# Patient Record
Sex: MALE | Race: WHITE | Employment: OTHER | URBAN - METROPOLITAN AREA
[De-identification: names, ages, dates, MRNs, and addresses within clinical notes are randomized per-mention and may not be internally consistent; named-entity substitution may affect disease eponyms.]

---

## 2017-03-31 DIAGNOSIS — I25.10 ATHEROSCLEROSIS OF CORONARY ARTERY OF NATIVE HEART WITHOUT ANGINA PECTORIS, UNSPECIFIED VESSEL OR LESION TYPE: ICD-10-CM

## 2017-03-31 DIAGNOSIS — E78.2 MIXED HYPERLIPIDEMIA: ICD-10-CM

## 2017-03-31 RX ORDER — PRAVASTATIN SODIUM 40 MG/1
40 TABLET ORAL
Qty: 30 TAB | Refills: 2 | Status: SHIPPED | OUTPATIENT
Start: 2017-03-31 | End: 2017-05-26 | Stop reason: SDUPTHER

## 2017-05-26 ENCOUNTER — OFFICE VISIT (OUTPATIENT)
Dept: CARDIOLOGY CLINIC | Age: 72
End: 2017-05-26

## 2017-05-26 VITALS
SYSTOLIC BLOOD PRESSURE: 102 MMHG | WEIGHT: 187 LBS | HEART RATE: 70 BPM | OXYGEN SATURATION: 98 % | BODY MASS INDEX: 25.33 KG/M2 | RESPIRATION RATE: 16 BRPM | DIASTOLIC BLOOD PRESSURE: 70 MMHG | HEIGHT: 72 IN

## 2017-05-26 DIAGNOSIS — Z00.00 ROUTINE CHECK-UP: ICD-10-CM

## 2017-05-26 DIAGNOSIS — G47.9 FATIGUE DUE TO SLEEP PATTERN DISTURBANCE: ICD-10-CM

## 2017-05-26 DIAGNOSIS — R53.83 FATIGUE DUE TO SLEEP PATTERN DISTURBANCE: ICD-10-CM

## 2017-05-26 DIAGNOSIS — I25.10 ATHEROSCLEROSIS OF CORONARY ARTERY OF NATIVE HEART WITHOUT ANGINA PECTORIS, UNSPECIFIED VESSEL OR LESION TYPE: Primary | ICD-10-CM

## 2017-05-26 DIAGNOSIS — E78.2 MIXED HYPERLIPIDEMIA: ICD-10-CM

## 2017-05-26 RX ORDER — PRAVASTATIN SODIUM 40 MG/1
40 TABLET ORAL
Qty: 90 TAB | Refills: 2 | Status: SHIPPED | OUTPATIENT
Start: 2017-05-26 | End: 2018-02-17 | Stop reason: SDUPTHER

## 2017-05-26 NOTE — MR AVS SNAPSHOT
Visit Information Date & Time Provider Department Dept. Phone Encounter #  
 5/26/2017  8:40 AM Pamela Melvin MD [facility] [phone] [id number] Follow-up Instructions Return in about 1 year (around 5/26/2018). Your Appointments 5/25/2018  8:40 AM  
ESTABLISHED PATIENT with Pamela Melvin MD  
[facility] ([address]) Appt Note: one year follow up  
 [address] [place] [address]  
[address] [facility] [address] Upcoming Health Maintenance Date Due DTaP/Tdap/Td series (1 - Tdap) 8/16/1965 FOBT Q 1 YEAR AGE 50-75 8/16/1994 ZOSTER VACCINE AGE 60> 8/16/2004 GLAUCOMA SCREENING Q2Y 8/16/2009 Pneumococcal 65+ Low/Medium Risk (1 of 2 - PCV13) 8/16/2009 MEDICARE YEARLY EXAM 8/16/2009 INFLUENZA AGE 9 TO ADULT 8/1/2017 Allergies as of 5/26/2017  Review Complete On: 5/26/2017 By: Pamela Melvin MD  
  
 Severity Noted Reaction Type Reactions Ciprofloxacin  11/19/2010   Side Effect Rash, Other (comments) fever Dilaudid [Hydromorphone]  11/19/2010   Intolerance Unknown (comments) BP problems Sulfa (Sulfonamide Antibiotics)  11/19/2010   Side Effect Rash, Other (comments) Fever Current Immunizations  Never Reviewed No immunizations on file. Not reviewed this visit You Were Diagnosed With   
  
 Codes Comments Atherosclerosis of coronary artery of native heart without angina pectoris, unspecified vessel or lesion type    -  Primary ICD-10-CM: I25.10 ICD-9-CM: 414.01 Mixed hyperlipidemia     ICD-10-CM: E78.2 ICD-9-CM: 272.2 Routine check-up     ICD-10-CM: Z00.00 ICD-9-CM: V70.0 Vitals BP Pulse Resp Height(growth percentile) Weight(growth percentile) SpO2  
 102/70 (BP 1 Location: Left arm, BP Patient Position: Sitting) 70 16 6' (1.829 m) 187 lb (84.8 kg) 98% BMI Smoking Status 25.36 kg/m2 Former Smoker Vitals History BMI and BSA Data Body Mass Index Body Surface Area  
 25.36 kg/m 2 2.08 m 2 Preferred Pharmacy Pharmacy Name Phone [facility]/PHARMACY #[id number] [facility] [phone] Your Updated Medication List  
  
   
This list is accurate as of: 5/26/17  9:20 AM.  Always use your most recent med list.  
  
  
  
  
 aspirin 81 mg tablet Take 81 mg by mouth daily. FLOMAX 0.4 mg capsule Generic drug:  tamsulosin Take 0.4 mg by mouth daily. metoprolol succinate 25 mg XL tablet Commonly known as:  TOPROL-XL Take 0.5 Tabs by mouth daily. pravastatin 40 mg tablet Commonly known as:  PRAVACHOL Take 1 Tab by mouth nightly. VOLTAREN 75 mg EC tablet Generic drug:  diclofenac EC Take 75 mg by mouth daily. Prescriptions Sent to Pharmacy Refills  
 pravastatin (PRAVACHOL) 40 mg tablet 2 Sig: Take 1 Tab by mouth nightly. Class: Normal  
 Pharmacy: [facility]/pharmacy #[address] [place] [address] #: [phone] Route: Oral  
  
We Performed the Following AMB POC EKG ROUTINE W/ 12 LEADS, INTER & REP [08393 CPT(R)] LIPID PANEL [64516 CPT(R)] METABOLIC PANEL, COMPREHENSIVE [77327 CPT(R)] TSH REFLEX TO T4 [[id number] Custom] Follow-up Instructions Return in about 1 year (around 5/26/2018). Introducing [facility] & HEALTH SERVICES! [facility] introduces [employer] patient portal. Now you can access parts of your medical record, email your doctor's office, and request medication refills online. 1. In your internet browser, go to [email or web address] 2. Click on the First Time User? Click Here link in the Sign In box. You will see the New Member Sign Up page. 3. Enter your [employer] Access Code exactly as it appears below.  You will not need to use this code after youve completed the sign-up process. If you do not sign up before the expiration date, you must request a new code. · [employer] Access Code: [email or web address] Expires: 8/24/2017  8:59 AM 
 
4. Enter the last four digits of your Social Security Number (xxxx) and Date of Birth (mm/dd/yyyy) as indicated and click Submit. You will be taken to the next sign-up page. 5. Create a [employer] ID. This will be your [employer] login ID and cannot be changed, so think of one that is secure and easy to remember. 6. Create a [employer] password. You can change your password at any time. 7. Enter your Password Reset Question and Answer. This can be used at a later time if you forget your password. 8. Enter your e-mail address. You will receive e-mail notification when new information is available in [address] 9. Click Sign Up. You can now view and download portions of your medical record. 10. Click the Download Summary menu link to download a portable copy of your medical information. If you have questions, please visit the Frequently Asked Questions section of the [employer] website. Remember, [employer] is NOT to be used for urgent needs. For medical emergencies, dial 911. Now available from your iPhone and Android! Please provide this summary of care documentation to your next provider. Your primary care clinician is listed as Bhupinder Leal. If you have any questions after today's visit, please call [phone].

## 2017-05-26 NOTE — PROGRESS NOTES
HISTORY OF PRESENT ILLNESS  Reshma Butterfield is a 66 y.o. male     SUMMARY:   Problem List  Date Reviewed: 5/26/2017          Codes Class Noted    Mixed hyperlipidemia ICD-10-CM: E78.2  ICD-9-CM: 272.2  Unknown        Coronary atherosclerosis ICD-10-CM: I25.10  ICD-9-CM: 414.00  11/19/2010    Overview Addendum 2/17/2011  3:26 PM by Johanny Leonard MD     Mr. Sanju Guido presented in January 2007 with progressive angina. He underwent coronary arteriography, which showed significant multivessel coronary disease with overall preserved LV function. He then underwent bypass surgery with an internal mammary to the LAD, a vein graft to the circumflex marginal and a vein graft to the posterior descending. Cath 2/11 showed sig 3v disease, patent svg to lcx and lima to lad, occ svg to rca with left to right collaterals. Probable culprit diffusely diseased diagonal, less than 2mm. lvef 50%                   Current Outpatient Prescriptions on File Prior to Visit   Medication Sig    tamsulosin (FLOMAX) 0.4 mg capsule Take 0.4 mg by mouth daily.  aspirin 81 mg tablet Take 81 mg by mouth daily.  diclofenac EC (VOLTAREN) 75 mg EC tablet Take 75 mg by mouth daily.  metoprolol succinate (TOPROL-XL) 25 mg XL tablet Take 0.5 Tabs by mouth daily. No current facility-administered medications on file prior to visit. CARDIOLOGY STUDIES TO DATE:  2/9/11. Adenosine cardiolyte. Cardiac stress testing was pos for chest pain and myocardial ischemia. Myocardial perfusion imaging showed reversible deficits. of the ant lat wall Gated EF (%): 52      Chief Complaint   Patient presents with    Coronary Artery Disease     HPI :  Mr. Sanju Guido is doing well. He is complaining of some generalized fatigue especially late in the day, but it turns out he does not sleep real well, and he gets up at five o'clock in the morning and plays tennis and still works full-time. We talked about the possibility of sleep apnea.  He has not had any screening labs for quite a while. He is tolerating the pravastatin but has not had follow-up lipids since he started it. CARDIAC ROS:   negative for chest pain, dyspnea, palpitations, syncope, orthopnea, paroxysmal nocturnal dyspnea, exertional chest pressure/discomfort, claudication, lower extremity edema    No family history on file. Past Medical History:   Diagnosis Date    Coronary Atherosclerosis Coronary Unspecified 11/19/2010    Mixed hyperlipidemia        GENERAL ROS:  A comprehensive review of systems was negative except for that written in the HPI.     Visit Vitals    /70 (BP 1 Location: Left arm, BP Patient Position: Sitting)    Pulse 70    Resp 16    Ht 6' (1.829 m)    Wt 187 lb (84.8 kg)    SpO2 98%    BMI 25.36 kg/m2       Wt Readings from Last 3 Encounters:   05/26/17 187 lb (84.8 kg)   05/27/16 168 lb (76.2 kg)   04/27/15 195 lb 12.8 oz (88.8 kg)            BP Readings from Last 3 Encounters:   05/26/17 102/70   05/27/16 130/76   04/27/15 112/64       PHYSICAL EXAM  General appearance: alert, cooperative, no distress, appears stated age  Neck: supple, symmetrical, trachea midline, no adenopathy, no carotid bruit and no JVD  Lungs: clear to auscultation bilaterally  Heart: regular rate and rhythm, S1, S2 normal, no murmur, click, rub or gallop  Extremities: extremities normal, atraumatic, no cyanosis or edema    Lab Results   Component Value Date/Time    Cholesterol, total 194 07/29/2016 07:33 AM    Cholesterol, total 209 06/03/2016 08:01 AM    Cholesterol, total 170 05/02/2015 07:42 AM    Cholesterol, total 150 04/07/2014 07:50 AM    Cholesterol, total 172 06/27/2013 07:50 AM    HDL Cholesterol 84 07/29/2016 07:33 AM    HDL Cholesterol 70 06/03/2016 08:01 AM    HDL Cholesterol 69 05/02/2015 07:42 AM    HDL Cholesterol 65 04/07/2014 07:50 AM    HDL Cholesterol 73 06/27/2013 07:50 AM    LDL, calculated 97 07/29/2016 07:33 AM    LDL, calculated 126 06/03/2016 08:01 AM    LDL, calculated 87 05/02/2015 07:42 AM    LDL, calculated 73 04/07/2014 07:50 AM    LDL, calculated 87 06/27/2013 07:50 AM    Triglyceride 67 07/29/2016 07:33 AM    Triglyceride 63 06/03/2016 08:01 AM    Triglyceride 71 05/02/2015 07:42 AM    Triglyceride 59 04/07/2014 07:50 AM    Triglyceride 58 06/27/2013 07:50 AM     ASSESSMENT  Mr. Keshawn Perea is stable and asymptomatic I think from a cardiac standpoint. We are going to send him for some screening labs including a CBC and thyroid functions and also check his lipids. He will let us know if he wants to proceed with a sleep evaluation. current treatment plan is effective, no change in therapy  lab results and schedule of future lab studies reviewed with patient  reviewed diet, exercise and weight control    Encounter Diagnoses   Name Primary?  Atherosclerosis of coronary artery of native heart without angina pectoris, unspecified vessel or lesion type Yes    Mixed hyperlipidemia     Routine check-up     Fatigue due to sleep pattern disturbance      Orders Placed This Encounter    LIPID PANEL    METABOLIC PANEL, COMPREHENSIVE    TSH REFLEX TO T4    AMB POC EKG ROUTINE W/ 12 LEADS, INTER & REP    pravastatin (PRAVACHOL) 40 mg tablet       Follow-up Disposition:  Return in about 1 year (around 5/26/2018).     Ben Sanchez MD  5/26/2017

## 2017-06-19 ENCOUNTER — HOSPITAL ENCOUNTER (OUTPATIENT)
Dept: LAB | Age: 72
Discharge: HOME | End: 2017-06-19
Payer: MEDICARE

## 2017-06-19 PROCEDURE — 36415 COLL VENOUS BLD VENIPUNCTURE: CPT

## 2017-06-19 PROCEDURE — 80053 COMPREHEN METABOLIC PANEL: CPT

## 2017-06-19 PROCEDURE — 80061 LIPID PANEL: CPT

## 2017-06-19 PROCEDURE — 84443 ASSAY THYROID STIM HORMONE: CPT

## 2017-06-20 LAB
ALBUMIN SERPL-MCNC: 4.1 G/DL (ref 3.5–4.8)
ALBUMIN/GLOB SERPL: 2 {RATIO} (ref 1.2–2.2)
ALP SERPL-CCNC: 86 IU/L (ref 39–117)
ALT SERPL-CCNC: 14 IU/L (ref 0–44)
AST SERPL-CCNC: 12 IU/L (ref 0–40)
BILIRUB SERPL-MCNC: 0.5 MG/DL (ref 0–1.2)
BUN SERPL-MCNC: 20 MG/DL (ref 8–27)
BUN/CREAT SERPL: 19 (ref 10–24)
CALCIUM SERPL-MCNC: 8.8 MG/DL (ref 8.6–10.2)
CHLORIDE SERPL-SCNC: 103 MMOL/L (ref 96–106)
CHOLEST SERPL-MCNC: 160 MG/DL (ref 100–199)
CO2 SERPL-SCNC: 23 MMOL/L (ref 18–29)
CREAT SERPL-MCNC: 1.06 MG/DL (ref 0.76–1.27)
GLOBULIN SER CALC-MCNC: 2.1 G/DL (ref 1.5–4.5)
GLUCOSE SERPL-MCNC: 106 MG/DL (ref 65–99)
HDLC SERPL-MCNC: 78 MG/DL
INTERPRETATION, 910389: NORMAL
LDLC SERPL CALC-MCNC: 72 MG/DL (ref 0–99)
POTASSIUM SERPL-SCNC: 4.4 MMOL/L (ref 3.5–5.2)
PROT SERPL-MCNC: 6.2 G/DL (ref 6–8.5)
SODIUM SERPL-SCNC: 141 MMOL/L (ref 134–144)
TRIGL SERPL-MCNC: 48 MG/DL (ref 0–149)
TSH SERPL DL<=0.005 MIU/L-ACNC: 2.19 UIU/ML (ref 0.45–4.5)
VLDLC SERPL CALC-MCNC: 10 MG/DL (ref 5–40)

## 2018-05-07 ENCOUNTER — TELEPHONE (OUTPATIENT)
Dept: CARDIOLOGY CLINIC | Age: 73
End: 2018-05-07

## 2018-05-07 NOTE — TELEPHONE ENCOUNTER
Dr. Samm Muir [facility]) is requesting cardiac clearance for patient to have a prostate biopsy with ultrasound under local anesthesia. The procedure is scheduled for 5/21/2018. Cardiac clearance is to include holding aspirin. If okay, how many days may he hold it? Please advise. Thanks.      Fax # [phone] Chase Huynh  Phone # [phone]       Future Appointments  Date Time Provider Jen Perez   5/25/2018 8:40 AM Shan Riley  [address]

## 2018-05-07 NOTE — TELEPHONE ENCOUNTER
[id number] for surgery without special precautions or further cardiac testing.  Hold asa for 7 days

## 2018-06-13 ENCOUNTER — OFFICE VISIT (OUTPATIENT)
Dept: CARDIOLOGY CLINIC | Age: 73
End: 2018-06-13

## 2018-06-13 VITALS
RESPIRATION RATE: 16 BRPM | DIASTOLIC BLOOD PRESSURE: 70 MMHG | WEIGHT: 178 LBS | HEART RATE: 75 BPM | SYSTOLIC BLOOD PRESSURE: 116 MMHG | HEIGHT: 72 IN | BODY MASS INDEX: 24.11 KG/M2 | OXYGEN SATURATION: 98 %

## 2018-06-13 DIAGNOSIS — I25.10 ATHEROSCLEROSIS OF NATIVE CORONARY ARTERY OF NATIVE HEART WITHOUT ANGINA PECTORIS: ICD-10-CM

## 2018-06-13 DIAGNOSIS — R29.818 SUSPECTED SLEEP APNEA: ICD-10-CM

## 2018-06-13 DIAGNOSIS — E78.2 MIXED HYPERLIPIDEMIA: Primary | ICD-10-CM

## 2018-06-13 DIAGNOSIS — R53.82 CHRONIC FATIGUE: ICD-10-CM

## 2018-06-13 NOTE — PROGRESS NOTES
HISTORY OF PRESENT ILLNESS  Jason Sim is a 67 y.o. male     SUMMARY:   Problem List  Date Reviewed: 6/13/2018          Codes Class Noted    Mixed hyperlipidemia ICD-10-CM: E78.2  ICD-9-CM: 272.2  Unknown        Coronary atherosclerosis ICD-10-CM: I25.10  ICD-9-CM: 414.00  11/19/2010    Overview Addendum 2/17/2011  3:26 PM by Philomena Vallejo MD     Mr. Marzena Pepe presented in January 2007 with progressive angina. He underwent coronary arteriography, which showed significant multivessel coronary disease with overall preserved LV function. He then underwent bypass surgery with an internal mammary to the LAD, a vein graft to the circumflex marginal and a vein graft to the posterior descending. Cath 2/11 showed sig 3v disease, patent svg to lcx and lima to lad, occ svg to rca with left to right collaterals. Probable culprit diffusely diseased diagonal, less than 2mm. lvef 50%                   Current Outpatient Prescriptions on File Prior to Visit   Medication Sig    pravastatin (PRAVACHOL) 40 mg tablet TAKE 1 TABLET BY MOUTH NIGHTLY    tamsulosin (FLOMAX) 0.4 mg capsule Take 0.4 mg by mouth daily.  aspirin 81 mg tablet Take 81 mg by mouth daily.  diclofenac EC (VOLTAREN) 75 mg EC tablet Take 75 mg by mouth daily. No current facility-administered medications on file prior to visit. CARDIOLOGY STUDIES TO DATE:  2/9/11. Adenosine cardiolyte. Cardiac stress testing was pos for chest pain and myocardial ischemia. Myocardial perfusion imaging showed reversible deficits. of the ant lat wall Gated EF (%): 52      Chief Complaint   Patient presents with    Coronary Artery Disease     HPI :  Mr. Marzena Pepe is still complaining of fatigue and it turns out in talking to his wife, he does snore. He wakes up tired and will nap during the day, so I am wondering about the possibility of sleep apnea. He has not had his lipids checked for about a year.   He continues to be very active around the house and exercise without any symptoms suggestive of angina or heart failure. CARDIAC ROS:   negative for chest pain, dyspnea, palpitations, syncope, orthopnea, paroxysmal nocturnal dyspnea, exertional chest pressure/discomfort, claudication, lower extremity edema    No family history on file. Past Medical History:   Diagnosis Date    Coronary Atherosclerosis Coronary Unspecified 11/19/2010    Mixed hyperlipidemia        GENERAL ROS:  A comprehensive review of systems was negative except for that written in the HPI.     Visit Vitals    /70 (BP 1 Location: Left arm, BP Patient Position: Sitting)    Pulse 75    Resp 16    Ht 6' (1.829 m)    Wt 178 lb (80.7 kg)    SpO2 98%    BMI 24.14 kg/m2       Wt Readings from Last 3 Encounters:   06/13/18 178 lb (80.7 kg)   05/26/17 187 lb (84.8 kg)   05/27/16 168 lb (76.2 kg)            BP Readings from Last 3 Encounters:   06/13/18 116/70   05/26/17 102/70   05/27/16 130/76       PHYSICAL EXAM  General appearance: alert, cooperative, no distress, appears stated age  Neck: supple, symmetrical, trachea midline, no adenopathy, no carotid bruit and no JVD  Lungs: clear to auscultation bilaterally  Heart: regular rate and rhythm, S1, S2 normal, no murmur, click, rub or gallop  Extremities: extremities normal, atraumatic, no cyanosis or edema    Lab Results   Component Value Date/Time    Cholesterol, total 160 06/19/2017 07:29 AM    Cholesterol, total 194 07/29/2016 07:33 AM    Cholesterol, total 209 (H) 06/03/2016 08:01 AM    Cholesterol, total 170 05/02/2015 07:42 AM    Cholesterol, total 150 04/07/2014 07:50 AM    HDL Cholesterol 78 06/19/2017 07:29 AM    HDL Cholesterol 84 07/29/2016 07:33 AM    HDL Cholesterol 70 06/03/2016 08:01 AM    HDL Cholesterol 69 05/02/2015 07:42 AM    HDL Cholesterol 65 04/07/2014 07:50 AM    LDL, calculated 72 06/19/2017 07:29 AM    LDL, calculated 97 07/29/2016 07:33 AM    LDL, calculated 126 (H) 06/03/2016 08:01 AM    LDL, calculated 87 05/02/2015 07:42 AM    LDL, calculated 73 04/07/2014 07:50 AM    Triglyceride 48 06/19/2017 07:29 AM    Triglyceride 67 07/29/2016 07:33 AM    Triglyceride 63 06/03/2016 08:01 AM    Triglyceride 71 05/02/2015 07:42 AM    Triglyceride 59 04/07/2014 07:50 AM     ASSESSMENT  Mr. Bowen Ramírez is stable and I think asymptomatic. We are going to send him for a fasting lipid profile and a sleep evaluation. current treatment plan is effective, no change in therapy  lab results and schedule of future lab studies reviewed with patient  reviewed diet, exercise and weight control    Encounter Diagnoses   Name Primary?  Mixed hyperlipidemia Yes    Atherosclerosis of native coronary artery of native heart without angina pectoris      No orders of the defined types were placed in this encounter. Follow-up Disposition:  Return in about 1 year (around 6/13/2019).     Alfredo Saldana MD  6/13/2018

## 2018-06-13 NOTE — MR AVS SNAPSHOT
[address] [facility] [address] [facility] 57 
[phone] Patient: Austin Coker MRN: [id number] EJ:[id number] Visit Information Date & Time Provider Department Dept. Phone Encounter #  
 6/13/2018  3:20 PM Claus Peraza MD [facility] Kevin Fuentes [phone] [id number] Follow-up Instructions Return in about 1 year (around 6/13/2019). Upcoming Health Maintenance Date Due DTaP/Tdap/Td series (1 - Tdap) 8/16/1965 FOBT Q 1 YEAR AGE 50-75 8/16/1994 ZOSTER VACCINE AGE 60> 6/16/2004 GLAUCOMA SCREENING Q2Y 8/16/2009 Pneumococcal 65+ Low/Medium Risk (1 of 2 - PCV13) 8/16/2009 MEDICARE YEARLY EXAM 3/14/2018 Influenza Age 5 to Adult 8/1/2018 Allergies as of 6/13/2018  Review Complete On: 6/13/2018 By: Claus Peraza MD  
  
 Severity Noted Reaction Type Reactions Ciprofloxacin  11/19/2010   Side Effect Rash, Other (comments) fever Dilaudid [Hydromorphone]  11/19/2010   Intolerance Unknown (comments) BP problems Sulfa (Sulfonamide Antibiotics)  11/19/2010   Side Effect Rash, Other (comments) Fever Current Immunizations  Never Reviewed No immunizations on file. Not reviewed this visit You Were Diagnosed With   
  
 Codes Comments Mixed hyperlipidemia    -  Primary ICD-10-CM: U06.1 ICD-9-CM: 272.2 Atherosclerosis of native coronary artery of native heart without angina pectoris     ICD-10-CM: I25.10 ICD-9-CM: 414.01 Vitals BP Pulse Resp Height(growth percentile) Weight(growth percentile) SpO2  
 116/70 (BP 1 Location: Left arm, BP Patient Position: Sitting) 75 16 6' (1.829 m) 178 lb (80.7 kg) 98% BMI Smoking Status 24.14 kg/m2 Former Smoker BMI and BSA Data Body Mass Index Body Surface Area  
 24.14 kg/m 2 2.02 m 2 Preferred Pharmacy Pharmacy Name Phone [facility]/PHARMACY #[id number] [phone] [facility] [phone] Your Updated Medication List  
  
   
This list is accurate as of 6/13/18  3:26 PM.  Always use your most recent med list.  
  
  
  
  
 aspirin 81 mg tablet Take 81 mg by mouth daily. FLOMAX 0.4 mg capsule Generic drug:  tamsulosin Take 0.4 mg by mouth daily. pravastatin 40 mg tablet Commonly known as:  PRAVACHOL  
TAKE 1 TABLET BY MOUTH NIGHTLY  
  
 VOLTAREN 75 mg EC tablet Generic drug:  diclofenac EC Take 75 mg by mouth daily. Follow-up Instructions Return in about 1 year (around 6/13/2019). Introducing hospitals & HEALTH SERVICES! [facility] introduces [employer] patient portal. Now you can access parts of your medical record, email your doctor's office, and request medication refills online. 1. In your internet browser, go to [email or web address] 2. Click on the First Time User? Click Here link in the Sign In box. You will see the New Member Sign Up page. 3. Enter your [employer] Access Code exactly as it appears below. You will not need to use this code after youve completed the sign-up process. If you do not sign up before the expiration date, you must request a new code. · [employer] Access Code: [email or web address] Expires: 9/11/2018  3:26 PM 
 
4. Enter the last four digits of your Social Security Number (xxxx) and Date of Birth (mm/dd/yyyy) as indicated and click Submit. You will be taken to the next sign-up page. 5. Create a AAMPPt ID. This will be your [employer] login ID and cannot be changed, so think of one that is secure and easy to remember. 6. Create a [employer] password. You can change your password at any time. 7. Enter your Password Reset Question and Answer. This can be used at a later time if you forget your password. 8. Enter your e-mail address. You will receive e-mail notification when new information is available in [address] 9. Click Sign Up. You can now view and download portions of your medical record. 10. Click the Download Summary menu link to download a portable copy of your medical information. If you have questions, please visit the Frequently Asked Questions section of the [employer] website. Remember, [employer] is NOT to be used for urgent needs. For medical emergencies, dial 911. Now available from your iPhone and Android! Please provide this summary of care documentation to your next provider. Your primary care clinician is listed as Anna Van. If you have any questions after today's visit, please call [phone].

## 2018-06-14 ENCOUNTER — HOSPITAL ENCOUNTER (OUTPATIENT)
Dept: LAB | Age: 73
Discharge: HOME | End: 2018-06-14
Payer: MEDICARE

## 2018-06-14 PROCEDURE — 36415 COLL VENOUS BLD VENIPUNCTURE: CPT

## 2018-06-14 PROCEDURE — 80053 COMPREHEN METABOLIC PANEL: CPT

## 2018-06-14 PROCEDURE — 80061 LIPID PANEL: CPT

## 2018-06-15 ENCOUNTER — TELEPHONE (OUTPATIENT)
Dept: CARDIOLOGY CLINIC | Age: 73
End: 2018-06-15

## 2018-06-15 DIAGNOSIS — E78.2 MIXED HYPERLIPIDEMIA: Primary | ICD-10-CM

## 2018-06-15 DIAGNOSIS — I25.10 ATHEROSCLEROSIS OF NATIVE CORONARY ARTERY OF NATIVE HEART WITHOUT ANGINA PECTORIS: ICD-10-CM

## 2018-06-15 LAB
ALBUMIN SERPL-MCNC: 3.9 G/DL (ref 3.5–4.8)
ALBUMIN/GLOB SERPL: 1.7 {RATIO} (ref 1.2–2.2)
ALP SERPL-CCNC: 79 IU/L (ref 39–117)
ALT SERPL-CCNC: 21 IU/L (ref 0–44)
AST SERPL-CCNC: 17 IU/L (ref 0–40)
BILIRUB SERPL-MCNC: 0.8 MG/DL (ref 0–1.2)
BUN SERPL-MCNC: 18 MG/DL (ref 8–27)
BUN/CREAT SERPL: 17 (ref 10–24)
CALCIUM SERPL-MCNC: 9.1 MG/DL (ref 8.6–10.2)
CHLORIDE SERPL-SCNC: 104 MMOL/L (ref 96–106)
CHOLEST SERPL-MCNC: 203 MG/DL (ref 100–199)
CO2 SERPL-SCNC: 25 MMOL/L (ref 20–29)
CREAT SERPL-MCNC: 1.05 MG/DL (ref 0.76–1.27)
GFR SERPLBLD CREATININE-BSD FMLA CKD-EPI: 70 ML/MIN/1.73
GFR SERPLBLD CREATININE-BSD FMLA CKD-EPI: 81 ML/MIN/1.73
GLOBULIN SER CALC-MCNC: 2.3 G/DL (ref 1.5–4.5)
GLUCOSE SERPL-MCNC: 93 MG/DL (ref 65–99)
HDLC SERPL-MCNC: 67 MG/DL
INTERPRETATION, 910389: NORMAL
LDLC SERPL CALC-MCNC: 122 MG/DL (ref 0–99)
POTASSIUM SERPL-SCNC: 4.5 MMOL/L (ref 3.5–5.2)
PROT SERPL-MCNC: 6.2 G/DL (ref 6–8.5)
SODIUM SERPL-SCNC: 142 MMOL/L (ref 134–144)
TRIGL SERPL-MCNC: 69 MG/DL (ref 0–149)
VLDLC SERPL CALC-MCNC: 14 MG/DL (ref 5–40)

## 2018-06-15 NOTE — TELEPHONE ENCOUNTER
----- Message from Too Raygoza MD sent at 6/15/2018  8:16 AM EDT -----  Bad chol too high, double pravastatin and repeat labs 6wks

## 2018-06-15 NOTE — TELEPHONE ENCOUNTER
Spoke with patient Two patient indentifiers verified. Informed patient of his lab results and Dr. Ashley Villarreal recommendations. Mailed lab slip to patient for repeat labs in 6 weeks. Pt verbalized understanding and denies any questions at this time.

## 2018-06-15 NOTE — TELEPHONE ENCOUNTER
----- Message from Darren Talavera MD sent at 6/15/2018  8:16 AM EDT -----  Bad chol too high, double pravastatin and repeat labs 6wks

## 2018-07-19 ENCOUNTER — HOSPITAL ENCOUNTER (OUTPATIENT)
Dept: INTERVENTIONAL RADIOLOGY/VASCULAR | Age: 73
Discharge: HOME | End: 2018-07-19
Attending: ORTHOPAEDIC SURGERY | Admitting: STUDENT IN AN ORGANIZED HEALTH CARE EDUCATION/TRAINING PROGRAM
Payer: MEDICARE

## 2018-07-19 VITALS
BODY MASS INDEX: 25.05 KG/M2 | RESPIRATION RATE: 18 BRPM | HEIGHT: 70 IN | HEART RATE: 64 BPM | DIASTOLIC BLOOD PRESSURE: 70 MMHG | SYSTOLIC BLOOD PRESSURE: 129 MMHG | TEMPERATURE: 97.8 F | WEIGHT: 175 LBS | OXYGEN SATURATION: 97 %

## 2018-07-19 DIAGNOSIS — M51.36 DEGENERATION OF LUMBAR INTERVERTEBRAL DISC: ICD-10-CM

## 2018-07-19 PROCEDURE — 64483 NJX AA&/STRD TFRM EPI L/S 1: CPT

## 2018-07-19 PROCEDURE — 74011250636 HC RX REV CODE- 250/636: Performed by: STUDENT IN AN ORGANIZED HEALTH CARE EDUCATION/TRAINING PROGRAM

## 2018-07-19 PROCEDURE — 74011636320 HC RX REV CODE- 636/320: Performed by: STUDENT IN AN ORGANIZED HEALTH CARE EDUCATION/TRAINING PROGRAM

## 2018-07-19 PROCEDURE — 77030003666 HC NDL SPINAL BD -A

## 2018-07-19 PROCEDURE — 74011000250 HC RX REV CODE- 250: Performed by: STUDENT IN AN ORGANIZED HEALTH CARE EDUCATION/TRAINING PROGRAM

## 2018-07-19 PROCEDURE — 64484 NJX AA&/STRD TFRM EPI L/S EA: CPT

## 2018-07-19 RX ORDER — SODIUM CHLORIDE 9 MG/ML
10 INJECTION INTRAMUSCULAR; INTRAVENOUS; SUBCUTANEOUS
Status: DISCONTINUED | OUTPATIENT
Start: 2018-07-19 | End: 2018-07-19 | Stop reason: HOSPADM

## 2018-07-19 RX ORDER — DEXAMETHASONE SODIUM PHOSPHATE 4 MG/ML
8 INJECTION, SOLUTION INTRA-ARTICULAR; INTRALESIONAL; INTRAMUSCULAR; INTRAVENOUS; SOFT TISSUE ONCE
Status: COMPLETED | OUTPATIENT
Start: 2018-07-19 | End: 2018-07-19

## 2018-07-19 RX ORDER — LIDOCAINE HYDROCHLORIDE 10 MG/ML
INJECTION, SOLUTION EPIDURAL; INFILTRATION; INTRACAUDAL; PERINEURAL
Status: DISCONTINUED
Start: 2018-07-19 | End: 2018-07-19 | Stop reason: HOSPADM

## 2018-07-19 RX ORDER — SODIUM BICARBONATE 42 MG/ML
5 INJECTION, SOLUTION INTRAVENOUS
Status: DISCONTINUED | OUTPATIENT
Start: 2018-07-19 | End: 2018-07-19 | Stop reason: HOSPADM

## 2018-07-19 RX ORDER — LIDOCAINE HYDROCHLORIDE 10 MG/ML
10 INJECTION, SOLUTION EPIDURAL; INFILTRATION; INTRACAUDAL; PERINEURAL
Status: COMPLETED | OUTPATIENT
Start: 2018-07-19 | End: 2018-07-19

## 2018-07-19 RX ADMIN — LIDOCAINE HYDROCHLORIDE 10 ML: 10 INJECTION, SOLUTION EPIDURAL; INFILTRATION; INTRACAUDAL; PERINEURAL at 08:34

## 2018-07-19 RX ADMIN — IOHEXOL 20 ML: 180 INJECTION INTRAVENOUS at 08:34

## 2018-07-19 RX ADMIN — DEXAMETHASONE SODIUM PHOSPHATE 8 MG: 4 INJECTION, SOLUTION INTRAMUSCULAR; INTRAVENOUS at 08:35

## 2018-07-19 NOTE — ROUTINE PROCESS
Pt. Discharged to home and transported to d/c lot via w/c. Assisted for an extended time period for right leg numbness post procedure numbness. Able to stand and step without difficulty. Verbalized understanding of d/c instructions.

## 2018-07-19 NOTE — DISCHARGE INSTRUCTIONS
Steroid Injection Discharge Instructions    General Information:   A steroid injection was performed today, placing a combination of a steroid and an anesthetic (numbing medicine) into the space around the nerves of your spine. This is done to treat back pain. It may take 7-10 days for the injection to reach its full potential.  This procedure can be done at any level of the spinal column, depending on where your pain is. Your doctor will have ordered the appropriate level to be treated prior to your coming in for the procedure. Home Care Instructions: You can resume your regular diet. Do not drink alcohol today. You may notice that you have to use your pain medications less after your injection. Some people do not notice much of a change in their pain after the first injection. If that is the case, it is worth your time to have a second one done. This is why these injections are sometimes ordered in a series of three. Keep the puncture site clean and dry for 24 hours, and then you may remove the dressing. Showering is acceptable after the bandage is removed. Rest today be aware there maybe numbness or tingling that may last up to 12 hours after the inject. Call If:   You should call your Physician and/or the Radiology Nurse if you have any bleeding other than a small spot on your bandage. Call if you have any signs of infection, fever, increased pain at the puncture site, confusion, or a headache that worsens when you stand and eases when lying flat. Follow-Up Instructions:  Please see your ordering doctor as he/she has requested. Let your doctor know if you have relief from your pain so they may schedule another injection for you if it is indicated. To Reach Us:  Should you experience any significant changes, please call [phone] between the hours of 7:30 am and 10 pm or [phone] after hours.  After hours, ask the  to page the X-ray Technologist, and describe the problem to the technologist.

## 2018-07-19 NOTE — IP AVS SNAPSHOT
[facility] [address] [place] [address] 
[phone] Patient: John Bryant MRN: [id number] [id number] About your hospitalization You were admitted on:  July 19, 2018 You last received care in the:  [facility] RAD ANGIO IR You were discharged on:  July 19, 2018 Why you were hospitalized Your primary diagnosis was:  Not on File Follow-up Information None Your Scheduled Appointments Thursday July 19, 2018  8:30 AM EDT  
IR INJ FORA EP LUM ANE/GIOVANNA with [facility] ANGIO 2  
[facility] RAD ANGIO IR ([address]) [address]  
[phone] GENERAL INSTRUCTIONS Patient should report to the Admitting Office just inside the main entrance to the Hospital 30 minutes prior to the appointment time unless instructed otherwise. (NOT FOR MRI). Discharge Orders None A check lian indicates which time of day the medication should be taken. My Medications ASK your doctor about these medications Instructions Each Dose to Equal  
 Morning Noon Evening Bedtime  
 aspirin 81 mg tablet Your last dose was: Your next dose is: Take 81 mg by mouth daily. 81 mg  
    
   
   
   
  
 FLOMAX 0.4 mg capsule Generic drug:  tamsulosin Your last dose was: Your next dose is: Take 0.4 mg by mouth daily. 0.4 mg  
    
   
   
   
  
 pravastatin 40 mg tablet Commonly known as:  PRAVACHOL Your last dose was: Your next dose is: TAKE 1 TABLET BY MOUTH NIGHTLY  
     
   
   
   
  
 VOLTAREN 75 mg EC tablet Generic drug:  diclofenac EC Your last dose was: Your next dose is: Take 75 mg by mouth daily. 75 mg Discharge Instructions Steroid Injection Discharge Instructions General Information: A steroid injection was performed today, placing a combination of a steroid and an anesthetic (numbing medicine) into the space around the nerves of your spine. This is done to treat back pain. It may take 7-10 days for the injection to reach its full potential.  This procedure can be done at any level of the spinal column, depending on where your pain is. Your doctor will have ordered the appropriate level to be treated prior to your coming in for the procedure. Home Care Instructions: You can resume your regular diet. Do not drink alcohol today. You may notice that you have to use your pain medications less after your injection. Some people do not notice much of a change in their pain after the first injection. If that is the case, it is worth your time to have a second one done. This is why these injections are sometimes ordered in a series of three. Keep the puncture site clean and dry for 24 hours, and then you may remove the dressing. Showering is acceptable after the bandage is removed. Rest today be aware there maybe numbness or tingling that may last up to 12 hours after the inject. Call If: 
 You should call your Physician and/or the Radiology Nurse if you have any bleeding other than a small spot on your bandage. Call if you have any signs of infection, fever, increased pain at the puncture site, confusion, or a headache that worsens when you stand and eases when lying flat. Follow-Up Instructions:  Please see your ordering doctor as he/she has requested. Let your doctor know if you have relief from your pain so they may schedule another injection for you if it is indicated. To Reach Us:  Should you experience any significant changes, please call [phone] between the hours of 7:30 am and 10 pm or [phone] after hours. After hours, ask the  to page the [facility] Technologist, and describe the problem to the technologist. 
 
  
  
  
Introducing [facility] SERVICES! [facility] introduces [employer] patient portal. Now you can access parts of your medical record, email your doctor's office, and request medication refills online. 1. In your internet browser, go to [email or web address] 2. Click on the First Time User? Click Here link in the Sign In box. You will see the New Member Sign Up page. 3. Enter your [employer] Access Code exactly as it appears below. You will not need to use this code after youve completed the sign-up process. If you do not sign up before the expiration date, you must request a new code. · [employer] Access Code: [email or web address] Expires: 9/11/2018  3:26 PM 
 
4. Enter the last four digits of your Social Security Number (xxxx) and Date of Birth (mm/dd/yyyy) as indicated and click Submit. You will be taken to the next sign-up page. 5. Create a [employer] ID. This will be your [employer] login ID and cannot be changed, so think of one that is secure and easy to remember. 6. Create a [employer] password. You can change your password at any time. 7. Enter your Password Reset Question and Answer. This can be used at a later time if you forget your password. 8. Enter your e-mail address. You will receive e-mail notification when new information is available in [address] 9. Click Sign Up. You can now view and download portions of your medical record. 10. Click the Download Summary menu link to download a portable copy of your medical information. If you have questions, please visit the Frequently Asked Questions section of the [employer] website. Remember, [employer] is NOT to be used for urgent needs. For medical emergencies, dial 911. Now available from your iPhone and Android! Introducing Eliot Rajan As a [facility] patient, I wanted to make you aware of our electronic visit tool called Eliot Rajan. [facility] 24/7 allows you to connect within minutes with a medical provider 24 hours a day, seven days a week via a mobile device or tablet or logging into a secure website from your computer. You can access [employer] from anywhere in the [place]. A virtual visit might be right for you when you have a simple condition and feel like you just dont want to get out of bed, or cant get away from work for an appointment, when your regular [employer] provider is not available (evenings, weekends or holidays), or when youre out of town and need minor care. Electronic visits cost only $49 and if the [employer] 24/7 provider determines a prescription is needed to treat your condition, one can be electronically transmitted to a nearby pharmacy*. Please take a moment to enroll today if you have not already done so. The enrollment process is free and takes just a few minutes. To enroll, please download the [employer] 24/7 gumaro to your tablet or phone, or visit [email or web address] to enroll on your computer. And, as an [address] [place] [address] patient with a [employer] account, the results of your visits will be scanned into your electronic medical record and your primary care provider will be able to view the scanned results. We urge you to continue to see your regular [employer] provider for your ongoing medical care. And while your primary care provider may not be the one available when you seek a Eliot Robihanna virtual visit, the peace of mind you get from getting a real diagnosis real time can be priceless. For more information on [employer], view our Frequently Asked Questions (FAQs) at [email or web address]. Sincerely, 
 
Ronan Kim MD 
Chief Medical Officer [employer] *:  certain medications cannot be prescribed via [employer] Providers Seen During Your Hospitalization Provider Specialty Primary office phone Noreen Blanc MD Orthopedic Surgery [phone] Your Primary Care Physician (PCP) Primary Care Physician Office Phone Office Fax Solitario Nugent, [address] Jigna Pratt [facility] [phone] You are allergic to the following Allergen Reactions Ciprofloxacin Rash Other (comments) fever Dilaudid (Hydromorphone) Unknown (comments) BP problems Sulfa (Sulfonamide Antibiotics) Rash Other (comments) Fever Recent Documentation Height Weight BMI Smoking Status 1.778 m 79.4 kg 25.11 kg/m2 Former Smoker Emergency Contacts Name Discharge Info Relation Home Work Mobile Haroldo Hernadezleen DISCHARGE CAREGIVER [3] Spouse [3] [phone] Patient Belongings The following personal items are in your possession at time of discharge: 
                             
 
  
  
 Please provide this summary of care documentation to your next provider. Signatures-by signing, you are acknowledging that this After Visit Summary has been reviewed with you and you have received a copy. Patient Signature:  ____________________________________________________________ Date:  ____________________________________________________________  
  
Domitila Charter Provider Signature:  ____________________________________________________________ Date:  ____________________________________________________________

## 2018-07-19 NOTE — H&P
Radiology History and Physical    Patient: Kevin Szymanski 67 y.o. male       Chief Complaint: No chief complaint on file. History of Present Illness: Low back pain. Presenting for SPENCER at L3-4 and L4-5. No fevers, chills, SOB. History:    Past Medical History:   Diagnosis Date    Coronary Atherosclerosis Coronary Unspecified 11/19/2010    Mixed hyperlipidemia      No family history on file. Social History     Social History    Marital status:      Spouse name: N/A    Number of children: N/A    Years of education: N/A     Occupational History    Not on file. Social History Main Topics    Smoking status: Former Smoker     Quit date: 1/1/1970    Smokeless tobacco: Never Used    Alcohol use No    Drug use: No    Sexual activity: Yes     Partners: Female     Other Topics Concern    Not on file     Social History Narrative       Allergies: Allergies   Allergen Reactions    Ciprofloxacin Rash and Other (comments)     fever    Dilaudid [Hydromorphone] Unknown (comments)     BP problems    Sulfa (Sulfonamide Antibiotics) Rash and Other (comments)     Fever         Current Medications:  Current Facility-Administered Medications   Medication Dose Route Frequency    sodium chloride 0.9% injection 10 mL  10 mL IntraVENous RAD ONCE    sodium bicarbonate (4.2%) injection 210 mg  5 mL SubCUTAneous RAD ONCE    lidocaine (PF) (XYLOCAINE) 10 mg/mL (1 %) injection         Current Outpatient Prescriptions   Medication Sig    pravastatin (PRAVACHOL) 40 mg tablet TAKE 1 TABLET BY MOUTH NIGHTLY    tamsulosin (FLOMAX) 0.4 mg capsule Take 0.4 mg by mouth daily.  aspirin 81 mg tablet Take 81 mg by mouth daily.  diclofenac EC (VOLTAREN) 75 mg EC tablet Take 75 mg by mouth daily. Physical Exam:  Blood pressure 129/70, pulse 64, temperature 97.8 °F (36.6 °C), resp. rate 18, height 5' 10\" (1.778 m), weight 79.4 kg (175 lb), SpO2 97 %.   GENERAL: alert, cooperative, no distress, appears stated age  LUNG: clear to auscultation bilaterally  HEART: regular rate and rhythm  ABD: Non tender, non distended. Alerts:    Hospital Problems  Date Reviewed: 6/13/2018    None          Laboratory:    No results for input(s): HGB, HCT, WBC, PLT, INR, BUN, CREA, K, CRCLT, HGBEXT, HCTEXT, PLTEXT in the last 72 hours. No lab exists for component: PTT, PT, INREXT      Plan of Care/Planned Procedure:  Risks, benefits, and alternatives reviewed with patient and he agrees to proceed with the procedure.        Aurora Fenton MD

## 2018-08-25 DIAGNOSIS — I25.10 ATHEROSCLEROSIS OF CORONARY ARTERY OF NATIVE HEART WITHOUT ANGINA PECTORIS, UNSPECIFIED VESSEL OR LESION TYPE: ICD-10-CM

## 2018-08-25 DIAGNOSIS — E78.2 MIXED HYPERLIPIDEMIA: ICD-10-CM

## 2018-08-27 RX ORDER — PRAVASTATIN SODIUM 40 MG/1
TABLET ORAL
Qty: 90 TAB | Refills: 1 | Status: SHIPPED | OUTPATIENT
Start: 2018-08-27 | End: 2019-05-13 | Stop reason: ALTCHOICE

## 2018-08-27 RX ORDER — PRAVASTATIN SODIUM 40 MG/1
TABLET ORAL
Qty: 90 TAB | Refills: 1 | Status: SHIPPED | OUTPATIENT
Start: 2018-08-27 | End: 2018-08-27 | Stop reason: SDUPTHER

## 2018-08-27 NOTE — TELEPHONE ENCOUNTER
Requested Prescriptions     Signed Prescriptions Disp Refills    pravastatin (PRAVACHOL) 40 mg tablet 90 Tab 1     Sig: TAKE 1 TABLET BY MOUTH NIGHTLY     Authorizing Provider: Sandro Hall     Ordering User: Andrea Russo    Per Dr. Pritesh Smith verbal order

## 2019-01-31 ENCOUNTER — OFFICE VISIT (OUTPATIENT)
Dept: CARDIOLOGY CLINIC | Age: 74
End: 2019-01-31

## 2019-01-31 VITALS
HEIGHT: 70 IN | WEIGHT: 186.2 LBS | RESPIRATION RATE: 16 BRPM | DIASTOLIC BLOOD PRESSURE: 70 MMHG | BODY MASS INDEX: 26.66 KG/M2 | HEART RATE: 68 BPM | SYSTOLIC BLOOD PRESSURE: 118 MMHG

## 2019-01-31 DIAGNOSIS — I25.10 ATHEROSCLEROSIS OF NATIVE CORONARY ARTERY OF NATIVE HEART WITHOUT ANGINA PECTORIS: Primary | ICD-10-CM

## 2019-01-31 DIAGNOSIS — R42 DIZZINESS: ICD-10-CM

## 2019-01-31 DIAGNOSIS — E78.2 MIXED HYPERLIPIDEMIA: ICD-10-CM

## 2019-01-31 DIAGNOSIS — R06.02 SHORTNESS OF BREATH: ICD-10-CM

## 2019-01-31 NOTE — PROGRESS NOTES
HISTORY OF PRESENT ILLNESS  Lacey Hoffman is a 75 y.o. male     SUMMARY:   Problem List  Date Reviewed: 1/31/2019          Codes Class Noted    Mixed hyperlipidemia ICD-10-CM: E78.2  ICD-9-CM: 272.2  Unknown        Coronary atherosclerosis ICD-10-CM: I25.10  ICD-9-CM: 414.00  11/19/2010    Overview Addendum 2/17/2011  3:26 PM by Silvia Guzman MD     Mr. Mayela Monreal presented in January 2007 with progressive angina. He underwent coronary arteriography, which showed significant multivessel coronary disease with overall preserved LV function. He then underwent bypass surgery with an internal mammary to the LAD, a vein graft to the circumflex marginal and a vein graft to the posterior descending. Cath 2/11 showed sig 3v disease, patent svg to lcx and lima to lad, occ svg to rca with left to right collaterals. Probable culprit diffusely diseased diagonal, less than 2mm. lvef 50%                   Current Outpatient Medications on File Prior to Visit   Medication Sig    pravastatin (PRAVACHOL) 40 mg tablet TAKE 1 TABLET BY MOUTH NIGHTLY    tamsulosin (FLOMAX) 0.4 mg capsule Take 0.4 mg by mouth daily.  aspirin 81 mg tablet Take 81 mg by mouth daily.  diclofenac EC (VOLTAREN) 75 mg EC tablet Take 75 mg by mouth daily. No current facility-administered medications on file prior to visit. CARDIOLOGY STUDIES TO DATE:  2/9/11. Adenosine cardiolyte. Cardiac stress testing was pos for chest pain and myocardial ischemia. Myocardial perfusion imaging showed reversible deficits. of the ant lat wall Gated EF (%): 52        Chief Complaint   Patient presents with    Coronary Artery Disease     HPI :  Mr. Mayela Monreal comes in with a number of different complaints with his wife in attendance. For months, he has felt increasing fatigue, feels like he is losing muscle strength and he is having lots of different neurologic symptoms with back, hands and so forth.   He had a couple of episodes of shortness of breath and dizziness playing tennis during the last week, which he thinks reminds him of what he had prior to his bypass operation. We went back through all his notes for the last four or five years together and he has complained of a lot of these things off and on ever since surgery. When we last did a catheterization on him, the shortness of breath that he had resolved following the catheterization and his resumption of Advair, which he has not taken for quite some time now. He has not had any recent blood work. CARDIAC ROS:   negative for chest pain, palpitations, syncope, orthopnea, paroxysmal nocturnal dyspnea, exertional chest pressure/discomfort, claudication, lower extremity edema    No family history on file. Past Medical History:   Diagnosis Date    Coronary Atherosclerosis Coronary Unspecified 11/19/2010    Mixed hyperlipidemia        GENERAL ROS:  A comprehensive review of systems was negative except for that written in the HPI.     Visit Vitals  /70 (BP 1 Location: Left arm, BP Patient Position: Standing)   Pulse 68   Resp 16   Ht 5' 10\" (1.778 m)   Wt 186 lb 3.2 oz (84.5 kg)   BMI 26.72 kg/m²       Wt Readings from Last 3 Encounters:   01/31/19 186 lb 3.2 oz (84.5 kg)   07/19/18 175 lb (79.4 kg)   06/13/18 178 lb (80.7 kg)            BP Readings from Last 3 Encounters:   01/31/19 118/70   07/19/18 129/70   06/13/18 116/70       PHYSICAL EXAM  General appearance: alert, cooperative, no distress, appears stated age  Neurologic: Alert and oriented X 3  Neck: supple, symmetrical, trachea midline, no adenopathy, no carotid bruit and no JVD  Lungs: clear to auscultation bilaterally  Heart: regular rate and rhythm, S1, S2 normal, no murmur, click, rub or gallop  Extremities: extremities normal, atraumatic, no cyanosis or edema    Lab Results   Component Value Date/Time    Cholesterol, total 203 (H) 06/14/2018 08:32 AM    Cholesterol, total 160 06/19/2017 07:29 AM    Cholesterol, total 194 07/29/2016 07:33 AM    Cholesterol, total 209 (H) 06/03/2016 08:01 AM    Cholesterol, total 170 05/02/2015 07:42 AM    HDL Cholesterol 67 06/14/2018 08:32 AM    HDL Cholesterol 78 06/19/2017 07:29 AM    HDL Cholesterol 84 07/29/2016 07:33 AM    HDL Cholesterol 70 06/03/2016 08:01 AM    HDL Cholesterol 69 05/02/2015 07:42 AM    LDL, calculated 122 (H) 06/14/2018 08:32 AM    LDL, calculated 72 06/19/2017 07:29 AM    LDL, calculated 97 07/29/2016 07:33 AM    LDL, calculated 126 (H) 06/03/2016 08:01 AM    LDL, calculated 87 05/02/2015 07:42 AM    Triglyceride 69 06/14/2018 08:32 AM    Triglyceride 48 06/19/2017 07:29 AM    Triglyceride 67 07/29/2016 07:33 AM    Triglyceride 63 06/03/2016 08:01 AM    Triglyceride 71 05/02/2015 07:42 AM     ASSESSMENT  I am not sure what is going on with Mr. Shira Franco. His EKG today is unchanged with a right bundle-branch block, first degree AV block and a PVC. His shortness of breath could be an anginal equivalent, but I want him to start the Advair again. We are going to send him for screening blood work including a CPK and I want him to stop the Pravastatin. We will get an echocardiogram and depending on what his labs show, either proceed with stress imaging or we may go straight to cardiac catheterization. I told him if he has any prolonged symptoms at rest, he needs to call [phone]. current treatment plan is effective, no change in therapy  lab results and schedule of future lab studies reviewed with patient  reviewed diet, exercise and weight control    Encounter Diagnoses   Name Primary?  Atherosclerosis of native coronary artery of native heart without angina pectoris Yes    Mixed hyperlipidemia     Dizziness     Shortness of breath      Orders Placed This Encounter    AMB POC EKG ROUTINE W/ 12 LEADS, INTER & REP       Follow-up Disposition:  Return in about 6 months (around 7/31/2019).     Rebbeca Lesch, MD  1/31/2019

## 2019-02-01 ENCOUNTER — HOSPITAL ENCOUNTER (OUTPATIENT)
Dept: LAB | Age: 74
Discharge: HOME | End: 2019-02-01
Payer: MEDICARE

## 2019-02-01 ENCOUNTER — TELEPHONE (OUTPATIENT)
Dept: CARDIOLOGY CLINIC | Age: 74
End: 2019-02-01

## 2019-02-01 PROCEDURE — 82550 ASSAY OF CK (CPK): CPT

## 2019-02-01 PROCEDURE — 80061 LIPID PANEL: CPT

## 2019-02-01 PROCEDURE — 85027 COMPLETE CBC AUTOMATED: CPT

## 2019-02-01 PROCEDURE — 36415 COLL VENOUS BLD VENIPUNCTURE: CPT

## 2019-02-01 PROCEDURE — 80053 COMPREHEN METABOLIC PANEL: CPT

## 2019-02-01 PROCEDURE — 84443 ASSAY THYROID STIM HORMONE: CPT

## 2019-02-01 NOTE — TELEPHONE ENCOUNTER
Called Mrs. Thaddeus Carter. She said she received a letter from their insurance with lab results done in December and one of the results was a pro-bnp level of 798. She said she is not sure what this means, but wanted Dr. Sabrina Raymond to know result because the letter said notify your cardiologist. She said they will probably not release results to our office so patient will bring a copy for Dr. Sabrina Raymond when he comes in for echo Monday. She also said patient had the labs Dr. Sabrina Raymond ordered drawn today. She denied further questions or concerns.

## 2019-02-01 NOTE — TELEPHONE ENCOUNTER
Pt's wife calling to speak with a nurse about a letter that their insurance company sent about results of a test.    She can be reached at [phone].     [employer]

## 2019-02-02 LAB
ALBUMIN SERPL-MCNC: 4.2 G/DL (ref 3.5–4.8)
ALBUMIN/GLOB SERPL: 1.8 {RATIO} (ref 1.2–2.2)
ALP SERPL-CCNC: 99 IU/L (ref 39–117)
ALT SERPL-CCNC: 21 IU/L (ref 0–44)
AST SERPL-CCNC: 13 IU/L (ref 0–40)
BILIRUB SERPL-MCNC: 0.6 MG/DL (ref 0–1.2)
BUN SERPL-MCNC: 17 MG/DL (ref 8–27)
BUN/CREAT SERPL: 13 (ref 10–24)
CALCIUM SERPL-MCNC: 9.3 MG/DL (ref 8.6–10.2)
CHLORIDE SERPL-SCNC: 104 MMOL/L (ref 96–106)
CHOLEST SERPL-MCNC: 184 MG/DL (ref 100–199)
CK SERPL-CCNC: 72 U/L (ref 24–204)
CO2 SERPL-SCNC: 27 MMOL/L (ref 20–29)
CREAT SERPL-MCNC: 1.31 MG/DL (ref 0.76–1.27)
ERYTHROCYTE [DISTWIDTH] IN BLOOD BY AUTOMATED COUNT: 13.1 % (ref 12.3–15.4)
GLOBULIN SER CALC-MCNC: 2.4 G/DL (ref 1.5–4.5)
GLUCOSE SERPL-MCNC: 105 MG/DL (ref 65–99)
HCT VFR BLD AUTO: 44.2 % (ref 37.5–51)
HDLC SERPL-MCNC: 65 MG/DL
HGB BLD-MCNC: 15.1 G/DL (ref 13–17.7)
INTERPRETATION, 910389: NORMAL
INTERPRETATION: NORMAL
LDLC SERPL CALC-MCNC: 104 MG/DL (ref 0–99)
MCH RBC QN AUTO: 31.9 PG (ref 26.6–33)
MCHC RBC AUTO-ENTMCNC: 34.2 G/DL (ref 31.5–35.7)
MCV RBC AUTO: 93 FL (ref 79–97)
PDF IMAGE, 910387: NORMAL
PLATELET # BLD AUTO: 170 X10E3/UL (ref 150–379)
POTASSIUM SERPL-SCNC: 4.7 MMOL/L (ref 3.5–5.2)
PROT SERPL-MCNC: 6.6 G/DL (ref 6–8.5)
RBC # BLD AUTO: 4.74 X10E6/UL (ref 4.14–5.8)
SODIUM SERPL-SCNC: 143 MMOL/L (ref 134–144)
TRIGL SERPL-MCNC: 74 MG/DL (ref 0–149)
TSH SERPL DL<=0.005 MIU/L-ACNC: 1.75 UIU/ML (ref 0.45–4.5)
VLDLC SERPL CALC-MCNC: 15 MG/DL (ref 5–40)
WBC # BLD AUTO: 5.5 X10E3/UL (ref 3.4–10.8)

## 2019-02-04 ENCOUNTER — CLINICAL SUPPORT (OUTPATIENT)
Dept: CARDIOLOGY CLINIC | Age: 74
End: 2019-02-04

## 2019-02-04 ENCOUNTER — TELEPHONE (OUTPATIENT)
Dept: CARDIOLOGY CLINIC | Age: 74
End: 2019-02-04

## 2019-02-04 VITALS
SYSTOLIC BLOOD PRESSURE: 118 MMHG | WEIGHT: 186 LBS | DIASTOLIC BLOOD PRESSURE: 70 MMHG | BODY MASS INDEX: 26.63 KG/M2 | HEIGHT: 70 IN

## 2019-02-04 DIAGNOSIS — I25.10 ATHEROSCLEROSIS OF CORONARY ARTERY, ANGINA PRESENCE UNSPECIFIED, UNSPECIFIED VESSEL OR LESION TYPE, UNSPECIFIED WHETHER NATIVE OR TRANSPLANTED HEART: Primary | ICD-10-CM

## 2019-02-04 DIAGNOSIS — E78.2 MIXED HYPERLIPIDEMIA: ICD-10-CM

## 2019-02-04 DIAGNOSIS — R06.02 SOB (SHORTNESS OF BREATH): ICD-10-CM

## 2019-02-04 DIAGNOSIS — R42 DIZZINESS: ICD-10-CM

## 2019-02-04 DIAGNOSIS — I25.10 ATHEROSCLEROSIS OF NATIVE CORONARY ARTERY OF NATIVE HEART WITHOUT ANGINA PECTORIS: ICD-10-CM

## 2019-02-04 DIAGNOSIS — R06.02 SHORTNESS OF BREATH: ICD-10-CM

## 2019-02-04 LAB
ECHO AO ASC DIAM: 3.19 CM
ECHO AO ROOT DIAM: 3.56 CM
ECHO AV AREA PEAK VELOCITY: 1.7 CM2
ECHO AV AREA VTI: 1.8 CM2
ECHO AV AREA/BSA PEAK VELOCITY: 0.8 CM2/M2
ECHO AV AREA/BSA VTI: 0.9 CM2/M2
ECHO AV MEAN GRADIENT: 9.3 MMHG
ECHO AV PEAK GRADIENT: 17.8 MMHG
ECHO AV PEAK VELOCITY: 211.14 CM/S
ECHO AV VTI: 42.57 CM
ECHO LA AREA 4C: 24.8 CM2
ECHO LA MAJOR AXIS: 4.98 CM
ECHO LA TO AORTIC ROOT RATIO: 1.4
ECHO LA VOL 2C: 71 ML (ref 18–58)
ECHO LA VOL 4C: 71.53 ML (ref 18–58)
ECHO LA VOL BP: 79.32 ML (ref 18–58)
ECHO LA VOL/BSA BIPLANE: 39.19 ML/M2 (ref 16–28)
ECHO LA VOLUME INDEX A2C: 35.08 ML/M2 (ref 16–28)
ECHO LA VOLUME INDEX A4C: 35.34 ML/M2 (ref 16–28)
ECHO LV E' LATERAL VELOCITY: 5.32 CM/S
ECHO LV E' SEPTAL VELOCITY: 6.18 CM/S
ECHO LV EDV A2C: 99.6 ML
ECHO LV EDV A4C: 126.8 ML
ECHO LV EDV BP: 118.4 ML (ref 67–155)
ECHO LV EDV INDEX A4C: 62.7 ML/M2
ECHO LV EDV INDEX BP: 58.5 ML/M2
ECHO LV EDV NDEX A2C: 49.2 ML/M2
ECHO LV EJECTION FRACTION A2C: 44 %
ECHO LV EJECTION FRACTION A4C: 59 %
ECHO LV EJECTION FRACTION BIPLANE: 54.3 % (ref 55–100)
ECHO LV ESV A2C: 55.8 ML
ECHO LV ESV A4C: 51.6 ML
ECHO LV ESV BP: 54.1 ML (ref 22–58)
ECHO LV ESV INDEX A2C: 27.6 ML/M2
ECHO LV ESV INDEX A4C: 25.5 ML/M2
ECHO LV ESV INDEX BP: 26.7 ML/M2
ECHO LV INTERNAL DIMENSION DIASTOLIC: 5.52 CM (ref 4.2–5.9)
ECHO LV INTERNAL DIMENSION SYSTOLIC: 4.27 CM
ECHO LV IVSD: 1.17 CM (ref 0.6–1)
ECHO LV MASS 2D: 282.3 G (ref 88–224)
ECHO LV MASS INDEX 2D: 139.5 G/M2 (ref 49–115)
ECHO LV POSTERIOR WALL DIASTOLIC: 0.98 CM (ref 0.6–1)
ECHO LVOT DIAM: 2.25 CM
ECHO LVOT PEAK GRADIENT: 3.3 MMHG
ECHO LVOT PEAK VELOCITY: 91.06 CM/S
ECHO LVOT SV: 75.5 ML
ECHO LVOT VTI: 19.02 CM
ECHO MV A VELOCITY: 61.58 CM/S
ECHO MV AREA PHT: 3.2 CM2
ECHO MV E DECELERATION TIME (DT): 236.8 MS
ECHO MV E VELOCITY: 0.29 CM/S
ECHO MV E/A RATIO: 0
ECHO MV E/E' LATERAL: 0.05
ECHO MV E/E' RATIO (AVERAGED): 0.05
ECHO MV E/E' SEPTAL: 0.05
ECHO MV PRESSURE HALF TIME (PHT): 68.7 MS
ECHO PULMONARY ARTERY SYSTOLIC PRESSURE (PASP): 23 MMHG
ECHO TV REGURGITANT MAX VELOCITY: 210.29 CM/S
ECHO TV REGURGITANT PEAK GRADIENT: 17.7 MMHG

## 2019-02-04 NOTE — TELEPHONE ENCOUNTER
Called patient. Verified patient's identity with two identifiers. Notified patient of lab and echo results. I told him Dr. Susan Philip would like him to have a lexiscan stress test and it needs to be done at [facility] if he is agreeable. Patient agreed. I told him I will order it and someone will call him to schedule this. Message sent to [employer]. Patient verbalized understanding and denied further questions or concerns.

## 2019-02-04 NOTE — TELEPHONE ENCOUNTER
----- Message from Mitzi Clayton MD sent at 2/4/2019  7:35 AM EST -----  Labs including thyroid and muscle enzyme test(for his complaints of muscle wasting and weakness) all look good. Lets get a lexiscan cardiolyte on him.

## 2019-02-04 NOTE — TELEPHONE ENCOUNTER
----- Message from Miguel Sanchez MD sent at 2/4/2019  7:35 AM EST -----  Labs including thyroid and muscle enzyme test(for his complaints of muscle wasting and weakness) all look good. Lets get a lexiscan cardiolyte on him.

## 2019-02-04 NOTE — PROGRESS NOTES
Heart muscle function is good. Couple mildly leaky valves, not a problem or cause for symptoms.  Looks great

## 2019-02-04 NOTE — TELEPHONE ENCOUNTER
Patient had an echo this morning. Once read, I will call patient with both results and schedule lexiscan at that time.

## 2019-02-04 NOTE — PROGRESS NOTES
Labs including thyroid and muscle enzyme test(for his complaints of muscle wasting and weakness) all look good. Lets get a lexiscan cardiolyte on him.

## 2019-02-06 ENCOUNTER — HOSPITAL ENCOUNTER (OUTPATIENT)
Dept: NON INVASIVE DIAGNOSTICS | Age: 74
Discharge: HOME | End: 2019-02-06
Attending: SPECIALIST
Payer: MEDICARE

## 2019-02-06 ENCOUNTER — HOSPITAL ENCOUNTER (OUTPATIENT)
Dept: NUCLEAR MEDICINE | Age: 74
Discharge: HOME | End: 2019-02-06
Attending: SPECIALIST
Payer: MEDICARE

## 2019-02-06 VITALS — HEIGHT: 70 IN | BODY MASS INDEX: 26.05 KG/M2 | WEIGHT: 182 LBS

## 2019-02-06 DIAGNOSIS — R06.02 SOB (SHORTNESS OF BREATH): ICD-10-CM

## 2019-02-06 DIAGNOSIS — I25.10 ATHEROSCLEROSIS OF CORONARY ARTERY, ANGINA PRESENCE UNSPECIFIED, UNSPECIFIED VESSEL OR LESION TYPE, UNSPECIFIED WHETHER NATIVE OR TRANSPLANTED HEART: ICD-10-CM

## 2019-02-06 LAB
STRESS BASELINE HR: 59 BPM
STRESS ESTIMATED WORKLOAD: 1 METS
STRESS EXERCISE DUR MIN: NORMAL
STRESS PEAK DIAS BP: 79 MMHG
STRESS PEAK SYS BP: 143 MMHG
STRESS PERCENT HR ACHIEVED: 58 %
STRESS POST PEAK HR: 72 BPM
STRESS RATE PRESSURE PRODUCT: NORMAL BPM*MMHG
STRESS ST DEPRESSION: 0 MM
STRESS ST ELEVATION: 0 MM
STRESS TARGET HR: 124 BPM

## 2019-02-06 PROCEDURE — 93017 CV STRESS TEST TRACING ONLY: CPT

## 2019-02-06 PROCEDURE — 78452 HT MUSCLE IMAGE SPECT MULT: CPT

## 2019-02-06 PROCEDURE — 74011250636 HC RX REV CODE- 250/636: Performed by: SPECIALIST

## 2019-02-06 RX ORDER — SODIUM CHLORIDE 0.9 % (FLUSH) 0.9 %
5-10 SYRINGE (ML) INJECTION EVERY 8 HOURS
Status: DISCONTINUED | OUTPATIENT
Start: 2019-02-06 | End: 2019-02-10 | Stop reason: HOSPADM

## 2019-02-06 RX ADMIN — REGADENOSON 0.4 MG: 0.08 INJECTION, SOLUTION INTRAVENOUS at 09:21

## 2019-02-06 RX ADMIN — REGADENOSON 0.4 MG: 0.08 INJECTION, SOLUTION INTRAVENOUS at 10:23

## 2019-02-06 RX ADMIN — Medication 20 ML: at 10:23

## 2019-02-06 RX ADMIN — Medication 10 ML: at 09:22

## 2019-02-07 NOTE — PROGRESS NOTES
Stress test is normal, no evidence of any blockages as cause for symptoms. Will discuss further when he follows up.

## 2019-02-08 NOTE — TELEPHONE ENCOUNTER
Called patient. Verified patient's identity with two identifiers. Notified patient of results. I told him Dr. Jose Valerio will discuss with him further Monday, but I wanted to let him know so he did not worry over the weekend. Patient verbalized understanding and denied further questions or concerns.

## 2019-02-08 NOTE — TELEPHONE ENCOUNTER
----- Message from Jayden Moyer MD sent at 2/7/2019  5:29 PM EST -----  Stress test is normal, no evidence of any blockages as cause for symptoms. Will discuss further when he follows up.

## 2019-02-11 ENCOUNTER — OFFICE VISIT (OUTPATIENT)
Dept: CARDIOLOGY CLINIC | Age: 74
End: 2019-02-11

## 2019-02-11 VITALS
DIASTOLIC BLOOD PRESSURE: 82 MMHG | WEIGHT: 183 LBS | HEIGHT: 70 IN | SYSTOLIC BLOOD PRESSURE: 112 MMHG | BODY MASS INDEX: 26.2 KG/M2 | OXYGEN SATURATION: 97 % | HEART RATE: 68 BPM

## 2019-02-11 DIAGNOSIS — I25.10 ATHEROSCLEROSIS OF NATIVE CORONARY ARTERY OF NATIVE HEART WITHOUT ANGINA PECTORIS: Primary | ICD-10-CM

## 2019-02-11 DIAGNOSIS — R42 DIZZINESS: ICD-10-CM

## 2019-02-11 DIAGNOSIS — E78.2 MIXED HYPERLIPIDEMIA: ICD-10-CM

## 2019-02-11 DIAGNOSIS — R06.02 SHORTNESS OF BREATH: ICD-10-CM

## 2019-02-11 NOTE — PROGRESS NOTES
HISTORY OF PRESENT ILLNESS Austin Coker is a 75 y.o. male SUMMARY:  
Problem List  Date Reviewed: 2/8/2019 Codes Class Noted Mixed hyperlipidemia ICD-10-CM: E78.2 ICD-9-CM: 272.2  Unknown Coronary atherosclerosis ICD-10-CM: I25.10 ICD-9-CM: 414.00  11/19/2010 Overview Addendum 2/17/2011  3:26 PM by Gonzales Avalos MD  
  Mr. Dany Kee presented in January 2007 with progressive angina. He underwent coronary arteriography, which showed significant multivessel coronary disease with overall preserved LV function. He then underwent bypass surgery with an internal mammary to the LAD, a vein graft to the circumflex marginal and a vein graft to the posterior descending. Cath 2/11 showed sig 3v disease, patent svg to lcx and lima to lad, occ svg to rca with left to right collaterals. Probable culprit diffusely diseased diagonal, less than 2mm. lvef 50% Current Outpatient Medications on File Prior to Visit Medication Sig  tamsulosin (FLOMAX) 0.4 mg capsule Take 0.4 mg by mouth daily.  aspirin 81 mg tablet Take 81 mg by mouth daily.  diclofenac EC (VOLTAREN) 75 mg EC tablet Take 75 mg by mouth daily.  pravastatin (PRAVACHOL) 40 mg tablet TAKE 1 TABLET BY MOUTH NIGHTLY No current facility-administered medications on file prior to visit. CARDIOLOGY STUDIES TO DATE: 
2/9/11. Adenosine cardiolyte. Cardiac stress testing was pos for chest pain and myocardial ischemia. Myocardial perfusion imaging showed reversible deficits. of the ant lat wall Gated EF (%): 49 
 
2/19 negative lexiscan cardiolyte 2/19 echo with lvef 50%, lae, mild mr and tr without pul htn Chief Complaint Patient presents with  Coronary Artery Disease HPI : 
Mr. Dany Kee is doing better. Less shortness of breath. He is sleeping better, but still lots of aches and pains and paresthesias in both his hands and his feet, presumably from his back disease.   We did a bunch of blood work on him when we last met and everything looked good, except his comprehensive metabolic suggested he was somewhat prerenal, and this may explain some of the orthostatic type symptoms he is having. His CPK was normal, so I told him he can go back on the Pravachol. We reviewed all his recent cardiac testing, which was reassuringly negative. CARDIAC ROS:  
negative for chest pain, palpitations, syncope, orthopnea, paroxysmal nocturnal dyspnea, exertional chest pressure/discomfort, claudication, lower extremity edema No family history on file. Past Medical History:  
Diagnosis Date  Coronary Atherosclerosis Coronary Unspecified 11/19/2010  Mixed hyperlipidemia GENERAL ROS: 
A comprehensive review of systems was negative except for that written in the HPI. Visit Vitals /82 (BP 1 Location: Left arm, BP Patient Position: Sitting) Pulse 68 Ht 5' 10\" (1.778 m) Wt 183 lb (83 kg) SpO2 97% BMI 26.26 kg/m² Wt Readings from Last 3 Encounters:  
02/11/19 183 lb (83 kg) 02/06/19 182 lb (82.6 kg) 02/04/19 186 lb (84.4 kg) BP Readings from Last 3 Encounters:  
02/11/19 112/82  
02/04/19 118/70  
01/31/19 118/70 PHYSICAL EXAM 
General appearance: alert, cooperative, no distress, appears stated age Neurologic: Alert and oriented X 3 Neck: supple, symmetrical, trachea midline, no adenopathy, no carotid bruit and no JVD Lungs: clear to auscultation bilaterally Heart: regular rate and rhythm, S1, S2 normal, no murmur, click, rub or gallop Extremities: extremities normal, atraumatic, no cyanosis or edema Lab Results Component Value Date/Time  Cholesterol, total 184 02/01/2019 07:09 AM  
 Cholesterol, total 203 (H) 06/14/2018 08:32 AM  
 Cholesterol, total 160 06/19/2017 07:29 AM  
 Cholesterol, total 194 07/29/2016 07:33 AM  
 Cholesterol, total 209 (H) 06/03/2016 08:01 AM  
 HDL Cholesterol 65 02/01/2019 07:09 AM  
 HDL Cholesterol 67 06/14/2018 08:32 AM  
 HDL Cholesterol 78 06/19/2017 07:29 AM  
 HDL Cholesterol 84 07/29/2016 07:33 AM  
 HDL Cholesterol 70 06/03/2016 08:01 AM  
 LDL, calculated 104 (H) 02/01/2019 07:09 AM  
 LDL, calculated 122 (H) 06/14/2018 08:32 AM  
 LDL, calculated 72 06/19/2017 07:29 AM  
 LDL, calculated 97 07/29/2016 07:33 AM  
 LDL, calculated 126 (H) 06/03/2016 08:01 AM  
 Triglyceride 74 02/01/2019 07:09 AM  
 Triglyceride 69 06/14/2018 08:32 AM  
 Triglyceride 48 06/19/2017 07:29 AM  
 Triglyceride 67 07/29/2016 07:33 AM  
 Triglyceride 63 06/03/2016 08:01 AM  
 
ASSESSMENT At this point, Mr. Patel Memory symptoms and test results are not pointing to a cardiac etiology, except for perhaps a mild orthostasis, so we talked about cutting back on his caffeine and increasing his water intake. The only other test we could do at this time to be 100% sure is a cardiac catheterization and I am not sure that is necessary. I encouraged him to go see his primary care physician to look for any other potential hormonal or metabolic reasons for his symptoms and we again talked about sleep apnea, which he does not want to pursue at this time. current treatment plan is effective, no change in therapy 
lab results and schedule of future lab studies reviewed with patient 
reviewed diet, exercise and weight control Encounter Diagnoses Name Primary?  Atherosclerosis of native coronary artery of native heart without angina pectoris Yes  Mixed hyperlipidemia  Dizziness  Shortness of breath No orders of the defined types were placed in this encounter. Follow-up Disposition: 
Return in about 3 months (around 5/11/2019). Baylee Russell MD2/11/2019

## 2019-05-13 ENCOUNTER — OFFICE VISIT (OUTPATIENT)
Dept: CARDIOLOGY CLINIC | Age: 74
End: 2019-05-13

## 2019-05-13 VITALS
BODY MASS INDEX: 25.77 KG/M2 | SYSTOLIC BLOOD PRESSURE: 108 MMHG | OXYGEN SATURATION: 98 % | HEART RATE: 69 BPM | RESPIRATION RATE: 14 BRPM | WEIGHT: 180 LBS | DIASTOLIC BLOOD PRESSURE: 72 MMHG | HEIGHT: 70 IN

## 2019-05-13 DIAGNOSIS — R20.0 NUMBNESS: ICD-10-CM

## 2019-05-13 DIAGNOSIS — I25.10 ATHEROSCLEROSIS OF NATIVE CORONARY ARTERY OF NATIVE HEART WITHOUT ANGINA PECTORIS: Primary | ICD-10-CM

## 2019-05-13 DIAGNOSIS — E78.2 MIXED HYPERLIPIDEMIA: ICD-10-CM

## 2019-05-13 DIAGNOSIS — I25.10 ATHEROSCLEROSIS OF CORONARY ARTERY OF NATIVE HEART WITHOUT ANGINA PECTORIS, UNSPECIFIED VESSEL OR LESION TYPE: ICD-10-CM

## 2019-05-13 RX ORDER — PRAVASTATIN SODIUM 40 MG/1
TABLET ORAL
Qty: 90 TAB | Refills: 1 | Status: SHIPPED | OUTPATIENT
Start: 2019-05-13 | End: 2019-11-21

## 2019-05-13 NOTE — PROGRESS NOTES
HISTORY OF PRESENT ILLNESS  Alycia Dobbins is a 75 y.o. male     SUMMARY:   Problem List  Date Reviewed: 5/13/2019          Codes Class Noted    Mixed hyperlipidemia ICD-10-CM: E78.2  ICD-9-CM: 272.2  Unknown        Coronary atherosclerosis ICD-10-CM: I25.10  ICD-9-CM: 414.00  11/19/2010    Overview Addendum 2/17/2011  3:26 PM by Ángel Juarez MD     Mr. Marquis Mckinley presented in January 2007 with progressive angina. He underwent coronary arteriography, which showed significant multivessel coronary disease with overall preserved LV function. He then underwent bypass surgery with an internal mammary to the LAD, a vein graft to the circumflex marginal and a vein graft to the posterior descending. Cath 2/11 showed sig 3v disease, patent svg to lcx and lima to lad, occ svg to rca with left to right collaterals. Probable culprit diffusely diseased diagonal, less than 2mm. lvef 50%                   Current Outpatient Medications on File Prior to Visit   Medication Sig    tamsulosin (FLOMAX) 0.4 mg capsule Take 0.4 mg by mouth daily.  aspirin 81 mg tablet Take 81 mg by mouth daily.  diclofenac EC (VOLTAREN) 75 mg EC tablet Take 75 mg by mouth daily. No current facility-administered medications on file prior to visit. CARDIOLOGY STUDIES TO DATE:  2/9/11. Adenosine cardiolyte. Cardiac stress testing was pos for chest pain and myocardial ischemia. Myocardial perfusion imaging showed reversible deficits. of the ant lat wall Gated EF (%): 49    2/19 negative lexiscan cardiolyte  2/19 echo with lvef 50%, lae, mild mr and tr without pul htn    Chief Complaint   Patient presents with    Coronary Artery Disease     HPI :  Mr. Marquis Mckinley is doing okay. He still has some shortness of breath, but in spite of that, he gets some exercise and so forth without any limiting symptoms. His sleep is terrible and he is having ongoing issues with numbness and tingling in both his hands and his feet.   He finds that at night this gets particularly bad and it seems like it is positional, however, when he gets in the same position during the day, it does not seem to bother him. He has not seen an orthopedist or a neurologist.       CARDIAC ROS:   negative for chest pain, palpitations, syncope, orthopnea, paroxysmal nocturnal dyspnea, exertional chest pressure/discomfort, claudication, lower extremity edema    No family history on file. Past Medical History:   Diagnosis Date    Coronary Atherosclerosis Coronary Unspecified 11/19/2010    Mixed hyperlipidemia        GENERAL ROS:  A comprehensive review of systems was negative except for that written in the HPI.     Visit Vitals  /72 (BP 1 Location: Left arm, BP Patient Position: Sitting)   Pulse 69   Resp 14   Ht 5' 10\" (1.778 m)   Wt 180 lb (81.6 kg)   SpO2 98%   BMI 25.83 kg/m²       Wt Readings from Last 3 Encounters:   05/13/19 180 lb (81.6 kg)   02/11/19 183 lb (83 kg)   02/06/19 182 lb (82.6 kg)            BP Readings from Last 3 Encounters:   05/13/19 108/72   02/11/19 112/82   02/04/19 118/70       PHYSICAL EXAM  General appearance: alert, cooperative, no distress, appears stated age  Neurologic: Alert and oriented X 3  Neck: supple, symmetrical, trachea midline, no adenopathy, no carotid bruit and no JVD  Lungs: clear to auscultation bilaterally  Heart: regular rate and rhythm, S1, S2 normal, no murmur, click, rub or gallop  Extremities: extremities normal, atraumatic, no cyanosis or edema    Lab Results   Component Value Date/Time    Cholesterol, total 184 02/01/2019 07:09 AM    Cholesterol, total 203 (H) 06/14/2018 08:32 AM    Cholesterol, total 160 06/19/2017 07:29 AM    Cholesterol, total 194 07/29/2016 07:33 AM    Cholesterol, total 209 (H) 06/03/2016 08:01 AM    HDL Cholesterol 65 02/01/2019 07:09 AM    HDL Cholesterol 67 06/14/2018 08:32 AM    HDL Cholesterol 78 06/19/2017 07:29 AM    HDL Cholesterol 84 07/29/2016 07:33 AM    HDL Cholesterol 70 06/03/2016 08:01 AM    LDL, calculated 104 (H) 02/01/2019 07:09 AM    LDL, calculated 122 (H) 06/14/2018 08:32 AM    LDL, calculated 72 06/19/2017 07:29 AM    LDL, calculated 97 07/29/2016 07:33 AM    LDL, calculated 126 (H) 06/03/2016 08:01 AM    Triglyceride 74 02/01/2019 07:09 AM    Triglyceride 69 06/14/2018 08:32 AM    Triglyceride 48 06/19/2017 07:29 AM    Triglyceride 67 07/29/2016 07:33 AM    Triglyceride 63 06/03/2016 08:01 AM     ASSESSMENT  Mr. Anna Ceballos is stable and I think asymptomatic from a cardiac perspective. He is going to go back on the Pravastatin since his CPK was negative and being off of it has not changed any of his symptoms. I am going to refer him to neurology to see if they can sort out what is causing these symptoms and get him some relief. He needs no further cardiac testing at this time. current treatment plan is effective, no change in therapy  lab results and schedule of future lab studies reviewed with patient  reviewed diet, exercise and weight control    Encounter Diagnoses   Name Primary?  Atherosclerosis of native coronary artery of native heart without angina pectoris Yes    Mixed hyperlipidemia      No orders of the defined types were placed in this encounter. Follow-up and Dispositions    · Return in about 6 months (around 11/13/2019).          Kayleen Myers MD  5/13/2019

## 2019-05-24 ENCOUNTER — OFFICE VISIT (OUTPATIENT)
Dept: NEUROLOGY | Age: 74
End: 2019-05-24

## 2019-05-24 VITALS
OXYGEN SATURATION: 97 % | SYSTOLIC BLOOD PRESSURE: 100 MMHG | DIASTOLIC BLOOD PRESSURE: 70 MMHG | HEIGHT: 70 IN | BODY MASS INDEX: 25.71 KG/M2 | WEIGHT: 179.6 LBS | HEART RATE: 68 BPM | RESPIRATION RATE: 16 BRPM

## 2019-05-24 DIAGNOSIS — G56.03 BILATERAL CARPAL TUNNEL SYNDROME: Primary | ICD-10-CM

## 2019-05-24 RX ORDER — METHYLPREDNISOLONE 4 MG/1
TABLET ORAL
Qty: 1 DOSE PACK | Refills: 0 | Status: SHIPPED | OUTPATIENT
Start: 2019-05-24 | End: 2019-11-21

## 2019-05-24 NOTE — PROGRESS NOTES
Chief Complaint   Patient presents with    Neurologic Problem         HISTORY OF PRESENT ILLNESS  Hiren Lee is a 75 y.o. male who came in for an evaluation regarding numbness and. He says that has been going on for at least 5 years but recently it has been bothering him more. Sometimes he experiences burning sensation in his fingertips and it will wake him up at night. He finds himself shaking his hands often to feel better. Cannot think of any alleviating or precipitating factors. Denies any weakness of the hands or arms. No neck pain. No symptoms in the lower extremities. 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 He also has chronic low back pain for which he takes diclofenac regularly and it does not help with his hand symptoms. Past Medical History:   Diagnosis Date    Coronary Atherosclerosis Coronary Unspecified 2010    Mixed hyperlipidemia      Current Outpatient Medications   Medication Sig    methylPREDNISolone (MEDROL DOSEPACK) 4 mg tablet Take as directed    pravastatin (PRAVACHOL) 40 mg tablet TAKE 1 TABLET BY MOUTH NIGHTLY    tamsulosin (FLOMAX) 0.4 mg capsule Take 0.4 mg by mouth daily.  aspirin 81 mg tablet Take 81 mg by mouth daily.  diclofenac EC (VOLTAREN) 75 mg EC tablet Take 75 mg by mouth daily. No current facility-administered medications for this visit. Allergies   Allergen Reactions    Ciprofloxacin Rash and Other (comments)     fever    Dilaudid [Hydromorphone] Unknown (comments)     BP problems    Sulfa (Sulfonamide Antibiotics) Rash and Other (comments)     Fever       History reviewed. No pertinent family history.   Social History     Tobacco Use    Smoking status: Former Smoker     Last attempt to quit: 1970     Years since quittin.4    Smokeless tobacco: Never Used   Substance Use Topics    Alcohol use: No    Drug use: No     Past Surgical History:   Procedure Laterality Date    HX CORONARY ARTERY BYPASS GRAFT      HX HEART CATHETERIZATION           REVIEW OF SYSTEMS  Review of Systems - History obtained from the patient  Psychological ROS: negative  ENT ROS: negative  Hematological and Lymphatic ROS: negative  Endocrine ROS: negative  Respiratory ROS: no cough, shortness of breath, or wheezing  Cardiovascular ROS: no chest pain or dyspnea on exertion  Gastrointestinal ROS: no abdominal pain, change in bowel habits, or black or bloody stools  Genito-Urinary ROS: no dysuria, trouble voiding, or hematuria  Musculoskeletal ROS: negative  Dermatological ROS: negative      PHYSICAL EXAMINATION:    Visit Vitals  /70   Pulse 68   Resp 16   Ht 5' 10\" (1.778 m)   Wt 81.5 kg (179 lb 9.6 oz)   SpO2 97%   BMI 25.77 kg/m²     General:  Well defined, nourished, and groomed individual in no acute distress. Neck: Supple, nontender, no bruits, no pain with resistance to active range of motion. Heart: Regular rate and rhythm, no murmurs, rub, or gallop. Normal S1S2. Lungs:  Clear to auscultation bilaterally with equal chest expansion, no cough, no wheeze  Musculoskeletal:  Extremities revealed no edema and had full range of motion of joints. Psych:  Good mood and bright affect    NEUROLOGICAL EXAMINATION:     Mental Status:   Alert and oriented to person, place, and time with recent and remote memory intact. Attention span and concentration are normal. Speech is fluent with a full fund of knowledge. Cranial Nerves:    II, III, IV, VI:  Visual acuity grossly intact. Visual fields are normal.    Pupils are equal, round, and reactive to light and accommodation. Extra-ocular movements are full and fluid. V-XII: Hearing is grossly intact. Facial features are symmetric, with normal sensation and strength. The palate rises symmetrically and the tongue protrudes midline. Sternocleidomastoids 5/5. Motor Examination: Normal tone, bulk, and strength. 5/5 muscle strength throughout.   No cogwheel rigidity or clonus present. Sensory exam:  Normal throughout to pinprick, temperature, and vibration sense. Normal proprioception. Coordination:   Finger to nose and rapid arm movement testing was normal.   No resting or intention tremor    Gait and Station:  Steady while walking on toes, heels, and with tandem walking. Normal arm swing. No Rhomberg or pronator drift. No muscle wasting or fasiculations noted. Reflexes:  DTRs 2+ throughout. Toes downgoing. LABS / IMAGING  Lab Results   Component Value Date/Time    WBC 5.5 02/01/2019 07:09 AM    HGB 15.1 02/01/2019 07:09 AM    HCT 44.2 02/01/2019 07:09 AM    PLATELET 721 84/89/8499 07:09 AM    MCV 93 02/01/2019 07:09 AM     Lab Results   Component Value Date/Time    Sodium 143 02/01/2019 07:09 AM    Potassium 4.7 02/01/2019 07:09 AM    Chloride 104 02/01/2019 07:09 AM    CO2 27 02/01/2019 07:09 AM    Anion gap 4 (L) 06/01/2009 12:00 PM    Glucose 105 (H) 02/01/2019 07:09 AM    BUN 17 02/01/2019 07:09 AM    Creatinine 1.31 (H) 02/01/2019 07:09 AM    BUN/Creatinine ratio 13 02/01/2019 07:09 AM    GFR est AA 62 02/01/2019 07:09 AM    GFR est non-AA 53 (L) 02/01/2019 07:09 AM    Calcium 9.3 02/01/2019 07:09 AM    Bilirubin, total 0.6 02/01/2019 07:09 AM    AST (SGOT) 13 02/01/2019 07:09 AM    Alk. phosphatase 99 02/01/2019 07:09 AM    Protein, total 6.6 02/01/2019 07:09 AM    Albumin 4.2 02/01/2019 07:09 AM    Globulin 2.8 05/14/2009 12:15 PM    A-G Ratio 1.8 02/01/2019 07:09 AM    ALT (SGPT) 21 02/01/2019 07:09 AM     Lab Results   Component Value Date/Time    TSH 1.750 02/01/2019 07:09 AM       ASSESSMENT    ICD-10-CM ICD-9-CM    1. Bilateral carpal tunnel syndrome G56.03 354.0 EMG NCV MOTOR WO F/WAVE PER NERVE      methylPREDNISolone (MEDROL DOSEPACK) 4 mg tablet       DISCUSSION  Mr. Cindi Mathis most likely has carpal tunnel syndrome.    I have recommended a trial of wrist splints and take a short course of Medrol  Schedule EMG/NCV for further diagnostic clarification    Efrain De Leon MD  Diplomate, American Board of Psychiatry & Neurology (Neurology)  [place] Board of Psychiatry & Neurology (Clinical Neurophysiology)  Diplomate, American Board of Electrodiagnostic Medicine  This note will not be viewable in [address]

## 2019-05-24 NOTE — PROGRESS NOTES
Mr. Kiya Whipple presents as a new patient for evaluation of numbness of bilateral hands. His symptoms have been present for several years and are worse at night. Depression screening done on patient.

## 2019-05-24 NOTE — PATIENT INSTRUCTIONS
A Healthy Lifestyle: Care Instructions Your Care Instructions A healthy lifestyle can help you feel good, stay at a healthy weight, and have plenty of energy for both work and play. A healthy lifestyle is something you can share with your whole family. A healthy lifestyle also can lower your risk for serious health problems, such as high blood pressure, heart disease, and diabetes. You can follow a few steps listed below to improve your health and the health of your family. Follow-up care is a key part of your treatment and safety. Be sure to make and go to all appointments, and call your doctor if you are having problems. It's also a good idea to know your test results and keep a list of the medicines you take. How can you care for yourself at home? · Do not eat too much sugar, fat, or fast foods. You can still have dessert and treats now and then. The goal is moderation. · Start small to improve your eating habits. Pay attention to portion sizes, drink less juice and soda pop, and eat more fruits and vegetables. ? Eat a healthy amount of food. A 3-ounce serving of meat, for example, is about the size of a deck of cards. Fill the rest of your plate with vegetables and whole grains. ? Limit the amount of soda and sports drinks you have every day. Drink more water when you are thirsty. ? Eat at least 5 servings of fruits and vegetables every day. It may seem like a lot, but it is not hard to reach this goal. A serving or helping is 1 piece of fruit, 1 cup of vegetables, or 2 cups of leafy, raw vegetables. Have an apple or some carrot sticks as an afternoon snack instead of a candy bar. Try to have fruits and/or vegetables at every meal. 
· Make exercise part of your daily routine. You may want to start with simple activities, such as walking, bicycling, or slow swimming. Try to be active 30 to 60 minutes every day.  You do not need to do all 30 to 60 minutes all at once. For example, you can exercise 3 times a day for 10 or 20 minutes. Moderate exercise is safe for most people, but it is always a good idea to talk to your doctor before starting an exercise program. 
· Keep moving. [employer] the lawn, work in the garden, or [employer]. Take the stairs instead of the elevator at work. · If you smoke, quit. People who smoke have an increased risk for heart attack, stroke, cancer, and other lung illnesses. Quitting is hard, but there are ways to boost your chance of quitting tobacco for good. ? Use nicotine gum, patches, or lozenges. ? Ask your doctor about stop-smoking programs and medicines. ? Keep trying. In addition to reducing your risk of diseases in the future, you will notice some benefits soon after you stop using tobacco. If you have shortness of breath or asthma symptoms, they will likely get better within a few weeks after you quit. · Limit how much alcohol you drink. Moderate amounts of alcohol (up to 2 drinks a day for men, 1 drink a day for women) are okay. But drinking too much can lead to liver problems, high blood pressure, and other health problems. Family health If you have a family, there are many things you can do together to improve your health. · Eat meals together as a family as often as possible. · Eat healthy foods. This includes fruits, vegetables, lean meats and dairy, and whole grains. · Include your family in your fitness plan. Most people think of activities such as jogging or tennis as the way to fitness, but there are many ways you and your family can be more active. Anything that makes you breathe hard and gets your heart pumping is exercise. Here are some tips: 
? Walk to do errands or to take your child to school or the bus. 
? Go for a family bike ride after dinner instead of watching TV. Where can you learn more? Go to http://syd-kasia.info/. Enter J092 in the search box to learn more about \"A Healthy Lifestyle: Care Instructions. \" Current as of: September 11, 2018 Content Version: 11.9 © 2379-2258 [employer]. Care instructions adapted under license by [employer] (which disclaims liability or warranty for this information). If you have questions about a medical condition or this instruction, always ask your healthcare professional. [place] [address] any warranty or liability for your use of this information.

## 2019-07-08 ENCOUNTER — OFFICE VISIT (OUTPATIENT)
Dept: NEUROLOGY | Age: 74
End: 2019-07-08

## 2019-07-08 VITALS
DIASTOLIC BLOOD PRESSURE: 71 MMHG | OXYGEN SATURATION: 98 % | WEIGHT: 179 LBS | RESPIRATION RATE: 16 BRPM | BODY MASS INDEX: 25.62 KG/M2 | HEIGHT: 70 IN | HEART RATE: 65 BPM | SYSTOLIC BLOOD PRESSURE: 112 MMHG

## 2019-07-08 DIAGNOSIS — G56.03 BILATERAL CARPAL TUNNEL SYNDROME: Primary | ICD-10-CM

## 2019-07-08 NOTE — PROGRESS NOTES
[facility]  [address] [facility], [address] [facility]  Phone [phone] Fax [phone]  Test Date:  2019    Patient:  Cindy Glaser :  Physician: Cassandra Browne MD   Sex: Male Height: 5' 10\" Ref Phys: Cassandra Browne MD   ID#:  [id number] Weight: 179 lbs. Technician: Isadore Cushing. .EMG TECH     Patient Complaints:  CTS Bilateral    Patient History / Exam:  48-year-old male who is being evaluated for symptoms of numbness and tingling in first second and third digits of both hands, left worse than right. On exam: Alert and fully oriented. Cranial nerves II through XII intact with muscle tone above normal per strength normal in all extremities. DTRs 2/2 and symmetric. Toes downgoing. Positive Phalen's test at the left wrist.     NCV & EMG Findings:  Evaluation of the left median motor and the right median motor nerves showed prolonged distal onset latency (L5.6, R5.5 ms) and decreased conduction velocity (Elbow-Wrist, L43, R46 m/s). The left ulnar motor nerve showed decreased conduction velocity (B Elbow-Wrist, 52 m/s) and decreased conduction velocity (A Elbow-B Elbow, 40 m/s). The left median sensory nerve showed no response (Wrist). The right median sensory nerve showed prolonged distal peak latency (4.9 ms), reduced amplitude (5.4 µV), and decreased conduction velocity (Wrist-2nd Digit, 29 m/s). The left ulnar sensory nerve showed prolonged distal peak latency (4.5 ms), reduced amplitude (9.9 µV), and decreased conduction velocity (Wrist-5th Digit, 31 m/s). The right ulnar sensory nerve showed reduced amplitude (2.6 µV). The left median/ulnar (palm) comparison and the right median/ulnar (palm) comparison nerves showed prolonged distal peak latency (Median Palm, L5.5, R3.3 ms) and abnormal peak latency difference (Median Palm-Ulnar Palm, L3.7, R1.1 ms). All remaining nerves  were within normal limits.       All F Wave latencies were within normal limits. All F Wave left vs. right side latency differences were within normal limits. Needle evaluation of the left abductor pollicis brevis muscle showed increased motor unit amplitude. All remaining muscles (as indicated in the following table) showed no evidence of electrical instability. Impression:  Both median motor onset latencies were moderately delayed. Left median sensory response could not be elicited in the right median sensory response was moderately slow. Palmar peak latency was severely reduced on the left and moderately reduced on the right on the median segment compared to the ulnar. Left ulnar sensory response was mildly slow and right ulnar sensory amplitude was moderately reduced. Concentric needle EMG was performed on selected muscles of both upper extremities. There was no abnormal insertional or spontaneous activity. Motor units were of increased amplitude in the left abductor pollicis brevis muscle but there was no acute denervation. 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 This is severe on the left and moderate on the right. Mild to moderate chronic denervation changes were seen in the left abductor pollicis brevis and mild changes were seen in the right abductor pollicis brevis. No acute denervation was seen on either side. Mild ulnar neuropathy at the left elbow/ulnar sulcus.   Low ulnar sensory amplitude on the right may also represent mild, nonlocalizing ulnar neuropathy on the right or may be of unclear clinical significance    Recommendations:  Referral to hand surgery for carpal tunnel release    ___________________________  Alexander Resendiz MD        Nerve Conduction Studies  Anti Sensory Summary Table     Stim Site NR Peak (ms) Norm Peak (ms) P-T Amp (µV) Norm P-T Amp Onset (ms) Site1 Site2 Delta-P (ms) Dist (cm) Mark (m/s) Norm Mark (m/s)   Left Median Anti Sensory (2nd Digit)  29.5°C   Wrist NR  <3.6  >10  Wrist 2nd Digit  14.0  >39   Right Median Anti Sensory (2nd Digit)  30.5°C   Wrist    4.9 <3.6 5.4 >10 4.3 Wrist 2nd Digit 4.9 14.0 29 >39   Left Radial Anti Sensory (Base 1st Digit)  30.8°C   Wrist    2.7 <3.1 25.4  2.2 Wrist Base 1st Digit 2.7 10.0 37    Right Radial Anti Sensory (Base 1st Digit)  31.3°C   Wrist    2.3 <3.1 29.6  1.9 Wrist Base 1st Digit 2.3 10.0 43    Left Ulnar Anti Sensory (5th Digit)  29.9°C   Wrist    4.5 <3.7 9.9 >15.0 3.7 Wrist 5th Digit 4.5 14.0 31 >38   Right Ulnar Anti Sensory (5th Digit)  30.9°C   Wrist    3.4 <3.7 2.6 >15.0 2.8 Wrist 5th Digit 3.4 14.0 41 >38   B Elbow    3.6  3.0  3.0 B Elbow Wrist 0.2 0.0  >47     Motor Summary Table     Stim Site NR Onset (ms) Norm Onset (ms) O-P Amp (mV) Norm O-P Amp Site1 Site2 Delta-0 (ms) Dist (cm) Mark (m/s) Norm Mark (m/s)   Left Median Motor (Abd Poll Brev)  30.5°C   Wrist    5.6 <4.2 5.4 >5 Elbow Wrist 5.6 24.0 43 >50   Elbow    11.2  4.7          Right Median Motor (Abd Poll Brev)  31°C   Wrist    5.5 <4.2 7.2 >5 Elbow Wrist 5.2 24.0 46 >50   Elbow    10.7  6.4          Left Ulnar Motor (Abd Dig Minimi)  31.4°C   Wrist    3.0 <4.2 9.6 >3 B Elbow Wrist 4.4 23.0 52 >53   B Elbow    7.4  7.1  A Elbow B Elbow 2.5 10.0 40 >53   A Elbow    9.9  6.8          Right Ulnar Motor (Abd Dig Minimi)  30.9°C   Wrist    3.0 <4.2 6.7 >3 B Elbow Wrist 4.2 23.0 55 >53   B Elbow    7.2  5.8  A Elbow B Elbow 1.8 10.0 56 >53   A Elbow    9.0  4.9            Comparison Summary Table     Stim Site NR Peak (ms) Norm Peak (ms) P-T Amp (µV) Site1 Site2 Delta-P (ms) Norm Delta (ms)   Left Median/Ulnar Palm Comparison (Wrist - 8cm)  31.2°C   Median Palm    5.5 <2.5 4.9 Median Palm Ulnar Palm 3.7 <0.3   Ulnar Palm    1.8 <2.5 9.0       Right Median/Ulnar Palm Comparison (Wrist - 8cm)  31.2°C   Median Palm    3.3 <2.5 10.2 Median Palm Ulnar Palm 1.1 <0.3   Ulnar Palm    2.2 <2.5 6.2         F Wave Studies     NR F-Lat (ms) Lat Norm (ms) L-R F-Lat (ms) L-R Lat Norm   Left Ulnar (Mrkrs) (Abd Dig Min)  31.5°C      33.72 <36 1.91 <2.5   Right Ulnar (Mrkrs) (Abd Dig Min)  31°C      35.63 <36 1.91 <2.5     EMG     Side Muscle Nerve Root Ins Act Fibs Psw Amp Dur Poly Recrt Int Pat Comment   Left Abd Poll Brev Median C8-T1 Nml Nml Nml Incr Nml 0 Nml Nml    Left 1stDorInt Ulnar C8-T1 Nml Nml Nml Nml Nml 0 Nml Nml    Left BrachioRad Radial C5-6 Nml Nml Nml Nml Nml 0 Nml Nml    Left PronatorTeres Median C6-7 Nml Nml Nml Nml Nml 0 Nml Nml    Left Triceps Radial C6-7-8 Nml Nml Nml Nml Nml 0 Nml Nml    Left Deltoid Axillary C5-6 Nml Nml Nml Nml Nml 0 Nml Nml    Right Abd Poll Brev Median C8-T1 Nml Nml Nml Nml Nml 0 Nml Nml    Right 1stDorInt Ulnar C8-T1 Nml Nml Nml Nml Nml 0 Nml Nml    Right BrachioRad Radial C5-6 Nml Nml Nml Nml Nml 0 Nml Nml    Right PronatorTeres Median C6-7 Nml Nml Nml Nml Nml 0 Nml Nml    Right Triceps Radial C6-7-8 Nml Nml Nml Nml Nml 0 Nml Nml    Right Deltoid Axillary C5-6 Nml Nml Nml Nml Nml 0 Nml Nml          Waveforms:

## 2019-10-28 ENCOUNTER — TELEPHONE (OUTPATIENT)
Dept: CARDIOLOGY CLINIC | Age: 74
End: 2019-10-28

## 2019-10-28 NOTE — TELEPHONE ENCOUNTER
Dr. Chiara Hardy office is requesting cardiac clearance for patient to undergo implant spinal stimulator placement. Clearance to include holding aspirin. Please advise. Fax # [phone].  AttnRolland Dun

## 2019-11-21 ENCOUNTER — OFFICE VISIT (OUTPATIENT)
Dept: CARDIOLOGY CLINIC | Age: 74
End: 2019-11-21

## 2019-11-21 VITALS
WEIGHT: 181.2 LBS | SYSTOLIC BLOOD PRESSURE: 118 MMHG | HEIGHT: 70 IN | OXYGEN SATURATION: 99 % | HEART RATE: 66 BPM | BODY MASS INDEX: 25.94 KG/M2 | DIASTOLIC BLOOD PRESSURE: 76 MMHG | RESPIRATION RATE: 16 BRPM

## 2019-11-21 DIAGNOSIS — I25.10 ATHEROSCLEROSIS OF NATIVE CORONARY ARTERY OF NATIVE HEART WITHOUT ANGINA PECTORIS: Primary | ICD-10-CM

## 2019-11-21 DIAGNOSIS — Z78.9 STATIN INTOLERANCE: ICD-10-CM

## 2019-11-21 DIAGNOSIS — R06.02 SOB (SHORTNESS OF BREATH): ICD-10-CM

## 2019-11-21 DIAGNOSIS — E78.2 MIXED HYPERLIPIDEMIA: ICD-10-CM

## 2019-11-21 NOTE — PROGRESS NOTES
HISTORY OF PRESENT ILLNESS  Donelda Hodgkins is a 75 y.o. male     SUMMARY:   Problem List  Date Reviewed: 11/20/2019          Codes Class Noted    Mixed hyperlipidemia ICD-10-CM: E78.2  ICD-9-CM: 272.2  Unknown        Coronary atherosclerosis ICD-10-CM: I25.10  ICD-9-CM: 414.00  11/19/2010    Overview Addendum 2/17/2011  3:26 PM by Kelly Law MD     Mr. Alivia Hunter presented in January 2007 with progressive angina. He underwent coronary arteriography, which showed significant multivessel coronary disease with overall preserved LV function. He then underwent bypass surgery with an internal mammary to the LAD, a vein graft to the circumflex marginal and a vein graft to the posterior descending. Cath 2/11 showed sig 3v disease, patent svg to lcx and lima to lad, occ svg to rca with left to right collaterals. Probable culprit diffusely diseased diagonal, less than 2mm. lvef 50%                   Current Outpatient Medications on File Prior to Visit   Medication Sig    tamsulosin (FLOMAX) 0.4 mg capsule Take 0.4 mg by mouth daily.  aspirin 81 mg tablet Take 81 mg by mouth daily.  diclofenac EC (VOLTAREN) 75 mg EC tablet Take 75 mg by mouth daily. No current facility-administered medications on file prior to visit. CARDIOLOGY STUDIES TO DATE:  2/9/11. Adenosine cardiolyte. Cardiac stress testing was pos for chest pain and myocardial ischemia. Myocardial perfusion imaging showed reversible deficits. of the ant lat wall Gated EF (%): 49    2/19 negative lexiscan cardiolyte  2/19 echo with lvef 50%, lae, mild mr and tr without pul htn    Chief Complaint   Patient presents with    Coronary Artery Disease     HPI :  Since we last met, Mr. Alivia Hunter did see a neurologist and it turns out he had bilateral carpal tunnel issues, which she got repaired with a fair amount of success and now has a nerve stimulator in his lumbar spine and he can walk, though he has not really started exercising much.   He still has some trouble sleeping, but overall things are much, much better than they were. He thinks his shortness of breath is about baseline. CARDIAC ROS:   negative for chest pain, palpitations, syncope, orthopnea, paroxysmal nocturnal dyspnea, exertional chest pressure/discomfort, claudication, lower extremity edema    No family history on file. Past Medical History:   Diagnosis Date    Coronary Atherosclerosis Coronary Unspecified 11/19/2010    Mixed hyperlipidemia        GENERAL ROS:  A comprehensive review of systems was negative except for that written in the HPI. Visit Vitals  /76 (BP 1 Location: Left arm, BP Patient Position: Sitting)   Pulse 66   Resp 16   Ht 5' 10\" (1.778 m)   Wt 181 lb 3.2 oz (82.2 kg)   SpO2 99%   BMI 26.00 kg/m²       Wt Readings from Last 3 Encounters:   11/21/19 181 lb 3.2 oz (82.2 kg)   07/08/19 179 lb (81.2 kg)   05/24/19 179 lb 9.6 oz (81.5 kg)            BP Readings from Last 3 Encounters:   11/21/19 118/76   07/08/19 112/71   05/24/19 100/70       PHYSICAL EXAM  General appearance: alert, cooperative, no distress, appears stated age  Neurologic: Alert and oriented X 3  Neck: supple, symmetrical, trachea midline, no adenopathy, no carotid bruit and no JVD  Lungs: clear to auscultation bilaterally  Abdomen: soft, non-tender.  Bowel sounds normal. No masses,  no organomegaly  Extremities: extremities normal, atraumatic, no cyanosis or edema    Lab Results   Component Value Date/Time    Cholesterol, total 184 02/01/2019 07:09 AM    Cholesterol, total 203 (H) 06/14/2018 08:32 AM    Cholesterol, total 160 06/19/2017 07:29 AM    Cholesterol, total 194 07/29/2016 07:33 AM    Cholesterol, total 209 (H) 06/03/2016 08:01 AM    HDL Cholesterol 65 02/01/2019 07:09 AM    HDL Cholesterol 67 06/14/2018 08:32 AM    HDL Cholesterol 78 06/19/2017 07:29 AM    HDL Cholesterol 84 07/29/2016 07:33 AM    HDL Cholesterol 70 06/03/2016 08:01 AM    LDL, calculated 104 (H) 02/01/2019 07:09 AM    LDL, calculated 122 (H) 06/14/2018 08:32 AM    LDL, calculated 72 06/19/2017 07:29 AM    LDL, calculated 97 07/29/2016 07:33 AM    LDL, calculated 126 (H) 06/03/2016 08:01 AM    Triglyceride 74 02/01/2019 07:09 AM    Triglyceride 69 06/14/2018 08:32 AM    Triglyceride 48 06/19/2017 07:29 AM    Triglyceride 67 07/29/2016 07:33 AM    Triglyceride 63 06/03/2016 08:01 AM     ASSESSMENT  Mr. Mary Anne Caldwell is stable and I think asymptomatic from a cardiac perspective. I am happy for him that he is doing so much better. He needs no cardiac testing at this time. current treatment plan is effective, no change in therapy  lab results and schedule of future lab studies reviewed with patient  reviewed diet, exercise and weight control    Encounter Diagnoses   Name Primary?  Atherosclerosis of native coronary artery of native heart without angina pectoris Yes    Mixed hyperlipidemia     SOB (shortness of breath)     Statin intolerance      No orders of the defined types were placed in this encounter. Follow-up and Dispositions    · Return in about 6 months (around 5/21/2020).          Leon Moise MD  11/21/2019

## 2020-05-07 ENCOUNTER — TELEPHONE (OUTPATIENT)
Dept: CARDIOLOGY CLINIC | Age: 75
End: 2020-05-07

## 2020-08-03 ENCOUNTER — OFFICE VISIT (OUTPATIENT)
Dept: CARDIOLOGY CLINIC | Age: 75
End: 2020-08-03
Payer: MEDICARE

## 2020-08-03 VITALS
HEART RATE: 66 BPM | BODY MASS INDEX: 25.34 KG/M2 | RESPIRATION RATE: 16 BRPM | DIASTOLIC BLOOD PRESSURE: 74 MMHG | WEIGHT: 177 LBS | HEIGHT: 70 IN | SYSTOLIC BLOOD PRESSURE: 126 MMHG

## 2020-08-03 DIAGNOSIS — Z78.9 STATIN INTOLERANCE: ICD-10-CM

## 2020-08-03 DIAGNOSIS — E78.2 MIXED HYPERLIPIDEMIA: ICD-10-CM

## 2020-08-03 DIAGNOSIS — I25.10 ATHEROSCLEROSIS OF CORONARY ARTERY OF NATIVE HEART WITHOUT ANGINA PECTORIS, UNSPECIFIED VESSEL OR LESION TYPE: Primary | ICD-10-CM

## 2020-08-03 PROCEDURE — 99213 OFFICE O/P EST LOW 20 MIN: CPT | Performed by: SPECIALIST

## 2020-08-03 PROCEDURE — 1101F PT FALLS ASSESS-DOCD LE1/YR: CPT | Performed by: SPECIALIST

## 2020-08-03 PROCEDURE — G8432 DEP SCR NOT DOC, RNG: HCPCS | Performed by: SPECIALIST

## 2020-08-03 PROCEDURE — G8419 CALC BMI OUT NRM PARAM NOF/U: HCPCS | Performed by: SPECIALIST

## 2020-08-03 PROCEDURE — G8427 DOCREV CUR MEDS BY ELIG CLIN: HCPCS | Performed by: SPECIALIST

## 2020-08-03 PROCEDURE — G8536 NO DOC ELDER MAL SCRN: HCPCS | Performed by: SPECIALIST

## 2020-08-03 PROCEDURE — 3017F COLORECTAL CA SCREEN DOC REV: CPT | Performed by: SPECIALIST

## 2020-08-03 NOTE — PROGRESS NOTES
HISTORY OF PRESENT ILLNESS  Kami Winkler is a 75 y.o. male     SUMMARY:   Problem List  Date Reviewed: 8/3/2020          Codes Class Noted    Mixed hyperlipidemia ICD-10-CM: E78.2  ICD-9-CM: 272.2  Unknown        Coronary atherosclerosis ICD-10-CM: I25.10  ICD-9-CM: 414.00  11/19/2010    Overview Addendum 2/17/2011  3:26 PM by Navya Camejo MD     Mr. Hermes Smith presented in January 2007 with progressive angina. He underwent coronary arteriography, which showed significant multivessel coronary disease with overall preserved LV function. He then underwent bypass surgery with an internal mammary to the LAD, a vein graft to the circumflex marginal and a vein graft to the posterior descending. Cath 2/11 showed sig 3v disease, patent svg to lcx and lima to lad, occ svg to rca with left to right collaterals. Probable culprit diffusely diseased diagonal, less than 2mm. lvef 50%                   Current Outpatient Medications on File Prior to Visit   Medication Sig    tamsulosin (FLOMAX) 0.4 mg capsule Take 0.4 mg by mouth daily.  aspirin 81 mg tablet Take 81 mg by mouth daily.  diclofenac EC (VOLTAREN) 75 mg EC tablet Take 75 mg by mouth daily. No current facility-administered medications on file prior to visit. CARDIOLOGY STUDIES TO DATE:  2/9/11. Adenosine cardiolyte. Cardiac stress testing was pos for chest pain and myocardial ischemia. Myocardial perfusion imaging showed reversible deficits. of the ant lat wall Gated EF (%): 49    2/19 negative lexiscan cardiolyte  2/19 echo with lvef 50%, lae, mild mr and tr without pul htn    Chief Complaint   Patient presents with    Follow-up     HPI :  Mr. Wilton Eastman doing okay but he has lots of issues. His balance is really bad. He is having some mild orthostatic symptoms it turns out he does not drink enough water and he is on Flomax. He still has shortness of breath and great difficulty sleeping.   He is having some pain in his groin which sounds like it may be referred from either his hip or his back. Continues to complain of generalized fatigue. CARDIAC ROS:   negative for chest pain, palpitations, syncope, orthopnea, paroxysmal nocturnal dyspnea, exertional chest pressure/discomfort, claudication, lower extremity edema    No family history on file. Past Medical History:   Diagnosis Date    Coronary Atherosclerosis Coronary Unspecified 11/19/2010    Mixed hyperlipidemia        GENERAL ROS:  A comprehensive review of systems was negative except for that written in the HPI.     Visit Vitals  /74 (BP 1 Location: Left arm, BP Patient Position: Sitting)   Pulse 66   Resp 16   Ht 5' 10\" (1.778 m)   Wt 177 lb (80.3 kg)   BMI 25.40 kg/m²       Wt Readings from Last 3 Encounters:   08/03/20 177 lb (80.3 kg)   11/21/19 181 lb 3.2 oz (82.2 kg)   07/08/19 179 lb (81.2 kg)            BP Readings from Last 3 Encounters:   08/03/20 126/74   11/21/19 118/76   07/08/19 112/71       PHYSICAL EXAM  General appearance: alert, cooperative, no distress, appears stated age  Neurologic: Alert and oriented X 3  Neck: supple, symmetrical, trachea midline, no adenopathy, no carotid bruit and no JVD  Lungs: clear to auscultation bilaterally  Heart: regular rate and rhythm, S1, S2 normal, no murmur, click, rub or gallop  Extremities: extremities normal, atraumatic, no cyanosis or edema    Lab Results   Component Value Date/Time    Cholesterol, total 184 02/01/2019 07:09 AM    Cholesterol, total 203 (H) 06/14/2018 08:32 AM    Cholesterol, total 160 06/19/2017 07:29 AM    Cholesterol, total 194 07/29/2016 07:33 AM    Cholesterol, total 209 (H) 06/03/2016 08:01 AM    HDL Cholesterol 65 02/01/2019 07:09 AM    HDL Cholesterol 67 06/14/2018 08:32 AM    HDL Cholesterol 78 06/19/2017 07:29 AM    HDL Cholesterol 84 07/29/2016 07:33 AM    HDL Cholesterol 70 06/03/2016 08:01 AM    LDL, calculated 104 (H) 02/01/2019 07:09 AM    LDL, calculated 122 (H) 06/14/2018 08:32 AM    LDL, calculated 72 06/19/2017 07:29 AM    LDL, calculated 97 07/29/2016 07:33 AM    LDL, calculated 126 (H) 06/03/2016 08:01 AM    Triglyceride 74 02/01/2019 07:09 AM    Triglyceride 69 06/14/2018 08:32 AM    Triglyceride 48 06/19/2017 07:29 AM    Triglyceride 67 07/29/2016 07:33 AM    Triglyceride 63 06/03/2016 08:01 AM     ASSESSMENT :      It seems to me the most of his symptoms are neurologic or perhaps a combination of neurologic and some anxiety or depression and we talked about all that at length. He needs to drink more water and be aware that he may be lightheaded when he gets up quickly. None of the symptoms he records are new with respect to the shortness of breath and nothing else it sounds like angina so I do not think we need to work that up again like we did about a year and a half ago. I also spoke to his wife and try to reassure her as well. We gave him the contact number to have his [facility] neurologist and he will make an appointment to see them. current treatment plan is effective, no change in therapy  lab results and schedule of future lab studies reviewed with patient  reviewed diet, exercise and weight control    Encounter Diagnoses   Name Primary?  Atherosclerosis of coronary artery of native heart without angina pectoris, unspecified vessel or lesion type Yes    Mixed hyperlipidemia     Statin intolerance      No orders of the defined types were placed in this encounter. Follow-up and Dispositions    · Return in about 6 months (around 2/3/2021). Keegan Travis MD  8/3/2020  Please note that this dictation was completed with "[employer] the [employer] voice recognition software. Quite often unanticipated grammatical, syntax, homophones, and other interpretive errors are inadvertently transcribed by the computer software. Please disregard these errors. Please excuse any errors that have escaped final proofreading. Thank you.

## 2021-01-07 ENCOUNTER — TRANSCRIBE ORDER (OUTPATIENT)
Dept: SCHEDULING | Age: 76
End: 2021-01-07

## 2021-01-07 DIAGNOSIS — M48.061 NEURAL FORAMINAL STENOSIS OF LUMBAR SPINE: Primary | ICD-10-CM

## 2021-01-07 DIAGNOSIS — M48.061 LUMBAR SPINAL STENOSIS: ICD-10-CM

## 2021-01-07 DIAGNOSIS — M41.50 DEGENERATIVE SCOLIOSIS IN ADULT PATIENT: ICD-10-CM

## 2021-01-07 DIAGNOSIS — M51.36 DISC DEGENERATION, LUMBAR: ICD-10-CM

## 2021-02-01 ENCOUNTER — HOSPITAL ENCOUNTER (OUTPATIENT)
Dept: CT IMAGING | Age: 76
Discharge: HOME | End: 2021-02-01
Attending: PHYSICIAN ASSISTANT
Payer: MEDICARE

## 2021-02-01 ENCOUNTER — TRANSCRIBE ORDER (OUTPATIENT)
Dept: SCHEDULING | Age: 76
End: 2021-02-01

## 2021-02-01 ENCOUNTER — HOSPITAL ENCOUNTER (OUTPATIENT)
Dept: GENERAL RADIOLOGY | Age: 76
End: 2021-02-01
Attending: PHYSICIAN ASSISTANT
Payer: MEDICARE

## 2021-02-01 DIAGNOSIS — M51.36 DEGENERATION OF LUMBAR INTERVERTEBRAL DISC: ICD-10-CM

## 2021-02-01 DIAGNOSIS — M48.061 NEURAL FORAMINAL STENOSIS OF LUMBAR SPINE: Primary | ICD-10-CM

## 2021-02-01 DIAGNOSIS — M51.36 DISC DEGENERATION, LUMBAR: ICD-10-CM

## 2021-02-01 DIAGNOSIS — M48.061 NEURAL FORAMINAL STENOSIS OF LUMBAR SPINE: ICD-10-CM

## 2021-02-01 DIAGNOSIS — M48.061 LUMBAR SPINAL STENOSIS: ICD-10-CM

## 2021-02-01 DIAGNOSIS — M41.50 DEGENERATIVE SCOLIOSIS IN ADULT PATIENT: ICD-10-CM

## 2021-02-01 PROCEDURE — 72131 CT LUMBAR SPINE W/O DYE: CPT

## 2021-02-10 ENCOUNTER — OFFICE VISIT (OUTPATIENT)
Dept: CARDIOLOGY CLINIC | Age: 76
End: 2021-02-10
Payer: MEDICARE

## 2021-02-10 VITALS
BODY MASS INDEX: 25.48 KG/M2 | OXYGEN SATURATION: 98 % | WEIGHT: 182 LBS | HEART RATE: 69 BPM | SYSTOLIC BLOOD PRESSURE: 128 MMHG | DIASTOLIC BLOOD PRESSURE: 68 MMHG | HEIGHT: 71 IN | RESPIRATION RATE: 16 BRPM

## 2021-02-10 DIAGNOSIS — E78.2 MIXED HYPERLIPIDEMIA: ICD-10-CM

## 2021-02-10 DIAGNOSIS — I25.10 ATHEROSCLEROSIS OF NATIVE CORONARY ARTERY OF NATIVE HEART WITHOUT ANGINA PECTORIS: Primary | ICD-10-CM

## 2021-02-10 DIAGNOSIS — R06.02 SOB (SHORTNESS OF BREATH): ICD-10-CM

## 2021-02-10 DIAGNOSIS — Z78.9 STATIN INTOLERANCE: ICD-10-CM

## 2021-02-10 PROCEDURE — G8536 NO DOC ELDER MAL SCRN: HCPCS | Performed by: SPECIALIST

## 2021-02-10 PROCEDURE — 99213 OFFICE O/P EST LOW 20 MIN: CPT | Performed by: SPECIALIST

## 2021-02-10 PROCEDURE — 1101F PT FALLS ASSESS-DOCD LE1/YR: CPT | Performed by: SPECIALIST

## 2021-02-10 PROCEDURE — G8427 DOCREV CUR MEDS BY ELIG CLIN: HCPCS | Performed by: SPECIALIST

## 2021-02-10 PROCEDURE — G8432 DEP SCR NOT DOC, RNG: HCPCS | Performed by: SPECIALIST

## 2021-02-10 PROCEDURE — G8419 CALC BMI OUT NRM PARAM NOF/U: HCPCS | Performed by: SPECIALIST

## 2021-02-10 PROCEDURE — G0463 HOSPITAL OUTPT CLINIC VISIT: HCPCS | Performed by: SPECIALIST

## 2021-02-10 NOTE — PROGRESS NOTES
HISTORY OF PRESENT ILLNESS  Keishavipin Ladd is a 67 y.o. male     SUMMARY:   Problem List  Date Reviewed: 2/10/2021          Codes Class Noted    Mixed hyperlipidemia ICD-10-CM: E78.2  ICD-9-CM: 272.2  Unknown        Coronary atherosclerosis ICD-10-CM: I25.10  ICD-9-CM: 414.00  11/19/2010    Overview Addendum 2/17/2011  3:26 PM by Saintclair Crocker, MD     Mr. Marlys Gonzalez presented in January 2007 with progressive angina. He underwent coronary arteriography, which showed significant multivessel coronary disease with overall preserved LV function. He then underwent bypass surgery with an internal mammary to the LAD, a vein graft to the circumflex marginal and a vein graft to the posterior descending. Cath 2/11 showed sig 3v disease, patent svg to lcx and lima to lad, occ svg to rca with left to right collaterals. Probable culprit diffusely diseased diagonal, less than 2mm. lvef 50%                   Current Outpatient Medications on File Prior to Visit   Medication Sig    tamsulosin (FLOMAX) 0.4 mg capsule Take 0.4 mg by mouth daily.  aspirin 81 mg tablet Take 81 mg by mouth daily.  diclofenac EC (VOLTAREN) 75 mg EC tablet Take 75 mg by mouth daily. No current facility-administered medications on file prior to visit. CARDIOLOGY STUDIES TO DATE:  2/9/11. Adenosine cardiolyte. Cardiac stress testing was pos for chest pain and myocardial ischemia. Myocardial perfusion imaging showed reversible deficits. of the ant lat wall Gated EF (%): 49    2/19 negative lexiscan cardiolyte  2/19 echo with lvef 50%, lae, mild mr and tr without pul htn    Chief Complaint   Patient presents with    Follow-up     HPI :  He is doing well from a cardiac perspective with no complaints only mild shortness of breath with activity which is an old problem. He is got some sort of nerve stimulator back now which is can allow him to exercise some. His real problem is sleep difficulty.   He has trouble getting up frequently to urinate at night and then cannot get back to sleep so he is tired all day. He is statin intolerant  CARDIAC ROS:   negative for chest pain, palpitations, syncope, orthopnea, paroxysmal nocturnal dyspnea, exertional chest pressure/discomfort, claudication, lower extremity edema    No family history on file. Past Medical History:   Diagnosis Date    Coronary Atherosclerosis Coronary Unspecified 11/19/2010    Mixed hyperlipidemia        GENERAL ROS:  A comprehensive review of systems was negative except for that written in the HPI.     Visit Vitals  /68 (BP 1 Location: Left upper arm, BP Patient Position: Sitting)   Pulse 69   Resp 16   Ht 5' 11\" (1.803 m)   Wt 182 lb (82.6 kg)   SpO2 98%   BMI 25.38 kg/m²       Wt Readings from Last 3 Encounters:   02/10/21 182 lb (82.6 kg)   08/03/20 177 lb (80.3 kg)   11/21/19 181 lb 3.2 oz (82.2 kg)            BP Readings from Last 3 Encounters:   02/10/21 128/68   08/03/20 126/74   11/21/19 118/76       PHYSICAL EXAM  General appearance: alert, cooperative, no distress, appears stated age  Neurologic: Alert and oriented X 3  Neck: supple, symmetrical, trachea midline, no adenopathy, no carotid bruit and no JVD  Lungs: clear to auscultation bilaterally  Heart: regular rate and rhythm, S1, S2 normal, no murmur, click, rub or gallop  Extremities: extremities normal, atraumatic, no cyanosis or edema    Lab Results   Component Value Date/Time    Cholesterol, total 184 02/01/2019 07:09 AM    Cholesterol, total 203 (H) 06/14/2018 08:32 AM    Cholesterol, total 160 06/19/2017 07:29 AM    Cholesterol, total 194 07/29/2016 07:33 AM    Cholesterol, total 209 (H) 06/03/2016 08:01 AM    HDL Cholesterol 65 02/01/2019 07:09 AM    HDL Cholesterol 67 06/14/2018 08:32 AM    HDL Cholesterol 78 06/19/2017 07:29 AM    HDL Cholesterol 84 07/29/2016 07:33 AM    HDL Cholesterol 70 06/03/2016 08:01 AM    LDL, calculated 104 (H) 02/01/2019 07:09 AM    LDL, calculated 122 (H) 06/14/2018 08:32 AM LDL, calculated 72 06/19/2017 07:29 AM    LDL, calculated 97 07/29/2016 07:33 AM    LDL, calculated 126 (H) 06/03/2016 08:01 AM    Triglyceride 74 02/01/2019 07:09 AM    Triglyceride 69 06/14/2018 08:32 AM    Triglyceride 48 06/19/2017 07:29 AM    Triglyceride 67 07/29/2016 07:33 AM    Triglyceride 63 06/03/2016 08:01 AM     ASSESSMENT :      He is stable and asymptomatic, well compensated on a reasonable medical regimen and needs no cardiac testing at this time. current treatment plan is effective, no change in therapy  lab results and schedule of future lab studies reviewed with patient  reviewed diet, exercise and weight control    Encounter Diagnoses   Name Primary?  Atherosclerosis of native coronary artery of native heart without angina pectoris Yes    Mixed hyperlipidemia     SOB (shortness of breath)     Statin intolerance      No orders of the defined types were placed in this encounter. Follow-up and Dispositions    · Return in about 6 months (around 8/10/2021). Mickey Buchanan MD  2/10/2021  Please note that this dictation was completed with [employer] the [employer] voice recognition software. Quite often unanticipated grammatical, syntax, homophones, and other interpretive errors are inadvertently transcribed by the computer software. Please disregard these errors. Please excuse any errors that have escaped final proofreading. Thank you.

## 2021-09-13 ENCOUNTER — OFFICE VISIT (OUTPATIENT)
Dept: CARDIOLOGY CLINIC | Age: 76
End: 2021-09-13
Payer: MEDICARE

## 2021-09-13 VITALS
HEIGHT: 71 IN | RESPIRATION RATE: 14 BRPM | BODY MASS INDEX: 25.17 KG/M2 | OXYGEN SATURATION: 97 % | HEART RATE: 64 BPM | DIASTOLIC BLOOD PRESSURE: 76 MMHG | SYSTOLIC BLOOD PRESSURE: 120 MMHG | WEIGHT: 179.8 LBS

## 2021-09-13 DIAGNOSIS — R06.02 SOB (SHORTNESS OF BREATH): ICD-10-CM

## 2021-09-13 DIAGNOSIS — Z78.9 STATIN INTOLERANCE: ICD-10-CM

## 2021-09-13 DIAGNOSIS — I25.10 ATHEROSCLEROSIS OF NATIVE CORONARY ARTERY OF NATIVE HEART WITHOUT ANGINA PECTORIS: Primary | ICD-10-CM

## 2021-09-13 DIAGNOSIS — E78.2 MIXED HYPERLIPIDEMIA: ICD-10-CM

## 2021-09-13 PROCEDURE — G0463 HOSPITAL OUTPT CLINIC VISIT: HCPCS | Performed by: SPECIALIST

## 2021-09-13 PROCEDURE — G8536 NO DOC ELDER MAL SCRN: HCPCS | Performed by: SPECIALIST

## 2021-09-13 PROCEDURE — 93010 ELECTROCARDIOGRAM REPORT: CPT | Performed by: SPECIALIST

## 2021-09-13 PROCEDURE — G8427 DOCREV CUR MEDS BY ELIG CLIN: HCPCS | Performed by: SPECIALIST

## 2021-09-13 PROCEDURE — 99213 OFFICE O/P EST LOW 20 MIN: CPT | Performed by: SPECIALIST

## 2021-09-13 PROCEDURE — 1101F PT FALLS ASSESS-DOCD LE1/YR: CPT | Performed by: SPECIALIST

## 2021-09-13 PROCEDURE — G8419 CALC BMI OUT NRM PARAM NOF/U: HCPCS | Performed by: SPECIALIST

## 2021-09-13 PROCEDURE — G8432 DEP SCR NOT DOC, RNG: HCPCS | Performed by: SPECIALIST

## 2021-09-13 PROCEDURE — 93005 ELECTROCARDIOGRAM TRACING: CPT | Performed by: SPECIALIST

## 2021-09-13 NOTE — PROGRESS NOTES
HISTORY OF PRESENT ILLNESS  Gordo Okeefe is a 67 y.o. male     SUMMARY:   Problem List  Date Reviewed: 9/13/2021        Codes Class Noted    Mixed hyperlipidemia ICD-10-CM: E78.2  ICD-9-CM: 272.2  Unknown        Coronary atherosclerosis ICD-10-CM: I25.10  ICD-9-CM: 414.00  11/19/2010    Overview Addendum 2/17/2011  3:26 PM by Madelyn Dumont MD     Mr. Janice Giraldo presented in January 2007 with progressive angina. He underwent coronary arteriography, which showed significant multivessel coronary disease with overall preserved LV function. He then underwent bypass surgery with an internal mammary to the LAD, a vein graft to the circumflex marginal and a vein graft to the posterior descending. Cath 2/11 showed sig 3v disease, patent svg to lcx and lima to lad, occ svg to rca with left to right collaterals. Probable culprit diffusely diseased diagonal, less than 2mm. lvef 50%                   Current Outpatient Medications on File Prior to Visit   Medication Sig    tamsulosin (FLOMAX) 0.4 mg capsule Take 0.4 mg by mouth daily.  aspirin 81 mg tablet Take 81 mg by mouth daily.  diclofenac EC (VOLTAREN) 75 mg EC tablet Take 75 mg by mouth daily. No current facility-administered medications on file prior to visit. CARDIOLOGY STUDIES TO DATE:  2/9/11. Adenosine cardiolyte. Cardiac stress testing was pos for chest pain and myocardial ischemia. Myocardial perfusion imaging showed reversible deficits. of the ant lat wall Gated EF (%): 49    2/19 negative lexiscan cardiolyte  2/19 echo with lvef 50%, lae, mild mr and tr without pul htn    Chief Complaint   Patient presents with    Follow-up     HPI :  He is still having lots of problems with his back. He cannot sit for too long and even if he lays down it helps temporarily but then it starts hurting again so he sleeps poorly at night and is tired all day long. He is not doing any regular exercise. Blood pressures well controlled.   He is statin intolerant. CARDIAC ROS:   negative for chest pain, dyspnea, palpitations, syncope, orthopnea, paroxysmal nocturnal dyspnea, exertional chest pressure/discomfort, claudication, lower extremity edema    No family history on file. Past Medical History:   Diagnosis Date    Coronary Atherosclerosis Coronary Unspecified 11/19/2010    Mixed hyperlipidemia        GENERAL ROS:  A comprehensive review of systems was negative except for that written in the HPI.     Visit Vitals  /76 (BP 1 Location: Right arm, BP Patient Position: Sitting, BP Cuff Size: Adult)   Pulse 64   Resp 14   Ht 5' 11\" (1.803 m)   Wt 179 lb 12.8 oz (81.6 kg)   SpO2 97%   BMI 25.08 kg/m²       Wt Readings from Last 3 Encounters:   09/13/21 179 lb 12.8 oz (81.6 kg)   02/10/21 182 lb (82.6 kg)   08/03/20 177 lb (80.3 kg)            BP Readings from Last 3 Encounters:   09/13/21 120/76   02/10/21 128/68   08/03/20 126/74       PHYSICAL EXAM  General appearance: alert, cooperative, no distress, appears stated age  Neurologic: Alert and oriented X 3  Neck: supple, symmetrical, trachea midline, no adenopathy, no carotid bruit and no JVD  Lungs: clear to auscultation bilaterally  Heart: regular rate and rhythm, S1, S2 normal, no murmur, click, rub or gallop  Extremities: extremities normal, atraumatic, no cyanosis or edema    Lab Results   Component Value Date/Time    Cholesterol, total 184 02/01/2019 07:09 AM    Cholesterol, total 203 (H) 06/14/2018 08:32 AM    Cholesterol, total 160 06/19/2017 07:29 AM    Cholesterol, total 194 07/29/2016 07:33 AM    Cholesterol, total 209 (H) 06/03/2016 08:01 AM    HDL Cholesterol 65 02/01/2019 07:09 AM    HDL Cholesterol 67 06/14/2018 08:32 AM    HDL Cholesterol 78 06/19/2017 07:29 AM    HDL Cholesterol 84 07/29/2016 07:33 AM    HDL Cholesterol 70 06/03/2016 08:01 AM    LDL, calculated 104 (H) 02/01/2019 07:09 AM    LDL, calculated 122 (H) 06/14/2018 08:32 AM    LDL, calculated 72 06/19/2017 07:29 AM    LDL, calculated 97 07/29/2016 07:33 AM    LDL, calculated 126 (H) 06/03/2016 08:01 AM    Triglyceride 74 02/01/2019 07:09 AM    Triglyceride 69 06/14/2018 08:32 AM    Triglyceride 48 06/19/2017 07:29 AM    Triglyceride 67 07/29/2016 07:33 AM    Triglyceride 63 06/03/2016 08:01 AM     ASSESSMENT :      He is stable and asymptomatic, well compensated on a good medical regimen and needs no cardiac testing at this time. current treatment plan is effective, no change in therapy  lab results and schedule of future lab studies reviewed with patient  reviewed diet, exercise and weight control    Encounter Diagnoses   Name Primary?  Atherosclerosis of native coronary artery of native heart without angina pectoris Yes    Mixed hyperlipidemia     Statin intolerance     SOB (shortness of breath)      No orders of the defined types were placed in this encounter. Follow-up and Dispositions    · Return in about 6 months (around 3/13/2022). Blas Mcgregor MD  9/13/2021  Please note that this dictation was completed with [employer], the computer voice recognition software. Quite often unanticipated grammatical, syntax, homophones, and other interpretive errors are inadvertently transcribed by the computer software. Please disregard these errors. Please excuse any errors that have escaped final proofreading. Thank you.

## 2021-12-30 ENCOUNTER — TELEPHONE (OUTPATIENT)
Dept: CARDIOLOGY CLINIC | Age: 76
End: 2021-12-30

## 2021-12-30 NOTE — TELEPHONE ENCOUNTER
[id number] [address] for procedure without special precautions or further cardiac testing.  Hold aspirin for 7 days

## 2021-12-30 NOTE — TELEPHONE ENCOUNTER
[facility] is requesting cardiac clearance for patient to undergo right ureteroscopy with holmium laser and stent placement under general anesthesia. Clearance to include holding aspirin. If okay, how many days can he hold?     Fax #: [phone]

## 2022-07-28 ENCOUNTER — TELEPHONE (OUTPATIENT)
Dept: CARDIOLOGY CLINIC | Age: 77
End: 2022-07-28

## 2022-07-28 NOTE — TELEPHONE ENCOUNTER
Please advise if okay for surgery. Clearance likely to include holding aspirin. If okay, how many days can he hold?     Alternate fax # [phone]

## 2022-07-28 NOTE — TELEPHONE ENCOUNTER
Calling to request a cardiac clearance, patient is scheduled for replacement of spinal stimulator on 08/04/2022 faxed to [phone]

## 2023-11-16 ENCOUNTER — OFFICE VISIT (OUTPATIENT)
Age: 78
End: 2023-11-16
Payer: MEDICARE

## 2023-11-16 VITALS
DIASTOLIC BLOOD PRESSURE: 62 MMHG | SYSTOLIC BLOOD PRESSURE: 104 MMHG | HEIGHT: 71 IN | OXYGEN SATURATION: 95 % | BODY MASS INDEX: 26.63 KG/M2 | WEIGHT: 190.2 LBS | HEART RATE: 92 BPM

## 2023-11-16 DIAGNOSIS — I25.10 ATHEROSCLEROSIS OF NATIVE CORONARY ARTERY OF NATIVE HEART WITHOUT ANGINA PECTORIS: Primary | ICD-10-CM

## 2023-11-16 DIAGNOSIS — E78.2 MIXED HYPERLIPIDEMIA: ICD-10-CM

## 2023-11-16 DIAGNOSIS — I35.0 NONRHEUMATIC AORTIC VALVE STENOSIS: ICD-10-CM

## 2023-11-16 DIAGNOSIS — I25.5 ISCHEMIC CARDIOMYOPATHY: ICD-10-CM

## 2023-11-16 PROCEDURE — 1036F TOBACCO NON-USER: CPT | Performed by: SPECIALIST

## 2023-11-16 PROCEDURE — G8484 FLU IMMUNIZE NO ADMIN: HCPCS | Performed by: SPECIALIST

## 2023-11-16 PROCEDURE — 1123F ACP DISCUSS/DSCN MKR DOCD: CPT | Performed by: SPECIALIST

## 2023-11-16 PROCEDURE — 99214 OFFICE O/P EST MOD 30 MIN: CPT | Performed by: SPECIALIST

## 2023-11-16 PROCEDURE — G8427 DOCREV CUR MEDS BY ELIG CLIN: HCPCS | Performed by: SPECIALIST

## 2023-11-16 PROCEDURE — G8419 CALC BMI OUT NRM PARAM NOF/U: HCPCS | Performed by: SPECIALIST

## 2023-11-16 RX ORDER — ROSUVASTATIN CALCIUM 5 MG/1
5 TABLET, COATED ORAL DAILY
COMMUNITY
Start: 2023-11-10

## 2023-11-16 RX ORDER — CHLORAL HYDRATE 500 MG
CAPSULE ORAL DAILY
COMMUNITY

## 2023-11-16 RX ORDER — DULOXETIN HYDROCHLORIDE 30 MG/1
CAPSULE, DELAYED RELEASE ORAL DAILY
COMMUNITY
Start: 2023-10-23

## 2023-11-16 RX ORDER — ASPIRIN 81 MG/1
81 TABLET ORAL DAILY
COMMUNITY

## 2023-11-16 RX ORDER — DONEPEZIL HYDROCHLORIDE 10 MG/1
10 TABLET, FILM COATED ORAL NIGHTLY
COMMUNITY

## 2023-11-16 ASSESSMENT — PATIENT HEALTH QUESTIONNAIRE - PHQ9
2. FEELING DOWN, DEPRESSED OR HOPELESS: 0
SUM OF ALL RESPONSES TO PHQ QUESTIONS 1-9: 0
SUM OF ALL RESPONSES TO PHQ QUESTIONS 1-9: 0
SUM OF ALL RESPONSES TO PHQ9 QUESTIONS 1 & 2: 0
SUM OF ALL RESPONSES TO PHQ QUESTIONS 1-9: 0
1. LITTLE INTEREST OR PLEASURE IN DOING THINGS: 0
SUM OF ALL RESPONSES TO PHQ QUESTIONS 1-9: 0

## 2023-11-16 NOTE — PROGRESS NOTES
HISTORY OF PRESENT ILLNESS  Zaid Sotelo is a 77 y.o. male     SUMMARY:   Patient Active Problem List   Diagnosis    Coronary atherosclerosis    Mixed hyperlipidemia            CARDIOLOGY STUDIES TO DATE:  2/9/11. Adenosine cardiolyte. Cardiac stress testing was pos for chest pain and myocardial ischemia. Myocardial perfusion imaging showed reversible deficits. of the ant lat wall Gated EF (%): 49     2/19 negative lexiscan cardiolyte  2/19 echo with lvef 50%, lae, mild mr and tr without pul htn    11/23 [place], cardiolyte stress test with fixed inferior defect, lvef 36%  11/23 [place], echo lve, lvef 35-40%,,rve with mildly reduced rvef,carlitos, mean av grad 21 with mild regurg,mild mr      Chief Complaint   Patient presents with    Coronary Artery Disease       HPI :  He returns have not been seen for little over 2 years. His wife seems more concerned about his issues and he does. She thinks he is fatigued all the time sleeps too much and is short of breath but he feels like he can do what he wants to do without any limiting symptoms though he does not get a lot of exercise. His back pain issues are relatively quiescent at this point. Sometime since we last met he is must have developed some dementia and depression and is now on Cymbalta and Aricept and his wife reports that his recall is terrible. Because of all this they saw her primary care and he ordered testing which I reviewed and summarized above. Quite a dramatic change from what we saw when we tested things about 5 years ago. He is now on Crestor as well which she had been resistant to in the past.  Blood pressure tends to run on the low side but he is asymptomatic in that respect    CARDIAC ROS:   negative for chest pain, irregular heart beat, lower extremity edema, orthopnea, paroxysmal nocturnal dyspnea, and syncope    No family history on file.     Past Medical History:   Diagnosis Date    Coronary atherosclerosis of unspecified

## 2023-12-06 ENCOUNTER — OFFICE VISIT (OUTPATIENT)
Age: 78
End: 2023-12-06
Payer: MEDICARE

## 2023-12-06 ENCOUNTER — PREP FOR PROCEDURE (OUTPATIENT)
Age: 78
End: 2023-12-06

## 2023-12-06 ENCOUNTER — TELEPHONE (OUTPATIENT)
Age: 78
End: 2023-12-06

## 2023-12-06 VITALS
BODY MASS INDEX: 26.71 KG/M2 | HEIGHT: 71 IN | DIASTOLIC BLOOD PRESSURE: 82 MMHG | WEIGHT: 190.8 LBS | HEART RATE: 67 BPM | OXYGEN SATURATION: 97 % | SYSTOLIC BLOOD PRESSURE: 128 MMHG

## 2023-12-06 DIAGNOSIS — I35.0 AORTIC STENOSIS: Primary | ICD-10-CM

## 2023-12-06 DIAGNOSIS — I42.9 CARDIOMYOPATHY (HCC): ICD-10-CM

## 2023-12-06 DIAGNOSIS — I35.0 AORTIC STENOSIS: ICD-10-CM

## 2023-12-06 DIAGNOSIS — Z01.812 PRE-PROCEDURE LAB EXAM: ICD-10-CM

## 2023-12-06 DIAGNOSIS — I25.10 ATHEROSCLEROSIS OF NATIVE CORONARY ARTERY OF NATIVE HEART WITHOUT ANGINA PECTORIS: ICD-10-CM

## 2023-12-06 DIAGNOSIS — E78.2 MIXED HYPERLIPIDEMIA: ICD-10-CM

## 2023-12-06 DIAGNOSIS — I35.0 AORTIC VALVE STENOSIS, ETIOLOGY OF CARDIAC VALVE DISEASE UNSPECIFIED: Primary | ICD-10-CM

## 2023-12-06 DIAGNOSIS — I42.9 CARDIOMYOPATHY, UNSPECIFIED TYPE (HCC): ICD-10-CM

## 2023-12-06 DIAGNOSIS — I35.0 NONRHEUMATIC AORTIC VALVE STENOSIS: Primary | ICD-10-CM

## 2023-12-06 PROCEDURE — 99204 OFFICE O/P NEW MOD 45 MIN: CPT | Performed by: INTERNAL MEDICINE

## 2023-12-06 PROCEDURE — G8427 DOCREV CUR MEDS BY ELIG CLIN: HCPCS | Performed by: INTERNAL MEDICINE

## 2023-12-06 PROCEDURE — 93010 ELECTROCARDIOGRAM REPORT: CPT | Performed by: INTERNAL MEDICINE

## 2023-12-06 PROCEDURE — 1036F TOBACCO NON-USER: CPT | Performed by: INTERNAL MEDICINE

## 2023-12-06 PROCEDURE — G8419 CALC BMI OUT NRM PARAM NOF/U: HCPCS | Performed by: INTERNAL MEDICINE

## 2023-12-06 PROCEDURE — G8484 FLU IMMUNIZE NO ADMIN: HCPCS | Performed by: INTERNAL MEDICINE

## 2023-12-06 PROCEDURE — 93005 ELECTROCARDIOGRAM TRACING: CPT | Performed by: INTERNAL MEDICINE

## 2023-12-06 PROCEDURE — 1123F ACP DISCUSS/DSCN MKR DOCD: CPT | Performed by: INTERNAL MEDICINE

## 2023-12-06 ASSESSMENT — PATIENT HEALTH QUESTIONNAIRE - PHQ9
1. LITTLE INTEREST OR PLEASURE IN DOING THINGS: 0
SUM OF ALL RESPONSES TO PHQ QUESTIONS 1-9: 0
2. FEELING DOWN, DEPRESSED OR HOPELESS: 0
SUM OF ALL RESPONSES TO PHQ9 QUESTIONS 1 & 2: 0
SUM OF ALL RESPONSES TO PHQ QUESTIONS 1-9: 0

## 2023-12-06 NOTE — TELEPHONE ENCOUNTER
soap and water. The site may be bruised or discolored for a couple of weeks; a small knot may also be present. You may have tenderness/soreness in groin or wrist area, you may take Tylenol for relief. When to call the office  You notice any tingling, numbness, coldness, or loss of feeling, or you have a fever for 2-3 days after the procedure, if there is visible blood at the site, hold pressure for 20 minutes and then call. [address] [place] [address]                                                        Cath lab: [id number] [facility] office: [phone]                                   ----- Message from Rashid Lu RN sent at 12/6/2023  9:08 AM EST -----  Regarding: [facility]  Please schedule C ([id number]) for Aortic stenosis/cardiomyopathy (I35.0) with Dr. Margarita Nguyen. Lab order placed. Does not need to hold any medications.  Thanks, Abdiel Hall

## 2023-12-07 LAB
ANION GAP SERPL CALC-SCNC: 4 MMOL/L (ref 5–15)
BUN SERPL-MCNC: 17 MG/DL (ref 6–20)
BUN/CREAT SERPL: 13 (ref 12–20)
CALCIUM SERPL-MCNC: 8.9 MG/DL (ref 8.5–10.1)
CHLORIDE SERPL-SCNC: 107 MMOL/L (ref 97–108)
CO2 SERPL-SCNC: 29 MMOL/L (ref 21–32)
CREAT SERPL-MCNC: 1.3 MG/DL (ref 0.7–1.3)
ERYTHROCYTE [DISTWIDTH] IN BLOOD BY AUTOMATED COUNT: 12.6 % (ref 11.5–14.5)
GLUCOSE SERPL-MCNC: 103 MG/DL (ref 65–100)
HCT VFR BLD AUTO: 40.7 % (ref 36.6–50.3)
HGB BLD-MCNC: 13.7 G/DL (ref 12.1–17)
MCH RBC QN AUTO: 30.2 PG (ref 26–34)
MCHC RBC AUTO-ENTMCNC: 33.7 G/DL (ref 30–36.5)
MCV RBC AUTO: 89.8 FL (ref 80–99)
NRBC # BLD: 0 K/UL (ref 0–0.01)
NRBC BLD-RTO: 0 PER 100 WBC
PLATELET # BLD AUTO: 116 K/UL (ref 150–400)
PMV BLD AUTO: 10.6 FL (ref 8.9–12.9)
POTASSIUM SERPL-SCNC: 4.3 MMOL/L (ref 3.5–5.1)
RBC # BLD AUTO: 4.53 M/UL (ref 4.1–5.7)
SODIUM SERPL-SCNC: 140 MMOL/L (ref 136–145)
WBC # BLD AUTO: 5.9 K/UL (ref 4.1–11.1)

## 2023-12-07 RX ORDER — SODIUM CHLORIDE 0.9 % (FLUSH) 0.9 %
5-40 SYRINGE (ML) INJECTION PRN
Status: CANCELLED | OUTPATIENT
Start: 2023-12-07

## 2023-12-07 RX ORDER — SODIUM CHLORIDE 9 MG/ML
INJECTION, SOLUTION INTRAVENOUS CONTINUOUS
Status: CANCELLED | OUTPATIENT
Start: 2023-12-07 | End: 2023-12-08

## 2024-01-02 ENCOUNTER — TELEPHONE (OUTPATIENT)
Age: 79
End: 2024-01-02

## 2024-01-02 NOTE — TELEPHONE ENCOUNTER
Patient wife called ? If patient should stop taking his aspirin for procedure on 01/08 would like callback    Wife# [phone]

## 2024-01-08 ENCOUNTER — HOSPITAL ENCOUNTER (OUTPATIENT)
Facility: HOSPITAL | Age: 79
Discharge: HOME | End: 2024-01-08
Attending: INTERNAL MEDICINE | Admitting: INTERNAL MEDICINE
Payer: MEDICARE

## 2024-01-08 VITALS
WEIGHT: 185 LBS | HEART RATE: 68 BPM | OXYGEN SATURATION: 95 % | BODY MASS INDEX: 25.9 KG/M2 | TEMPERATURE: 97.7 F | SYSTOLIC BLOOD PRESSURE: 133 MMHG | DIASTOLIC BLOOD PRESSURE: 76 MMHG | HEIGHT: 71 IN | RESPIRATION RATE: 19 BRPM

## 2024-01-08 DIAGNOSIS — I42.9 CARDIOMYOPATHY (HCC): ICD-10-CM

## 2024-01-08 DIAGNOSIS — I35.0 AORTIC STENOSIS: ICD-10-CM

## 2024-01-08 LAB
ANION GAP SERPL CALC-SCNC: 2 MMOL/L (ref 5–15)
BUN SERPL-MCNC: 20 MG/DL (ref 6–20)
BUN/CREAT SERPL: 16 (ref 12–20)
CALCIUM SERPL-MCNC: 8.2 MG/DL (ref 8.5–10.1)
CHLORIDE SERPL-SCNC: 109 MMOL/L (ref 97–108)
CO2 SERPL-SCNC: 30 MMOL/L (ref 21–32)
CREAT SERPL-MCNC: 1.24 MG/DL (ref 0.7–1.3)
ECHO BSA: 2.05 M2
ERYTHROCYTE [DISTWIDTH] IN BLOOD BY AUTOMATED COUNT: 12.6 % (ref 11.5–14.5)
GLUCOSE SERPL-MCNC: 111 MG/DL (ref 65–100)
HCT VFR BLD AUTO: 38.8 % (ref 36.6–50.3)
HGB BLD-MCNC: 12.8 G/DL (ref 12.1–17)
MCH RBC QN AUTO: 30.1 PG (ref 26–34)
MCHC RBC AUTO-ENTMCNC: 33 G/DL (ref 30–36.5)
MCV RBC AUTO: 91.3 FL (ref 80–99)
NRBC # BLD: 0 K/UL (ref 0–0.01)
NRBC BLD-RTO: 0 PER 100 WBC
PLATELET # BLD AUTO: 89 K/UL (ref 150–400)
PMV BLD AUTO: 10.7 FL (ref 8.9–12.9)
POTASSIUM SERPL-SCNC: 4 MMOL/L (ref 3.5–5.1)
RBC # BLD AUTO: 4.25 M/UL (ref 4.1–5.7)
SODIUM SERPL-SCNC: 141 MMOL/L (ref 136–145)
WBC # BLD AUTO: 5.4 K/UL (ref 4.1–11.1)

## 2024-01-08 PROCEDURE — 99153 MOD SED SAME PHYS/QHP EA: CPT | Performed by: INTERNAL MEDICINE

## 2024-01-08 PROCEDURE — C1894 INTRO/SHEATH, NON-LASER: HCPCS | Performed by: INTERNAL MEDICINE

## 2024-01-08 PROCEDURE — 36415 COLL VENOUS BLD VENIPUNCTURE: CPT

## 2024-01-08 PROCEDURE — 6360000004 HC RX CONTRAST MEDICATION: Performed by: INTERNAL MEDICINE

## 2024-01-08 PROCEDURE — 99152 MOD SED SAME PHYS/QHP 5/>YRS: CPT | Performed by: INTERNAL MEDICINE

## 2024-01-08 PROCEDURE — 2709999900 HC NON-CHARGEABLE SUPPLY: Performed by: INTERNAL MEDICINE

## 2024-01-08 PROCEDURE — C1887 CATHETER, GUIDING: HCPCS | Performed by: INTERNAL MEDICINE

## 2024-01-08 PROCEDURE — C1769 GUIDE WIRE: HCPCS | Performed by: INTERNAL MEDICINE

## 2024-01-08 PROCEDURE — 2500000003 HC RX 250 WO HCPCS: Performed by: INTERNAL MEDICINE

## 2024-01-08 PROCEDURE — 76937 US GUIDE VASCULAR ACCESS: CPT | Performed by: INTERNAL MEDICINE

## 2024-01-08 PROCEDURE — 93463 DRUG ADMIN & HEMODYNMIC MEAS: CPT | Performed by: INTERNAL MEDICINE

## 2024-01-08 PROCEDURE — 6360000002 HC RX W HCPCS: Performed by: INTERNAL MEDICINE

## 2024-01-08 PROCEDURE — 85027 COMPLETE CBC AUTOMATED: CPT

## 2024-01-08 PROCEDURE — 93460 R&L HRT ART/VENTRICLE ANGIO: CPT | Performed by: INTERNAL MEDICINE

## 2024-01-08 PROCEDURE — 2580000003 HC RX 258: Performed by: INTERNAL MEDICINE

## 2024-01-08 PROCEDURE — 80048 BASIC METABOLIC PNL TOTAL CA: CPT

## 2024-01-08 PROCEDURE — C1713 ANCHOR/SCREW BN/BN,TIS/BN: HCPCS | Performed by: INTERNAL MEDICINE

## 2024-01-08 PROCEDURE — 75716 ARTERY X-RAYS ARMS/LEGS: CPT | Performed by: INTERNAL MEDICINE

## 2024-01-08 PROCEDURE — C1725 CATH, TRANSLUMIN NON-LASER: HCPCS | Performed by: INTERNAL MEDICINE

## 2024-01-08 RX ORDER — DOBUTAMINE HYDROCHLORIDE 200 MG/100ML
INJECTION INTRAVENOUS CONTINUOUS PRN
Status: DISCONTINUED | OUTPATIENT
Start: 2024-01-08 | End: 2024-01-08 | Stop reason: HOSPADM

## 2024-01-08 RX ORDER — MIDAZOLAM HYDROCHLORIDE 1 MG/ML
INJECTION INTRAMUSCULAR; INTRAVENOUS PRN
Status: DISCONTINUED | OUTPATIENT
Start: 2024-01-08 | End: 2024-01-08 | Stop reason: HOSPADM

## 2024-01-08 RX ORDER — FENTANYL CITRATE 50 UG/ML
INJECTION, SOLUTION INTRAMUSCULAR; INTRAVENOUS PRN
Status: DISCONTINUED | OUTPATIENT
Start: 2024-01-08 | End: 2024-01-08 | Stop reason: HOSPADM

## 2024-01-08 RX ORDER — SODIUM CHLORIDE 9 MG/ML
INJECTION, SOLUTION INTRAVENOUS CONTINUOUS
Status: DISPENSED | OUTPATIENT
Start: 2024-01-08 | End: 2024-01-08

## 2024-01-08 RX ORDER — LIDOCAINE HYDROCHLORIDE 10 MG/ML
INJECTION, SOLUTION INFILTRATION; PERINEURAL PRN
Status: DISCONTINUED | OUTPATIENT
Start: 2024-01-08 | End: 2024-01-08 | Stop reason: HOSPADM

## 2024-01-08 RX ORDER — SODIUM CHLORIDE 0.9 % (FLUSH) 0.9 %
5-40 SYRINGE (ML) INJECTION PRN
Status: DISCONTINUED | OUTPATIENT
Start: 2024-01-08 | End: 2024-01-08 | Stop reason: HOSPADM

## 2024-01-08 RX ORDER — SODIUM CHLORIDE 9 MG/ML
INJECTION, SOLUTION INTRAVENOUS PRN
Status: DISCONTINUED | OUTPATIENT
Start: 2024-01-08 | End: 2024-01-08 | Stop reason: HOSPADM

## 2024-01-08 RX ORDER — ACETAMINOPHEN 325 MG/1
650 TABLET ORAL EVERY 4 HOURS PRN
Status: DISCONTINUED | OUTPATIENT
Start: 2024-01-08 | End: 2024-01-08 | Stop reason: HOSPADM

## 2024-01-08 RX ORDER — HEPARIN SODIUM 200 [USP'U]/100ML
INJECTION, SOLUTION INTRAVENOUS CONTINUOUS PRN
Status: COMPLETED | OUTPATIENT
Start: 2024-01-08 | End: 2024-01-08

## 2024-01-08 RX ORDER — SODIUM CHLORIDE 0.9 % (FLUSH) 0.9 %
5-40 SYRINGE (ML) INJECTION EVERY 12 HOURS SCHEDULED
Status: DISCONTINUED | OUTPATIENT
Start: 2024-01-08 | End: 2024-01-08 | Stop reason: HOSPADM

## 2024-01-08 RX ORDER — SODIUM CHLORIDE 9 MG/ML
INJECTION, SOLUTION INTRAVENOUS CONTINUOUS
Status: DISCONTINUED | OUTPATIENT
Start: 2024-01-08 | End: 2024-01-08 | Stop reason: HOSPADM

## 2024-01-08 RX ORDER — HEPARIN SODIUM 1000 [USP'U]/ML
INJECTION, SOLUTION INTRAVENOUS; SUBCUTANEOUS PRN
Status: DISCONTINUED | OUTPATIENT
Start: 2024-01-08 | End: 2024-01-08 | Stop reason: HOSPADM

## 2024-01-08 RX ADMIN — SODIUM CHLORIDE: 9 INJECTION, SOLUTION INTRAVENOUS at 09:22

## 2024-01-08 ASSESSMENT — EJECTION FRACTION
EF_VALUE: .2
EF_VALUE: .31
EF_VALUE: .2
EF_VALUE: .3

## 2024-01-08 NOTE — PROGRESS NOTES
1435:  Discharge instructions explained to Aakash Faust and wife Kirstin, all questions answered, and patient and wife verbalized understanding.  Copy of discharge instructions given to patient.     1438:  Aakash Faust ambulated @ 1438 (time) approximately 20 feet.    Patient tolerated ambulation without adverse advents.      Left radial and right IJ (right/left, groin/arm)  without bleeding or hematoma noted.      1446: Patient taken out via wheelchair Patient discharged to the care of wife Kirstin.

## 2024-01-08 NOTE — H&P
neurological condition as his cardiac condition and even if we identify and treat any major cardiac pathology, there may be a progressive decline in his forgetfulness and memory over intermediate-term.    If aortic stenosis not deemed as severe and he does not have high ischemic burden, possibility of PVCs contributing to tiredness and fatigue cannot be completely excluded and may be reasonable to perform a 24-hour Holter to assess PVC burden.    Investigations ordered    []    High complexity decision making was performed  []    Patient is at high-risk of decompensation with multiple organ involvement  []    Complex/difficult social determinants of health outcomes  Total of ** minutes were spent on reviewing the records, analyzing investigations and documentation in the chart, on the day of visit including time for examination and time spent with the patient  Investigations personally reviewed and interpreted  Outside records reviewed: Echocardiogram EF 35 to 40%.,  Previous EF on echocardiogram in 2019 was 50%.  Mean transaortic gradient of 21 mmHg  Outside [facility] Cardiolite stress test demonstrates EF of 36% with fixed moderate size inferior defect.    HPI: Aakash Faust, a 78 y.o. year-old who is seen for evaluation of aortic valve disease and cardiomyopathy.  He has been having progressive tiredness and fatigue over the past 6 to 12 months.  It may partly be driven by forgetfulness, early dementia for which she has been treated by Aricept.  According to his wife on occasions when he takes Cymbalta, his energy levels are improved but on other days he has been feeling extremely fatigued and tired with significant inertia.  Certain activities which she was able to easily complete quickly over the last 2 years have become an ordeal and take much longer to complete.    Past Medical History:   Diagnosis Date    Coronary atherosclerosis of unspecified type of vessel, native or graft 11/19/2010    Mixed

## 2024-01-08 NOTE — PROGRESS NOTES
Cardiac Cath Lab Procedure Area Arrival Note:    Aakash Faust arrived to Cardiac Cath Lab, Procedure Area. Patient identifiers verified with NAME and DATE OF BIRTH. Procedure verified with patient. Consent forms verified. Allergies verified. Patient informed of procedure and plan of care. Questions answered with review. Patient voiced understanding of procedure and plan of care.    Patient on cardiac monitor, non-invasive blood pressure, SPO2 monitor. On RA . Patient status doing well without problems. Patient is A&Ox 4. Patient reports no CP or SOB.     Patient medicated during procedure with orders obtained and verified by Dr. Jerome.    Refer to patients Cardiac Cath Lab PROCEDURE REPORT for vital signs, assessment, status, and response during procedure, printed at end of case. Printed report on chart or scanned into chart.

## 2024-01-08 NOTE — PROGRESS NOTES
1223:  TRANSFER - IN REPORT:    Verbal report received from RN on Aakash Faust , from the Cardiac Cath lab, for routine progression of care. Report consisted of patient’s Situation, Background, Assessment and Recommendations(SBAR). Opportunity for questions and clarification was provided. Assessment completed upon patient’s arrival to Cardiac Cath Lab RECOVERY AREA and care assumed.    Cardiac Cath Lab Recovery Arrival Note:     Aakash Faust arrived to [facility] recovery area.  Patient procedure= R/LHC. Patient on cardiac monitor, non-invasive blood pressure, Patient status doing well without problems. Patient is A&Ox 4. Patient reports no complaints. Procedure site without any bleeding and no hematoma.

## 2024-01-08 NOTE — PROGRESS NOTES
CATH LAB to RECOVERY ROOM REPORT    Procedure: LHC/RHC/Valve Study    MD: ALIE Jerome MD    The procedure was diagnostic only.    Verbal Report given to Recovery Nurse on patient being transferred to Cardiac Cath Lab  for routine post-op. Patient stable upon transfer to .  Vitals, mental status, MAR, procedural summary discussed with recovery RN.    Medication given during procedure:    Contrast:40mL                          Heparin:4000units     Versed:2mg                                                               Fentanyl:25mcg                                                           Dobutamine study    Sheaths:    Left radial sheath pulled at 1212 pm, band at 10mL of air      Right internal jugular vein sheath pulled at 1216 pm, secured with a band-aid.

## 2024-01-08 NOTE — DISCHARGE INSTRUCTIONS
Future Appointments   Date Time Provider Department [place]   2/27/2024  1:40 PM Aakash Murcia III, MD [facility]     Radial Cardiac Catheterization / Angiography Discharge Instructions    It is normal to feel tired the first couple days.  Take it easy and follow the physician’s instructions.    CHECK THE CATHETER INSERTION SITE DAILY:  Remove the wrist dressing 24 hours after the procedure.  You may shower 24 hours after the procedure.  Wash with soap and water and pat dry.  Gentle cleaning of the site with soap and water is sufficient, cover with a dry clean dressing or bandage.  Do not apply creams or powders to the area.  No soaking the wrist for 3 days.  Leave the puncture site open to air after 24 hours post-procedure.    CALL THE PHYSICIAN:  If the site becomes red, swollen or feels warm to the touch.  If there is bleeding or drainage or if there is unusual pain at the radial site.  If there is any minor oozing, you may apply a band-aid and remove after 12 hours.  If the bleeding continues, hold pressure with the middle finger against the puncture site and the thumb against the back of the wrist, call 911 to be transported to the hospital.  DO NOT DRIVE YOURSELF, OR HAVE ANYONE ELSE DRIVE YOU  - CALL 911.    ACTIVITY:  For the first 24 hours do not manipulate the wrist.  No lifting, pushing or pulling over 3-5 pounds with the affected wrist for 7 days and no straining the insertion site.  Do not lift grocery bags or the garbage can, do not run the vacuum  or  for 7 days.  Start with short walks as in the hospital and gradually increase as tolerated each day.  It is recommended to walk 30 minutes 5-7 days per week.  Follow your physician’s instructions on activity.  Avoid walking outside in extremes of heat or cold. Walk inside when it is cold and windy or hot and humid.    THINGS TO KEEP IN MIND:  No driving for at least 24 hours, or as designated by your physician.  Limit the number of

## 2024-01-09 ENCOUNTER — TELEPHONE (OUTPATIENT)
Age: 79
End: 2024-01-09

## 2024-01-09 DIAGNOSIS — I35.0 AORTIC VALVE STENOSIS, ETIOLOGY OF CARDIAC VALVE DISEASE UNSPECIFIED: Primary | ICD-10-CM

## 2024-01-09 NOTE — TELEPHONE ENCOUNTER
Telephone call made to patient's wife on HIPPA. Two patient identifiers verified.   Called to schedule echo  Pt wife wondering if they can use echo from November or cath completed yesterday instead of having another echo  Pt wife also concerned abt pt low platelet count  Will relay msg to Dr. Jerome and Nidia SMITH for advice and get back to pt/ pt's wife

## 2024-01-09 NOTE — TELEPHONE ENCOUNTER
Pt is rtc back to the nurse.Pt said that the nurse could download the Stress Echocardiogram from  the power share.    Kaitlin  [phone]

## 2024-01-09 NOTE — TELEPHONE ENCOUNTER
Andry ordered to  echo CD from [address] 4th floor from [facility] and bring to our office so pt will not have to repeat echo

## 2024-01-09 NOTE — TELEPHONE ENCOUNTER
Telephone call made to patient's wife on HIPAA . Two patient identifiers verified.     Pt wife would like to get the CD of echo from [facility]; will call back Monday to check on status.

## 2024-01-09 NOTE — TELEPHONE ENCOUNTER
----- Message from Barby Ferguson RN sent at 1/9/2024  7:52 AM EST -----    ----- Message -----  From: Neymar Jerome MD  Sent: 1/8/2024   4:19 PM EST  To: Kanika Flannery RN; Libra Novak RN; #    Can we order an echocardiogram in our clinic to reassess his aortic valve.  Just informed the patient that this is some additional information that we will require as we discussed his case in our valve conference

## 2024-01-09 NOTE — TELEPHONE ENCOUNTER
----- Message from CARLOS Hoang CNP sent at 1/9/2024 12:48 PM EST -----  If she wants to try and get the images on a CD for us, she's welcome to.  Its typically a nightmare and hard to do (in our experience).     Otherwise, would get him scheduled for one here sooner than later.  I can't address the platelet count exactly, he has trended lower.  Could consider stopping his baby asa, will discuss with chery Jacob   ----- Message -----  From: Denae Townsend RN  Sent: 1/9/2024  11:42 AM EST  To: CARLOS Hoang CNP; #    I just called Mr. Faust's wife and she said he had an echo completed this past November at [facility] and is wondering if that can be used; She mentioned being able to get the film?    She is also concerned about his PLT [place] being down to [id number]; please advise!    Thank you!  ----- Message -----  From: Nidia Jones APRN - CNP  Sent: 1/9/2024  10:48 AM EST  To: Neymar Jerome MD; Denae Townsend RN    Actually,  sooner the better (its not an emergency) but for valve clinic planning purposes we need one soon.    Dara Jacob   ----- Message -----  From: Denae Townsend RN  Sent: 1/9/2024   9:40 AM EST  To: CARLOS Hoang CNP; #    I have booked his echo for 2/27 when he comes in to see Dr. Murcia. Is this ok or do you need it to be completed sooner?  Thanks!  Denae  ----- Message -----  From: Barby Ferguson RN  Sent: 1/9/2024   7:52 AM EST  To: Denae Townsend RN      ----- Message -----  From: Neymar Jerome MD  Sent: 1/8/2024   4:19 PM EST  To: Kanika Flannery RN; Libra Novak RN; #    Can we order an echocardiogram in our clinic to reassess his aortic valve.  Just informed the patient that this is some additional information that we will require as we discussed his case in our valve conference

## 2024-01-10 ENCOUNTER — TELEPHONE (OUTPATIENT)
Age: 79
End: 2024-01-10

## 2024-01-10 ENCOUNTER — CLINICAL DOCUMENTATION (OUTPATIENT)
Age: 79
End: 2024-01-10

## 2024-01-10 NOTE — TELEPHONE ENCOUNTER
----- Message from CARLOS Hoang CNP sent at 1/9/2024  2:58 PM EST -----  In regards to aspirin,  ideally should be on it for CAD.  But with lower platelet counts, is he having any excessive bleeding/bruising?  If so, could hold aspirin for now.      If he's not having any issues, would be best to stay on it.      Dara Jacob   ----- Message -----  From: Denae Townsend RN  Sent: 1/9/2024  11:42 AM EST  To: CARLOS Hoang CNP; #    I just called Mr. Faust's wife and she said he had an echo completed this past November at [facility] and is wondering if that can be used; She mentioned being able to get the film?    She is also concerned about his PLT [place] being [place] to [id number]; please advise!    Thank you!  ----- Message -----  From: Nidia Jones APRN - CNP  Sent: 1/9/2024  10:48 AM EST  To: Neymar Jerome MD; Denae Townsend RN    Actually,  sooner the better (its not an emergency) but for valve clinic planning purposes we need one soon.    Dara Jacob   ----- Message -----  From: Denae Townsend RN  Sent: 1/9/2024   9:40 AM EST  To: CARLOS Hoang CNP; #    I have booked his echo for 2/27 when he comes in to see Dr. Murcia. Is this ok or do you need it to be completed sooner?  Thanks!  Denae  ----- Message -----  From: Barby Ferguson RN  Sent: 1/9/2024   7:52 AM EST  To: Denae Townsend RN      ----- Message -----  From: Neymar Jerome MD  Sent: 1/8/2024   4:19 PM EST  To: Kanika Flannery, TREMAYNE; Libra Novak, RN; #    Can we order an echocardiogram in our clinic to reassess his aortic valve.  Just informed the patient that this is some additional information that we will require as we discussed his case in our valve conference

## 2024-01-11 ENCOUNTER — TELEPHONE (OUTPATIENT)
Age: 79
End: 2024-01-11

## 2024-01-11 NOTE — TELEPHONE ENCOUNTER
----- Message from Ernst Parmar MD sent at 1/10/2024  4:12 PM EST -----  Ok, I let the nurses know so they can schedule BIV ICD      ----- Message -----  From: Neymar Jerome MD  Sent: 1/10/2024   3:51 PM EST  To: Rashmi Brasher MD; Erika Atkins, CARLOS - NP; #    I was able to personally review his actual echo images from [facility].  I think his valve is  severe( and not pseudosevere) and his cardiomyopathy has been underestimated.  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  His risk of needing a pacemaker after TAVR is very high.  So my suggestion would be to first consider a CRT-D in him since we cannot further optimize his medications.    Denae, we can keep his echo date towards the end of January early February when he sees Dr. Murcia.  Also try to get him into see Dr. Parmar/Dr. Brasher for discussion regarding CRT-D.  Once that is done, plan to proceed with TAVR 6 to 8 weeks after device placement.    Sandra, informed the patient about the plan and set him up for follow-up in valve clinic following CTA abdomen pelvis and chest TAVR protocol probably sometime in late February.  ----- Message -----  From: Denae Townsend RN  Sent: 1/10/2024  10:57 AM EST  To: CARLOS Hoang CNP; #    Got the CD; it's being loaded now!    ----- Message -----  From: Neymar Jerome MD  Sent: 1/9/2024   4:35 PM EST  To: CARLOS Hoang CNP; #    We can inform her that the reason we are repeating is that the numbers on the echo at [facility] were not consistent with our cath number. We can ask her to either get the actual images or we can repeat an echo with us.  ----- Message -----  From: Denae Townsend RN  Sent: 1/9/2024  11:42 AM EST  To: Nidia Jones, CARLOS - CNP; #    I just called Mr. Faust's wife and she said he

## 2024-01-12 NOTE — TELEPHONE ENCOUNTER
Called the patient's wife. Walked through the timeline. He is currently scheduled for consult with Dr. Brasher on 4/9.  Discussed this extended time to appt with Nidia Jones who will f/u with Dr. Jerome.      I gave the patient's wife Valve Clinic phone number and explained that we are happy to help her in regards to questions and navigation through this process.  Mrs. Faust verbalized understanding and agreed.

## 2024-01-26 ENCOUNTER — PATIENT MESSAGE (OUTPATIENT)
Age: 79
End: 2024-01-26

## 2024-01-29 ENCOUNTER — TELEPHONE (OUTPATIENT)
Age: 79
End: 2024-01-29

## 2024-01-29 NOTE — TELEPHONE ENCOUNTER
Verified patient with two types of identifiers.  Spoke with wife Kirstin, verified with PHI.  Informed wife that an earlier appointment is available and patient has been scheduled to see Dr. Parmar on 2/22 at 840 am.  Location confirmed.  Patient verbalized understanding and will call with any other questions.

## 2024-02-22 ENCOUNTER — OFFICE VISIT (OUTPATIENT)
Age: 79
End: 2024-02-22
Payer: MEDICARE

## 2024-02-22 VITALS
RESPIRATION RATE: 18 BRPM | HEART RATE: 70 BPM | DIASTOLIC BLOOD PRESSURE: 98 MMHG | SYSTOLIC BLOOD PRESSURE: 130 MMHG | OXYGEN SATURATION: 96 % | WEIGHT: 185 LBS | BODY MASS INDEX: 25.9 KG/M2 | HEIGHT: 71 IN

## 2024-02-22 DIAGNOSIS — I25.10 ATHEROSCLEROSIS OF NATIVE CORONARY ARTERY OF NATIVE HEART WITHOUT ANGINA PECTORIS: ICD-10-CM

## 2024-02-22 DIAGNOSIS — I25.5 ISCHEMIC CARDIOMYOPATHY: ICD-10-CM

## 2024-02-22 DIAGNOSIS — I35.0 NONRHEUMATIC AORTIC VALVE STENOSIS: ICD-10-CM

## 2024-02-22 DIAGNOSIS — I44.0 FIRST DEGREE AV BLOCK: ICD-10-CM

## 2024-02-22 DIAGNOSIS — I45.2 BIFASCICULAR BUNDLE BRANCH BLOCK: ICD-10-CM

## 2024-02-22 DIAGNOSIS — Z95.2 S/P TAVR (TRANSCATHETER AORTIC VALVE REPLACEMENT): ICD-10-CM

## 2024-02-22 DIAGNOSIS — I50.22 CHRONIC SYSTOLIC HEART FAILURE (HCC): Primary | ICD-10-CM

## 2024-02-22 PROCEDURE — G8484 FLU IMMUNIZE NO ADMIN: HCPCS | Performed by: INTERNAL MEDICINE

## 2024-02-22 PROCEDURE — 99204 OFFICE O/P NEW MOD 45 MIN: CPT | Performed by: INTERNAL MEDICINE

## 2024-02-22 PROCEDURE — G8419 CALC BMI OUT NRM PARAM NOF/U: HCPCS | Performed by: INTERNAL MEDICINE

## 2024-02-22 PROCEDURE — G8427 DOCREV CUR MEDS BY ELIG CLIN: HCPCS | Performed by: INTERNAL MEDICINE

## 2024-02-22 PROCEDURE — 1123F ACP DISCUSS/DSCN MKR DOCD: CPT | Performed by: INTERNAL MEDICINE

## 2024-02-22 PROCEDURE — 1036F TOBACCO NON-USER: CPT | Performed by: INTERNAL MEDICINE

## 2024-02-22 RX ORDER — TAMSULOSIN HYDROCHLORIDE 0.4 MG/1
1 CAPSULE ORAL EVERY EVENING
COMMUNITY
Start: 2023-02-17 | End: 2024-02-22

## 2024-02-22 RX ORDER — ROSUVASTATIN CALCIUM 5 MG/1
1 TABLET, COATED ORAL DAILY
COMMUNITY
End: 2024-02-22

## 2024-02-22 NOTE — PROGRESS NOTES
Cardiac Electrophysiology Office Follow-up     NAME: Aakash Faust   : 1945   MRM: [id number]     Date: 2024     Primary Cardiologist: Dr. Murcia/Justus    Assessment and Plan:      Diagnosis Orders   1. Chronic systolic heart failure (HCC)        2. Ischemic cardiomyopathy        3. Nonrheumatic aortic valve stenosis        4. Atherosclerosis of native coronary artery of native heart without angina pectoris        5. S/P TAVR (transcatheter aortic valve replacement)        6. First degree AV block        7. Bifascicular bundle branch block        24    ECHO (TTE) COMPLETE (PRN CONTRAST/BUBBLE/STRAIN/3D) 2024  8:57 AM (Final)    Interpretation Summary    Left Ventricle: Moderately reduced left ventricular systolic function with a visually estimated EF of 30 - 35%. Left ventricle is mildly dilated. Normal wall thickness. Moderate global hypokinesis present. Abnormal diastolic function.    Left Atrium: Left atrium is moderately dilated. Left atrial volume index is mildly increased (35-41 mL/m2).    Right Ventricle: Not well visualized.    Aortic Valve: Trileaflet valve. Moderately calcified cusp. Moderately restricted motion. Mild regurgitation with an eccentrically directed jet and may underestimate severity. Possible low flow/low gradient severe aortic stenosis with low EF. Noted by the apical view. AV mean gradient is 13 mmHg. AV peak velocity is 2.3 m/s. LVOT:AV VTI Index is 0.20. AV area by continuity VTI is 0.9 cm2.    Mitral Valve: Mild regurgitation.    Tricuspid Valve: Trace regurgitation. The estimated RVSP is 35 mmHg.    Image quality is adequate.    Signed by: Aakash Murcia III, MD on 2024  8:57 AM      Ischemic Cardiomyopathy: Echo done at [facility] 2023 with LVEF 35%. EKG 2023 with .   Sinus  Rhythm  -First degree A-V block   Clifford = 264 ms  -Right bundle branch block with left axis -bifascicular block.     Unable to tolerate GDMT due to hypotension.

## 2024-02-27 ENCOUNTER — OFFICE VISIT (OUTPATIENT)
Age: 79
End: 2024-02-27
Payer: MEDICARE

## 2024-02-27 ENCOUNTER — TELEPHONE (OUTPATIENT)
Age: 79
End: 2024-02-27

## 2024-02-27 ENCOUNTER — ANCILLARY PROCEDURE (OUTPATIENT)
Age: 79
End: 2024-02-27
Payer: MEDICARE

## 2024-02-27 VITALS
HEART RATE: 73 BPM | OXYGEN SATURATION: 94 % | WEIGHT: 184 LBS | SYSTOLIC BLOOD PRESSURE: 128 MMHG | BODY MASS INDEX: 25.76 KG/M2 | HEIGHT: 71 IN | DIASTOLIC BLOOD PRESSURE: 64 MMHG

## 2024-02-27 VITALS — WEIGHT: 184 LBS | HEIGHT: 71 IN | BODY MASS INDEX: 25.76 KG/M2

## 2024-02-27 DIAGNOSIS — I35.0 AORTIC VALVE STENOSIS, ETIOLOGY OF CARDIAC VALVE DISEASE UNSPECIFIED: ICD-10-CM

## 2024-02-27 DIAGNOSIS — I42.0 DILATED CARDIOMYOPATHY (HCC): ICD-10-CM

## 2024-02-27 DIAGNOSIS — I35.0 AORTIC VALVE STENOSIS, ETIOLOGY OF CARDIAC VALVE DISEASE UNSPECIFIED: Primary | ICD-10-CM

## 2024-02-27 DIAGNOSIS — I25.5 ISCHEMIC CARDIOMYOPATHY: ICD-10-CM

## 2024-02-27 DIAGNOSIS — I25.10 CORONARY ARTERY DISEASE INVOLVING NATIVE CORONARY ARTERY OF NATIVE HEART WITHOUT ANGINA PECTORIS: ICD-10-CM

## 2024-02-27 DIAGNOSIS — I50.22 CHRONIC SYSTOLIC HEART FAILURE (HCC): Primary | ICD-10-CM

## 2024-02-27 PROCEDURE — 1123F ACP DISCUSS/DSCN MKR DOCD: CPT | Performed by: SPECIALIST

## 2024-02-27 PROCEDURE — 93306 TTE W/DOPPLER COMPLETE: CPT | Performed by: SPECIALIST

## 2024-02-27 PROCEDURE — G8427 DOCREV CUR MEDS BY ELIG CLIN: HCPCS | Performed by: SPECIALIST

## 2024-02-27 PROCEDURE — 1036F TOBACCO NON-USER: CPT | Performed by: SPECIALIST

## 2024-02-27 PROCEDURE — G8419 CALC BMI OUT NRM PARAM NOF/U: HCPCS | Performed by: SPECIALIST

## 2024-02-27 PROCEDURE — G8484 FLU IMMUNIZE NO ADMIN: HCPCS | Performed by: SPECIALIST

## 2024-02-27 PROCEDURE — 99214 OFFICE O/P EST MOD 30 MIN: CPT | Performed by: SPECIALIST

## 2024-02-27 RX ORDER — LOSARTAN POTASSIUM 25 MG/1
25 TABLET ORAL DAILY
Qty: 90 TABLET | Refills: 1 | Status: SHIPPED | OUTPATIENT
Start: 2024-02-27

## 2024-02-27 RX ORDER — DONEPEZIL HYDROCHLORIDE 10 MG/1
10 TABLET, FILM COATED ORAL NIGHTLY
COMMUNITY

## 2024-02-27 RX ORDER — TAMSULOSIN HYDROCHLORIDE 0.4 MG/1
0.4 CAPSULE ORAL DAILY
COMMUNITY

## 2024-02-27 NOTE — TELEPHONE ENCOUNTER
Called patient to schedule BIV ICD on 3/4 with Dr. Parmar but no answer.  Left voicemail with request for return call.  Will send [employer] message as well.

## 2024-02-27 NOTE — PROGRESS NOTES
HISTORY OF PRESENT ILLNESS  Aakash Faust is a 78 y.o. male     SUMMARY:   Patient Active Problem List   Diagnosis    Coronary atherosclerosis    Mixed hyperlipidemia    Aortic stenosis    Cardiomyopathy (HCC)            CARDIOLOGY STUDIES TO DATE:  2/9/11. Adenosine cardiolyte. Cardiac stress testing was pos for chest pain and myocardial ischemia. Myocardial perfusion imaging showed reversible deficits.of the ant lat wall Gated EF (%): 49     2/19 negative lexiscan cardiolyte  2/19 echo with lvef 50%, lae, mild mr and tr without pul htn    11/23 [place], cardiolyte stress test with fixed inferior defect, lvef 36%  11/23 [place], echo lve, lvef 35-40%,,rve with mildly reduced rvef,carlitos, mean av grad 21 with mild regurg,mild mr    1/24 cardiac cath    Severe native multivessel coronary artery disease  2.  Occluded vein graft to right coronary artery with native RCA filling of some bridging collaterals with suboptimal flow  3.  Patent vein graft to large circumflex system as well as patent LIMA to LAD  4.  Mean transaortic gradient of about 29 to 30 mmHg at rest.  Calculated aortic valve area of 0.9 cm.  But in the setting of reduced cardiac output and reduce peak systolic flow rate of about 200 mL/s.  After initiation of dobutamine infusion at a rate of 10 mcg/KG per minute, mean gradient increased to 43 mmHg with increase in peak systolic flow rate to over 300 mL/s in the setting of increased cardiac output to about 6 L/min.  However aortic valve area increased to about 1.07 cm²    Chief Complaint   Patient presents with    Valvular Heart Disease       HPI :  Since we last met he did have a cardiac catheterization with dobutamine to evaluate his valve and I have reviewed and summarized that above.  He then saw Dr. Parmar and it appears that the thinking was she should get a BiV device prior to considering TAVR but none of that happened yet.  The wife says that if he is not going to get the valve replaced

## 2024-02-28 ENCOUNTER — TELEPHONE (OUTPATIENT)
Age: 79
End: 2024-02-28

## 2024-02-28 PROBLEM — I50.22 CHRONIC SYSTOLIC HEART FAILURE (HCC): Status: ACTIVE | Noted: 2024-02-27

## 2024-02-28 LAB
ECHO AO ASC DIAM: 3.4 CM
ECHO AO ASCENDING AORTA INDEX: 1.67 CM/M2
ECHO AO ROOT DIAM: 3.7 CM
ECHO AO ROOT INDEX: 1.81 CM/M2
ECHO AR MAX VEL PISA: 4.6 M/S
ECHO AV AREA PEAK VELOCITY: 1.1 CM2
ECHO AV AREA VTI: 0.9 CM2
ECHO AV AREA/BSA PEAK VELOCITY: 0.5 CM2/M2
ECHO AV AREA/BSA VTI: 0.4 CM2/M2
ECHO AV MEAN GRADIENT: 13 MMHG
ECHO AV MEAN VELOCITY: 1.8 M/S
ECHO AV PEAK GRADIENT: 21 MMHG
ECHO AV PEAK VELOCITY: 2.3 M/S
ECHO AV REGURGITANT PHT: 680.2 MILLISECOND
ECHO AV VELOCITY RATIO: 0.26
ECHO AV VTI: 46.4 CM
ECHO BSA: 2.04 M2
ECHO EST RA PRESSURE: 3 MMHG
ECHO LA DIAMETER INDEX: 2.35 CM/M2
ECHO LA DIAMETER: 4.8 CM
ECHO LA TO AORTIC ROOT RATIO: 1.3
ECHO LA VOL A-L A2C: 102 ML (ref 18–58)
ECHO LA VOL A-L A4C: 57 ML (ref 18–58)
ECHO LA VOL BP: 74 ML (ref 18–58)
ECHO LA VOL MOD A2C: 101 ML (ref 18–58)
ECHO LA VOL MOD A4C: 54 ML (ref 18–58)
ECHO LA VOL/BSA BIPLANE: 36 ML/M2 (ref 16–34)
ECHO LA VOLUME AREA LENGTH: 78 ML
ECHO LA VOLUME INDEX A-L A2C: 50 ML/M2 (ref 16–34)
ECHO LA VOLUME INDEX A-L A4C: 28 ML/M2 (ref 16–34)
ECHO LA VOLUME INDEX AREA LENGTH: 38 ML/M2 (ref 16–34)
ECHO LA VOLUME INDEX MOD A2C: 50 ML/M2 (ref 16–34)
ECHO LA VOLUME INDEX MOD A4C: 26 ML/M2 (ref 16–34)
ECHO LV E' LATERAL VELOCITY: 4 CM/S
ECHO LV E' SEPTAL VELOCITY: 4 CM/S
ECHO LV EDV A2C: 133 ML
ECHO LV EDV A4C: 117 ML
ECHO LV EDV BP: 129 ML (ref 67–155)
ECHO LV EDV INDEX A4C: 57 ML/M2
ECHO LV EDV INDEX BP: 63 ML/M2
ECHO LV EDV NDEX A2C: 65 ML/M2
ECHO LV EJECTION FRACTION A2C: 38 %
ECHO LV EJECTION FRACTION A4C: 30 %
ECHO LV EJECTION FRACTION BIPLANE: 35 % (ref 55–100)
ECHO LV ESV A2C: 82 ML
ECHO LV ESV A4C: 82 ML
ECHO LV ESV BP: 84 ML (ref 22–58)
ECHO LV ESV INDEX A2C: 40 ML/M2
ECHO LV ESV INDEX A4C: 40 ML/M2
ECHO LV ESV INDEX BP: 41 ML/M2
ECHO LV FRACTIONAL SHORTENING: 12 % (ref 28–44)
ECHO LV INTERNAL DIMENSION DIASTOLE INDEX: 2.84 CM/M2
ECHO LV INTERNAL DIMENSION DIASTOLIC: 5.8 CM (ref 4.2–5.9)
ECHO LV INTERNAL DIMENSION SYSTOLIC INDEX: 2.5 CM/M2
ECHO LV INTERNAL DIMENSION SYSTOLIC: 5.1 CM
ECHO LV IVSD: 1.1 CM (ref 0.6–1)
ECHO LV MASS 2D: 264.3 G (ref 88–224)
ECHO LV MASS INDEX 2D: 129.5 G/M2 (ref 49–115)
ECHO LV POSTERIOR WALL DIASTOLIC: 1.1 CM (ref 0.6–1)
ECHO LV RELATIVE WALL THICKNESS RATIO: 0.38
ECHO LVOT AREA: 4.5 CM2
ECHO LVOT AV VTI INDEX: 0.2
ECHO LVOT DIAM: 2.4 CM
ECHO LVOT MEAN GRADIENT: 1 MMHG
ECHO LVOT PEAK GRADIENT: 1 MMHG
ECHO LVOT PEAK VELOCITY: 0.6 M/S
ECHO LVOT STROKE VOLUME INDEX: 21.1 ML/M2
ECHO LVOT SV: 43 ML
ECHO LVOT VTI: 9.5 CM
ECHO MV A VELOCITY: 0.53 M/S
ECHO MV E DECELERATION TIME (DT): 157.5 MS
ECHO MV E VELOCITY: 0.33 M/S
ECHO MV E/A RATIO: 0.62
ECHO MV E/E' LATERAL: 8.25
ECHO MV E/E' RATIO (AVERAGED): 8.25
ECHO PV MAX VELOCITY: 1.1 M/S
ECHO PV PEAK GRADIENT: 5 MMHG
ECHO RIGHT VENTRICULAR SYSTOLIC PRESSURE (RVSP): 35 MMHG
ECHO RV TAPSE: 0.8 CM (ref 1.7–?)
ECHO TV REGURGITANT MAX VELOCITY: 2.81 M/S
ECHO TV REGURGITANT PEAK GRADIENT: 32 MMHG

## 2024-02-28 NOTE — TELEPHONE ENCOUNTER
----- Message from Aakash Murcia III, MD sent at 2/28/2024  9:10 AM EST -----  Echo, heart muscle still weak. Looks like chery ordered, but for some reason they gave it to me and I didn't realize it at the time. Chery may have additional comments, though I am not sure

## 2024-02-29 ENCOUNTER — PREP FOR PROCEDURE (OUTPATIENT)
Age: 79
End: 2024-02-29

## 2024-02-29 RX ORDER — CHLORHEXIDINE GLUCONATE 213 G/1000ML
SOLUTION TOPICAL
Qty: 1 EACH | Refills: 0 | Status: SHIPPED | OUTPATIENT
Start: 2024-02-29 | End: 2024-03-14

## 2024-02-29 NOTE — TELEPHONE ENCOUNTER
Verified patient with two types of identifiers. Spoke with wife Kirstin, verified with PHI.  Wife agreeable to schedule BIV ICD for 3/8 at 1030 AM at [place].  Will send [employer] message with pre procedure instructions.  Patient verbalized understanding and will call with any other questions.

## 2024-03-03 RX ORDER — SODIUM CHLORIDE 0.9 % (FLUSH) 0.9 %
5-40 SYRINGE (ML) INJECTION PRN
Status: CANCELLED | OUTPATIENT
Start: 2024-03-03

## 2024-03-05 DIAGNOSIS — I25.10 CORONARY ARTERY DISEASE INVOLVING NATIVE CORONARY ARTERY OF NATIVE HEART WITHOUT ANGINA PECTORIS: ICD-10-CM

## 2024-03-05 DIAGNOSIS — I50.22 CHRONIC SYSTOLIC HEART FAILURE (HCC): ICD-10-CM

## 2024-03-05 DIAGNOSIS — I42.0 DILATED CARDIOMYOPATHY (HCC): ICD-10-CM

## 2024-03-05 DIAGNOSIS — I25.5 ISCHEMIC CARDIOMYOPATHY: ICD-10-CM

## 2024-03-05 LAB
ANION GAP SERPL CALC-SCNC: 4 MMOL/L (ref 5–15)
BUN SERPL-MCNC: 21 MG/DL (ref 6–20)
BUN/CREAT SERPL: 18 (ref 12–20)
CALCIUM SERPL-MCNC: 9.4 MG/DL (ref 8.5–10.1)
CHLORIDE SERPL-SCNC: 105 MMOL/L (ref 97–108)
CO2 SERPL-SCNC: 28 MMOL/L (ref 21–32)
CREAT SERPL-MCNC: 1.18 MG/DL (ref 0.7–1.3)
ERYTHROCYTE [DISTWIDTH] IN BLOOD BY AUTOMATED COUNT: 13.3 % (ref 11.5–14.5)
GLUCOSE SERPL-MCNC: 129 MG/DL (ref 65–100)
HCT VFR BLD AUTO: 42.8 % (ref 36.6–50.3)
HGB BLD-MCNC: 14.6 G/DL (ref 12.1–17)
MCH RBC QN AUTO: 30.4 PG (ref 26–34)
MCHC RBC AUTO-ENTMCNC: 34.1 G/DL (ref 30–36.5)
MCV RBC AUTO: 89.2 FL (ref 80–99)
NRBC # BLD: 0 K/UL (ref 0–0.01)
NRBC BLD-RTO: 0 PER 100 WBC
PLATELET # BLD AUTO: 100 K/UL (ref 150–400)
PMV BLD AUTO: 10.8 FL (ref 8.9–12.9)
POTASSIUM SERPL-SCNC: 4.3 MMOL/L (ref 3.5–5.1)
RBC # BLD AUTO: 4.8 M/UL (ref 4.1–5.7)
SODIUM SERPL-SCNC: 137 MMOL/L (ref 136–145)
WBC # BLD AUTO: 6 K/UL (ref 4.1–11.1)

## 2024-03-08 ENCOUNTER — APPOINTMENT (OUTPATIENT)
Facility: HOSPITAL | Age: 79
End: 2024-03-08
Attending: INTERNAL MEDICINE
Payer: MEDICARE

## 2024-03-08 ENCOUNTER — HOSPITAL ENCOUNTER (OUTPATIENT)
Facility: HOSPITAL | Age: 79
Discharge: HOME | End: 2024-03-08
Attending: INTERNAL MEDICINE | Admitting: INTERNAL MEDICINE
Payer: MEDICARE

## 2024-03-08 VITALS
BODY MASS INDEX: 25.9 KG/M2 | SYSTOLIC BLOOD PRESSURE: 116 MMHG | RESPIRATION RATE: 22 BRPM | HEIGHT: 71 IN | DIASTOLIC BLOOD PRESSURE: 81 MMHG | TEMPERATURE: 97.5 F | OXYGEN SATURATION: 93 % | WEIGHT: 185 LBS | HEART RATE: 69 BPM

## 2024-03-08 DIAGNOSIS — I50.22 CHRONIC SYSTOLIC HEART FAILURE (HCC): ICD-10-CM

## 2024-03-08 PROBLEM — Z95.810 BIVENTRICULAR ICD (IMPLANTABLE CARDIOVERTER-DEFIBRILLATOR) IN PLACE: Status: ACTIVE | Noted: 2024-03-08

## 2024-03-08 LAB — ECHO BSA: 2.05 M2

## 2024-03-08 PROCEDURE — 99152 MOD SED SAME PHYS/QHP 5/>YRS: CPT | Performed by: INTERNAL MEDICINE

## 2024-03-08 PROCEDURE — 6360000002 HC RX W HCPCS: Performed by: INTERNAL MEDICINE

## 2024-03-08 PROCEDURE — C1725 CATH, TRANSLUMIN NON-LASER: HCPCS | Performed by: INTERNAL MEDICINE

## 2024-03-08 PROCEDURE — 71045 X-RAY EXAM CHEST 1 VIEW: CPT

## 2024-03-08 PROCEDURE — C1900 LEAD, CORONARY VENOUS: HCPCS | Performed by: INTERNAL MEDICINE

## 2024-03-08 PROCEDURE — C1898 LEAD, PMKR, OTHER THAN TRANS: HCPCS | Performed by: INTERNAL MEDICINE

## 2024-03-08 PROCEDURE — 2709999900 HC NON-CHARGEABLE SUPPLY: Performed by: INTERNAL MEDICINE

## 2024-03-08 PROCEDURE — 2720000010 HC SURG SUPPLY STERILE: Performed by: INTERNAL MEDICINE

## 2024-03-08 PROCEDURE — 33225 L VENTRIC PACING LEAD ADD-ON: CPT | Performed by: INTERNAL MEDICINE

## 2024-03-08 PROCEDURE — 99153 MOD SED SAME PHYS/QHP EA: CPT | Performed by: INTERNAL MEDICINE

## 2024-03-08 PROCEDURE — C1730 CATH, EP, 19 OR FEW ELECT: HCPCS | Performed by: INTERNAL MEDICINE

## 2024-03-08 PROCEDURE — 6360000004 HC RX CONTRAST MEDICATION: Performed by: INTERNAL MEDICINE

## 2024-03-08 PROCEDURE — C1894 INTRO/SHEATH, NON-LASER: HCPCS | Performed by: INTERNAL MEDICINE

## 2024-03-08 PROCEDURE — C1769 GUIDE WIRE: HCPCS | Performed by: INTERNAL MEDICINE

## 2024-03-08 PROCEDURE — C1889 IMPLANT/INSERT DEVICE, NOC: HCPCS | Performed by: INTERNAL MEDICINE

## 2024-03-08 PROCEDURE — 2500000003 HC RX 250 WO HCPCS: Performed by: INTERNAL MEDICINE

## 2024-03-08 PROCEDURE — C1777 LEAD, AICD, ENDO SINGLE COIL: HCPCS | Performed by: INTERNAL MEDICINE

## 2024-03-08 PROCEDURE — 6370000000 HC RX 637 (ALT 250 FOR IP): Performed by: NURSE PRACTITIONER

## 2024-03-08 PROCEDURE — C1892 INTRO/SHEATH,FIXED,PEEL-AWAY: HCPCS | Performed by: INTERNAL MEDICINE

## 2024-03-08 DEVICE — LEAD 429888 MRI CANT US
Type: IMPLANTABLE DEVICE | Status: FUNCTIONAL
Brand: ATTAIN PERFORMA™ MRI SURESCAN™

## 2024-03-08 DEVICE — CRTD DTPA2QQ COBALT XT HF QUAD MRI DF4
Type: IMPLANTABLE DEVICE | Status: FUNCTIONAL
Brand: COBALT™ XT HF QUAD CRT-D MRI SURESCAN™

## 2024-03-08 DEVICE — LEAD 6935M62 QUATTRO SECURE S MRI US
Type: IMPLANTABLE DEVICE | Status: FUNCTIONAL
Brand: SPRINT QUATTRO SECURE S MRI™ SURESCAN™

## 2024-03-08 DEVICE — LEAD 5076-52 MRI US RCMCRD
Type: IMPLANTABLE DEVICE | Status: FUNCTIONAL
Brand: CAPSUREFIX NOVUS MRI™ SURESCAN®

## 2024-03-08 DEVICE — ENVELOPE CMRM6133 ABSORB LRG MR
Type: IMPLANTABLE DEVICE | Status: FUNCTIONAL
Brand: TYRX™

## 2024-03-08 RX ORDER — MIDAZOLAM HYDROCHLORIDE 1 MG/ML
INJECTION INTRAMUSCULAR; INTRAVENOUS PRN
Status: DISCONTINUED | OUTPATIENT
Start: 2024-03-08 | End: 2024-03-08 | Stop reason: HOSPADM

## 2024-03-08 RX ORDER — FENTANYL CITRATE 50 UG/ML
INJECTION, SOLUTION INTRAMUSCULAR; INTRAVENOUS PRN
Status: DISCONTINUED | OUTPATIENT
Start: 2024-03-08 | End: 2024-03-08 | Stop reason: HOSPADM

## 2024-03-08 RX ORDER — METOPROLOL SUCCINATE 25 MG/1
25 TABLET, EXTENDED RELEASE ORAL DAILY
Qty: 30 TABLET | Refills: 3 | Status: SHIPPED | OUTPATIENT
Start: 2024-03-08

## 2024-03-08 RX ORDER — SODIUM CHLORIDE 0.9 % (FLUSH) 0.9 %
5-40 SYRINGE (ML) INJECTION PRN
Status: DISCONTINUED | OUTPATIENT
Start: 2024-03-08 | End: 2024-03-08 | Stop reason: HOSPADM

## 2024-03-08 RX ORDER — ONDANSETRON 2 MG/ML
4 INJECTION INTRAMUSCULAR; INTRAVENOUS EVERY 6 HOURS PRN
Status: DISCONTINUED | OUTPATIENT
Start: 2024-03-08 | End: 2024-03-08 | Stop reason: HOSPADM

## 2024-03-08 RX ORDER — ROSUVASTATIN CALCIUM 10 MG/1
10 TABLET, COATED ORAL DAILY
COMMUNITY

## 2024-03-08 RX ORDER — ACETAMINOPHEN 325 MG/1
650 TABLET ORAL EVERY 4 HOURS PRN
Status: DISCONTINUED | OUTPATIENT
Start: 2024-03-08 | End: 2024-03-08 | Stop reason: HOSPADM

## 2024-03-08 RX ORDER — CEFAZOLIN SODIUM 1 G/3ML
INJECTION, POWDER, FOR SOLUTION INTRAMUSCULAR; INTRAVENOUS PRN
Status: DISCONTINUED | OUTPATIENT
Start: 2024-03-08 | End: 2024-03-08 | Stop reason: HOSPADM

## 2024-03-08 RX ADMIN — ACETAMINOPHEN 650 MG: 325 TABLET ORAL at 13:53

## 2024-03-08 ASSESSMENT — PAIN DESCRIPTION - ORIENTATION: ORIENTATION: LEFT

## 2024-03-08 ASSESSMENT — PAIN SCALES - GENERAL: PAINLEVEL_OUTOF10: 3

## 2024-03-08 ASSESSMENT — PAIN DESCRIPTION - DESCRIPTORS: DESCRIPTORS: DULL

## 2024-03-08 ASSESSMENT — PAIN DESCRIPTION - LOCATION: LOCATION: CHEST

## 2024-03-08 NOTE — PROGRESS NOTES
EP/Cath LAB to Recovery Room Report    Procedure: Biventricular ICD    MD: ALIE Parmar MD    Verbal Report given to Recovery Nurse on patient being transferred to Recovery Room for routine post-op. Patient stable upon transfer to .  Pt had IV conscious sedation with procedural RN. Vitals, mental status, MAR, procedural summary discussed with recovery RN.     Conscious sedation medication given by Procedural RN:  Versed 5 mg  Fentanyl 100 mcg    Procedural Site:    Incision site made to left chest.        Pacemaker Mode set to DDDR .

## 2024-03-08 NOTE — PROCEDURES
Cardiac Procedure Note   Patient: Aakash Faust  MRN: [id number]  SSN: [id number]   YOB: 1945 Age: 78 y.o.  Sex: male    Date of Procedure: 3/8/2024   Pre-procedure Diagnosis:   Ischemic Cardiomyopathy LVEF 35% with . First degree A-V block Right bundle branch block with left anterior fascicular block- NYHA class 2  Unable to tolerate GDMT due to hypotension.  He is planned for TAVR and is at risk for complete AV block and would need pacing so overall he meets indication for  CRT-D implant for primary prevention   Shared decision making was utilized between the physician/provider and patient/family in regards to ICD generator implantation and therapy.      Post-procedure Diagnosis: same  Procedure: ICD Insertion and BIV Insertion  :  Dr. Ernst Parmar MD    Assistant(s):  None  Anesthesia: Moderate Sedation   Estimated Blood Loss: Less than 10 mL   Specimens Removed: None  Findings:   Left axillary venogram, high course under left clavicle  3 accesses   RA anterior wall lead  RV apical lead  LV in lateral branch  Complications: None   Implants: Medtronic biventricular AICD  Signed by:  Ernst Parmar MD  3/8/2024  12:37 PM

## 2024-03-08 NOTE — PROGRESS NOTES
Cardiac Cath Lab Recovery Arrival Note:      Aakash Faust arrived to Cardiac Cath Lab, Recovery Area. Staff introduced to patient. Patient identifiers verified with NAME and DATE OF BIRTH. Procedure verified with patient. Consent forms reviewed and signed by patient or authorized representative and verified. Allergies verified.     Patient and family oriented to department. Patient and family informed of procedure and plan of care.     Questions answered with review. Patient prepped for procedure, per orders from physician, prior to arrival.    Patient on cardiac monitor, non-invasive blood pressure, SPO2 monitor. On Room Air. Patient is A&Ox 4. Patient reports No Pain.     Patient in stretcher, in low position, with side rails up, call bell within reach, patient instructed to call if assistance as needed.    Patient prep in: [facility] Recovery Area, [place] 1 FT.   Patient family : Kirstin / wife  Family in: Waiting Room .   Prep by: Usha Cardona RN

## 2024-03-08 NOTE — DISCHARGE INSTRUCTIONS
Patient Instructions Post-BIV ICD Implant    Added toprol daily, sent to pharmacy    1.  No heavy lifting or exercises with the left arm for 4 weeks.  This includes the following:  Do not raise arm above the shoulder level to comb hair, pull on clothes, etc...  Do not use the affected arm to pull up or push up from a seated or laying  down position.  Do not allow anyone else to pull on the affected arm.    2.  With the Aquacel dressing in place, you may shower.    3.  You may remove your Aquacel chest dressing in 1 week, or it can be removed in clinic at follow up if you prefer.      4.  Do not rub or massage your ICD site.    5.  Do not drive for 3 days.    6.  Call Dr. Parmar at [phone] if you experience any of the following symptoms:  Redness at the ICD site  Swelling at or around the ICD or in the left arm  Pain around the ICD  Dizziness, lightheadedness, fainting spells  Lack of energy  Shortness of breath  Rapid heart rate  Chest or muscle twitches    7.  Call Dr. Parmar at [phone] if your ICD delivers a shock.  The physician may   or may not want to see you in the office (that decision will be made when you call).    8.  Follow-up with Dr. Parmar's office for wound check on 03/18/2024 at 10 AM.    Future Appointments   Date Time Provider Department Center   3/18/2024 10:00 AM [email or web address], [facility]   3/18/2024 10:00 AM WOUND CHECKS, [facility] AM[facility]   4/22/2024 11:00 AM Aakash Murcia III, MD [facility] Matthew M. Ngo, M.D. [facility]  Electrophysiology/Cardiology  [facility] Cardiology  [address]                      [address] [address]  [place] [address]                             [facility] [address] [phone]

## 2024-03-08 NOTE — INTERVAL H&P NOTE
Update History & Physical    The patient's History and Physical of February 22, 2024 was reviewed with the patient and I examined the patient. There was no change. The surgical site was confirmed by the patient and me.     Plan: The risks, benefits, expected outcome, and alternative to the recommended procedure have been discussed with the patient. Patient understands and wants to proceed with the procedure.     Electronically signed by Ernst Parmar MD on 3/8/2024 at 8:31 AM

## 2024-03-08 NOTE — PROGRESS NOTES
1207-TRANSFER - IN Cath Lab RR REPORT:    Verbal report received from Meir on Aakash Faust  being received from the EP Lab for routine progression of care. Report consisted of patient’s Situation, Background, Assessment and Recommendations(SBAR). Procedural summary and MAR reviewed with receiving nurse. Opportunity for questions and clarification was provided. Assessment completed upon patient’s arrival to Cardiac Cath Lab RECOVERY AREA and care assumed.       Cardiac Cath Lab Recovery Arrival Note:    Aakash Faust arrived to [facility] recovery area.  Patient procedure= BI V pacemaker. Patient on cardiac monitor, non-invasive blood pressure, SPO2 monitor. On RA.  IV  of NS on pump at 75 ml/hr. Patient is A&Ox 3. Patient reports no CP SOB.    PROCEDURE SITE CHECK:    Procedure site: No bleeding and no hematoma, no pain/discomfort reported at procedure site.     No change in patient status. Continue to monitor patient and status.     1405- CXR results reported to Dr Parmar. MD grayed pt to be d/c'd to home.     1415- Discharge note-     Ambulated patient to the bathroom with a steady gait, voided without difficulty. Patient denies chest pain, shortness of breath, or dizziness. [facility] Vital signs stable.     Procedural site is clean, dry, and intact. No bleeding, no hematoma.     Assisted patient in dressing    Educated patient about their sedation precautions such as not driving, operating any machinery, or signing legal documents 24 hours post procedure.     Reviewed discharge instructions, including medications and site care using the teach back method.  Answered all questions. Verbalized understanding.     Removed peripheral IV.    Escorted to discharge area in a wheelchair with all of their belongings including pacemaker monitor    Patient's spouse present to take patient home. Reviewed discharge instructions with patients' spouse, they verbalized understanding.

## 2024-03-08 NOTE — PROGRESS NOTES
Cardiac Cath Lab Procedure Area Arrival Note:    Aakash Faust arrived to Cardiac Cath Lab, Procedure Area. Patient identifiers verified with NAME and DATE OF BIRTH. Procedure verified with patient. Consent forms verified. Allergies verified. Patient informed of procedure and plan of care. Questions answered with review. Patient voiced understanding of procedure and plan of care.    Patient on cardiac monitor, non-invasive blood pressure, SPO2 monitor. On stretcher to room 3 for a Bi V ICD under conscious sedation. placed on O2 @ 3 lpm via NC.  IV of  ml on pump at 50 ml/hr. Patient status doing well without problems. Patient is A&Ox 4. Patient reports no complaints.     Patient medicated during procedure with orders obtained and verified by Dr. Parmar.    Refer to patients Cardiac Cath Lab PROCEDURE REPORT for vital signs, assessment, status, and response during procedure, printed at end of case. Printed report on chart or scanned into chart.

## 2024-03-18 ENCOUNTER — PROCEDURE VISIT (OUTPATIENT)
Age: 79
End: 2024-03-18

## 2024-03-18 ENCOUNTER — CLINICAL DOCUMENTATION (OUTPATIENT)
Age: 79
End: 2024-03-18

## 2024-03-18 DIAGNOSIS — Z95.810 PRESENCE OF BIVENTRICULAR AUTOMATIC CARDIOVERTER/DEFIBRILLATOR (AICD): Primary | ICD-10-CM

## 2024-03-18 NOTE — PROGRESS NOTES
Patient presents for wound check post-device implantation. The dressing was removed and the site was inspected. The site appeared to be well-healing without ecchymosis/tenderness/erythema. Denies pain, fevers, discharge.           Future Appointments   Date Time Provider Department Center   4/3/2024 10:20 AM Neymar Jerome MD [facility]   4/22/2024 11:00 AM Aakash Murcia III, MD [facility]   6/17/2024  2:00 PM [id number]   6/17/2024  2:20 PM Little Blackburn, APRN - NP [facility]         Continue follow up in device clinic as planned.

## 2024-04-03 ENCOUNTER — TELEPHONE (OUTPATIENT)
Age: 79
End: 2024-04-03

## 2024-04-03 ENCOUNTER — OFFICE VISIT (OUTPATIENT)
Age: 79
End: 2024-04-03
Payer: MEDICARE

## 2024-04-03 VITALS
DIASTOLIC BLOOD PRESSURE: 82 MMHG | HEIGHT: 71 IN | OXYGEN SATURATION: 97 % | WEIGHT: 190.2 LBS | BODY MASS INDEX: 26.63 KG/M2 | HEART RATE: 80 BPM | SYSTOLIC BLOOD PRESSURE: 118 MMHG

## 2024-04-03 DIAGNOSIS — I35.0 NONRHEUMATIC AORTIC VALVE STENOSIS: Primary | ICD-10-CM

## 2024-04-03 DIAGNOSIS — I35.0 AORTIC VALVE STENOSIS, ETIOLOGY OF CARDIAC VALVE DISEASE UNSPECIFIED: ICD-10-CM

## 2024-04-03 DIAGNOSIS — Z95.810 PRESENCE OF BIVENTRICULAR AUTOMATIC CARDIOVERTER/DEFIBRILLATOR (AICD): ICD-10-CM

## 2024-04-03 DIAGNOSIS — I42.9 CARDIOMYOPATHY, UNSPECIFIED TYPE (HCC): Primary | ICD-10-CM

## 2024-04-03 PROCEDURE — G8419 CALC BMI OUT NRM PARAM NOF/U: HCPCS | Performed by: INTERNAL MEDICINE

## 2024-04-03 PROCEDURE — 99214 OFFICE O/P EST MOD 30 MIN: CPT | Performed by: INTERNAL MEDICINE

## 2024-04-03 PROCEDURE — 1036F TOBACCO NON-USER: CPT | Performed by: INTERNAL MEDICINE

## 2024-04-03 PROCEDURE — 1123F ACP DISCUSS/DSCN MKR DOCD: CPT | Performed by: INTERNAL MEDICINE

## 2024-04-03 PROCEDURE — G8427 DOCREV CUR MEDS BY ELIG CLIN: HCPCS | Performed by: INTERNAL MEDICINE

## 2024-04-03 ASSESSMENT — PATIENT HEALTH QUESTIONNAIRE - PHQ9
2. FEELING DOWN, DEPRESSED OR HOPELESS: NOT AT ALL
SUM OF ALL RESPONSES TO PHQ QUESTIONS 1-9: 0
SUM OF ALL RESPONSES TO PHQ QUESTIONS 1-9: 0
SUM OF ALL RESPONSES TO PHQ9 QUESTIONS 1 & 2: 0
SUM OF ALL RESPONSES TO PHQ QUESTIONS 1-9: 0
SUM OF ALL RESPONSES TO PHQ QUESTIONS 1-9: 0
1. LITTLE INTEREST OR PLEASURE IN DOING THINGS: NOT AT ALL

## 2024-04-03 NOTE — TELEPHONE ENCOUNTER
Spoke to Mrs. Faust to schedule CTA at [facility] on 04/25/2024 at 8:00 am with a 0730 arrival. Follow up appointment in the valve clinic will be on 05/02/2024 at 2:00 pm.

## 2024-04-03 NOTE — PROGRESS NOTES
Dr. Justus Samaniego [facility] Cardiology.  [phone]            Cardiology structural heart disease consult/Progress Note      Requesting/referring provider: [facility]    Reason for Consult: Aortic valve disease    Assessment/Plan:  1.  Moderate to severe possibly aortic stenosis with recent invasive assessment suggesting classic low-flow low gradient severe aortic stenosis  2.  Coronary disease status post prior coronary bypass grafting  3.  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  Frequent PVCs  5.  Forgetfulness/ memory loss/gait disturbances, neurology concerned about her degenerative condition  6.  History of hyperlipidemia, statin discontinued because of forgetfulness  7.  Frequent PVCs on clinical examination        Aakash Faust is seen at the request of Dr. Murcia for evaluating his valvular heart disease.  Clinically he is aortic stenosis appears to be moderate to severe clinically.  However probability of low-flow low gradient severe aortic stenosis cannot be ruled out.  Echocardiogram that was performed recently had shown gradients in mid 20s.  Hence we performed invasive assessment which demonstrated gradients in high 20s with dobutamine demonstrating increased gradients to about 43 mmHg on simultaneous transaortic gradient assessment..  Hence there is a possibility that he may have classic low-flow low gradient severe aortic stenosis.    There is also a possibility that he may have symptoms from conduction disease and hence he underwent dual-chamber pacemaker placement without any significant improvement.    Finally he has PVCs as a potential contributor for his tiredness and fatigue.  He is being followed by electrophysiology team who may decide if any intervention is needed.      I also informed the wife who is being a nurse that his overall slowness may be as likely related to

## 2024-04-08 ENCOUNTER — PATIENT MESSAGE (OUTPATIENT)
Age: 79
End: 2024-04-08

## 2024-04-24 ENCOUNTER — ANCILLARY PROCEDURE (OUTPATIENT)
Age: 79
End: 2024-04-24
Payer: MEDICARE

## 2024-04-24 VITALS
SYSTOLIC BLOOD PRESSURE: 118 MMHG | HEIGHT: 71 IN | DIASTOLIC BLOOD PRESSURE: 82 MMHG | BODY MASS INDEX: 26.6 KG/M2 | WEIGHT: 190 LBS

## 2024-04-24 DIAGNOSIS — I42.9 CARDIOMYOPATHY, UNSPECIFIED TYPE (HCC): ICD-10-CM

## 2024-04-24 DIAGNOSIS — I35.0 AORTIC VALVE STENOSIS, ETIOLOGY OF CARDIAC VALVE DISEASE UNSPECIFIED: ICD-10-CM

## 2024-04-24 LAB
ECHO AO ASC DIAM: 3.6 CM
ECHO AO ASCENDING AORTA INDEX: 1.75 CM/M2
ECHO AO ROOT DIAM: 3.5 CM
ECHO AO ROOT INDEX: 1.7 CM/M2
ECHO AR MAX VEL PISA: 4.1 M/S
ECHO AV AREA PEAK VELOCITY: 0.8 CM2
ECHO AV AREA VTI: 0.9 CM2
ECHO AV AREA/BSA PEAK VELOCITY: 0.4 CM2/M2
ECHO AV AREA/BSA VTI: 0.4 CM2/M2
ECHO AV MEAN GRADIENT: 23 MMHG
ECHO AV MEAN VELOCITY: 2.2 M/S
ECHO AV PEAK GRADIENT: 43 MMHG
ECHO AV PEAK VELOCITY: 3.3 M/S
ECHO AV REGURGITANT PHT: 366.5 MILLISECOND
ECHO AV VELOCITY RATIO: 0.18
ECHO AV VTI: 73.2 CM
ECHO BSA: 2.08 M2
ECHO EST RA PRESSURE: 3 MMHG
ECHO LA DIAMETER INDEX: 2.48 CM/M2
ECHO LA DIAMETER: 5.1 CM
ECHO LA TO AORTIC ROOT RATIO: 1.46
ECHO LA VOL A-L A2C: 134 ML (ref 18–58)
ECHO LA VOL A-L A4C: 114 ML (ref 18–58)
ECHO LA VOL BP: 125 ML (ref 18–58)
ECHO LA VOL MOD A2C: 130 ML (ref 18–58)
ECHO LA VOL MOD A4C: 111 ML (ref 18–58)
ECHO LA VOL/BSA BIPLANE: 61 ML/M2 (ref 16–34)
ECHO LA VOLUME AREA LENGTH: 129 ML
ECHO LA VOLUME INDEX A-L A2C: 65 ML/M2 (ref 16–34)
ECHO LA VOLUME INDEX A-L A4C: 55 ML/M2 (ref 16–34)
ECHO LA VOLUME INDEX AREA LENGTH: 63 ML/M2 (ref 16–34)
ECHO LA VOLUME INDEX MOD A2C: 63 ML/M2 (ref 16–34)
ECHO LA VOLUME INDEX MOD A4C: 54 ML/M2 (ref 16–34)
ECHO LV E' LATERAL VELOCITY: 12 CM/S
ECHO LV E' SEPTAL VELOCITY: 7 CM/S
ECHO LV EDV A2C: 128 ML
ECHO LV EDV A4C: 129 ML
ECHO LV EDV BP: 131 ML (ref 67–155)
ECHO LV EDV INDEX A4C: 63 ML/M2
ECHO LV EDV INDEX BP: 64 ML/M2
ECHO LV EDV NDEX A2C: 62 ML/M2
ECHO LV EJECTION FRACTION A2C: 24 %
ECHO LV EJECTION FRACTION A4C: 33 %
ECHO LV EJECTION FRACTION BIPLANE: 30 % (ref 55–100)
ECHO LV ESV A2C: 97 ML
ECHO LV ESV A4C: 86 ML
ECHO LV ESV BP: 92 ML (ref 22–58)
ECHO LV ESV INDEX A2C: 47 ML/M2
ECHO LV ESV INDEX A4C: 42 ML/M2
ECHO LV ESV INDEX BP: 45 ML/M2
ECHO LV FRACTIONAL SHORTENING: 14 % (ref 28–44)
ECHO LV INTERNAL DIMENSION DIASTOLE INDEX: 2.77 CM/M2
ECHO LV INTERNAL DIMENSION DIASTOLIC: 5.7 CM (ref 4.2–5.9)
ECHO LV INTERNAL DIMENSION SYSTOLIC INDEX: 2.38 CM/M2
ECHO LV INTERNAL DIMENSION SYSTOLIC: 4.9 CM
ECHO LV IVSD: 1.1 CM (ref 0.6–1)
ECHO LV MASS 2D: 256.7 G (ref 88–224)
ECHO LV MASS INDEX 2D: 124.6 G/M2 (ref 49–115)
ECHO LV POSTERIOR WALL DIASTOLIC: 1.1 CM (ref 0.6–1)
ECHO LV RELATIVE WALL THICKNESS RATIO: 0.39
ECHO LVOT AREA: 4.5 CM2
ECHO LVOT AV VTI INDEX: 0.19
ECHO LVOT DIAM: 2.4 CM
ECHO LVOT MEAN GRADIENT: 1 MMHG
ECHO LVOT PEAK GRADIENT: 1 MMHG
ECHO LVOT PEAK VELOCITY: 0.6 M/S
ECHO LVOT STROKE VOLUME INDEX: 30.5 ML/M2
ECHO LVOT SV: 62.9 ML
ECHO LVOT VTI: 13.9 CM
ECHO MV A VELOCITY: 0.32 M/S
ECHO MV E DECELERATION TIME (DT): 226.7 MS
ECHO MV E VELOCITY: 0.6 M/S
ECHO MV E/A RATIO: 1.88
ECHO MV E/E' LATERAL: 5
ECHO MV E/E' RATIO (AVERAGED): 6.79
ECHO RIGHT VENTRICULAR SYSTOLIC PRESSURE (RVSP): 38 MMHG
ECHO RV BASAL DIMENSION: 4.2 CM
ECHO RV FREE WALL PEAK S': 9 CM/S
ECHO RV TAPSE: 1.6 CM (ref 1.7–?)
ECHO TV REGURGITANT MAX VELOCITY: 2.97 M/S
ECHO TV REGURGITANT PEAK GRADIENT: 35 MMHG

## 2024-04-24 PROCEDURE — 93306 TTE W/DOPPLER COMPLETE: CPT | Performed by: INTERNAL MEDICINE

## 2024-04-25 ENCOUNTER — HOSPITAL ENCOUNTER (OUTPATIENT)
Facility: HOSPITAL | Age: 79
Discharge: HOME | End: 2024-04-25
Payer: MEDICARE

## 2024-04-25 DIAGNOSIS — I35.0 NONRHEUMATIC AORTIC VALVE STENOSIS: ICD-10-CM

## 2024-04-25 PROCEDURE — 74174 CTA ABD&PLVS W/CONTRAST: CPT

## 2024-04-25 PROCEDURE — 6360000004 HC RX CONTRAST MEDICATION: Performed by: RADIOLOGY

## 2024-04-25 RX ADMIN — IOPAMIDOL 100 ML: 755 INJECTION, SOLUTION INTRAVENOUS at 07:42

## 2024-04-26 NOTE — PROGRESS NOTES
Patient: Aakash Faust   Age: 78 y.o.     Patient Care Team:  [facility] as PCP - Neymar Redman MD as Consulting Physician (Cardiology)  Raghav Dukes MD as Consulting Physician (Cardiothoracic Surgery)    PCP: [facility]    Cardiologist: Justus    Diagnosis/Reason for Consultation: The encounter diagnosis was Aortic valve stenosis, etiology of cardiac valve disease unspecified.    Problem List:   Patient Active Problem List   Diagnosis    Coronary atherosclerosis    Mixed hyperlipidemia    Aortic stenosis    Cardiomyopathy (HCC)    Chronic systolic heart failure (HCC)    Biventricular ICD (implantable cardioverter-defibrillator) in place         HPI: 78 y.o. male with PMHx of AS, CAD s/p CABG, chronic systolic HF s/p BiV ICD, dementia that is [facility] by Dr. Jerome for interventional evaluation of severe AS.    Main complaint that prompted diagnosis is fatigue- wife notes that he has been laying down around the house most of the time, whereas he used to be very active (jogger, played tennis) up to a few years ago. He also occasionally gets dizzy with standing and SOB with exertion. Denies CP and LE edema    Past medical/surgical history positive for dysrhythmias (RBBB), lung disease (seasonal asthma- no pulmonologist), previous thoracic surgery, trauma or radiation to chest wall (CABG 15 years ago), prostate disease (prostate cancer being monitored by Dr. Garcia at [facility]- last PSA was normal), and prior PPM (BiV ICD).  PM/SH negative for CVA/TIA, JARAD , thyroid disease, prior PNA, diabetes, kidney disease, liver disease, PAD/PVD , and issues with bleeding or blood clots. Wife notes that he has recently been worked up for thrombocytopenia but they didn't find anything and they subsequently cut back his ASA to three times a week.     Social history positive for tobacco use (smoked 1-2 PPD x 14 years; quit 43

## 2024-04-29 NOTE — TELEPHONE ENCOUNTER
[id number] [address] for procedure and can hold aspirin for 7days Patient called for the status of the letter

## 2024-05-02 ENCOUNTER — INITIAL CONSULT (OUTPATIENT)
Age: 79
End: 2024-05-02

## 2024-05-02 VITALS
SYSTOLIC BLOOD PRESSURE: 117 MMHG | DIASTOLIC BLOOD PRESSURE: 74 MMHG | HEART RATE: 71 BPM | WEIGHT: 187.5 LBS | BODY MASS INDEX: 26.15 KG/M2 | TEMPERATURE: 97.6 F | OXYGEN SATURATION: 97 %

## 2024-05-02 DIAGNOSIS — I35.0 AORTIC VALVE STENOSIS, ETIOLOGY OF CARDIAC VALVE DISEASE UNSPECIFIED: Primary | ICD-10-CM

## 2024-05-02 PROCEDURE — 1036F TOBACCO NON-USER: CPT | Performed by: THORACIC SURGERY (CARDIOTHORACIC VASCULAR SURGERY)

## 2024-05-03 ENCOUNTER — PREP FOR PROCEDURE (OUTPATIENT)
Age: 79
End: 2024-05-03

## 2024-05-03 ENCOUNTER — PATIENT MESSAGE (OUTPATIENT)
Age: 79
End: 2024-05-03

## 2024-05-03 DIAGNOSIS — I35.0 AORTIC VALVE STENOSIS, ETIOLOGY OF CARDIAC VALVE DISEASE UNSPECIFIED: Primary | ICD-10-CM

## 2024-05-06 DIAGNOSIS — I35.0 AORTIC VALVE STENOSIS, ETIOLOGY OF CARDIAC VALVE DISEASE UNSPECIFIED: Primary | ICD-10-CM

## 2024-05-06 RX ORDER — SODIUM CHLORIDE 0.9 % (FLUSH) 0.9 %
5-40 SYRINGE (ML) INJECTION PRN
Status: CANCELLED | OUTPATIENT
Start: 2024-05-06

## 2024-05-06 RX ORDER — SODIUM CHLORIDE 0.9 % (FLUSH) 0.9 %
5-40 SYRINGE (ML) INJECTION EVERY 12 HOURS SCHEDULED
Status: CANCELLED | OUTPATIENT
Start: 2024-05-06

## 2024-05-06 RX ORDER — SODIUM CHLORIDE 9 MG/ML
INJECTION, SOLUTION INTRAVENOUS PRN
Status: CANCELLED | OUTPATIENT
Start: 2024-05-06

## 2024-05-10 ENCOUNTER — TELEPHONE (OUTPATIENT)
Age: 79
End: 2024-05-10

## 2024-05-10 NOTE — TELEPHONE ENCOUNTER
Spoke to the patient's wife to answer questions regarding scheduling follow up. The patient is going on a trip on 076-07/13th.  We will make sure to schedule his follow up after he gets back from his trip.

## 2024-06-03 RX ORDER — METOPROLOL SUCCINATE 25 MG/1
25 TABLET, EXTENDED RELEASE ORAL DAILY
Qty: 90 TABLET | Refills: 1 | OUTPATIENT
Start: 2024-06-03

## 2024-06-05 ENCOUNTER — OFFICE VISIT (OUTPATIENT)
Age: 79
End: 2024-06-05

## 2024-06-05 ENCOUNTER — HOSPITAL ENCOUNTER (OUTPATIENT)
Facility: HOSPITAL | Age: 79
Discharge: HOME | End: 2024-06-08
Payer: MEDICARE

## 2024-06-05 VITALS
TEMPERATURE: 97.5 F | WEIGHT: 183 LBS | DIASTOLIC BLOOD PRESSURE: 72 MMHG | HEIGHT: 71 IN | BODY MASS INDEX: 25.62 KG/M2 | SYSTOLIC BLOOD PRESSURE: 111 MMHG | HEART RATE: 69 BPM

## 2024-06-05 DIAGNOSIS — I35.0 NONRHEUMATIC AORTIC VALVE STENOSIS: Primary | ICD-10-CM

## 2024-06-05 DIAGNOSIS — I35.0 AORTIC VALVE STENOSIS, ETIOLOGY OF CARDIAC VALVE DISEASE UNSPECIFIED: ICD-10-CM

## 2024-06-05 LAB
ALBUMIN SERPL-MCNC: 3.4 G/DL (ref 3.5–5)
ALBUMIN/GLOB SERPL: 1.1 (ref 1.1–2.2)
ALP SERPL-CCNC: 98 U/L (ref 45–117)
ALT SERPL-CCNC: 15 U/L (ref 12–78)
ANION GAP SERPL CALC-SCNC: 3 MMOL/L (ref 5–15)
APTT PPP: 27.2 SEC (ref 22.1–31)
AST SERPL-CCNC: 11 U/L (ref 15–37)
BASOPHILS # BLD: 0 K/UL (ref 0–0.1)
BASOPHILS NFR BLD: 1 % (ref 0–1)
BILIRUB SERPL-MCNC: 0.7 MG/DL (ref 0.2–1)
BUN SERPL-MCNC: 20 MG/DL (ref 6–20)
BUN/CREAT SERPL: 17 (ref 12–20)
CALCIUM SERPL-MCNC: 9.1 MG/DL (ref 8.5–10.1)
CHLORIDE SERPL-SCNC: 107 MMOL/L (ref 97–108)
CO2 SERPL-SCNC: 29 MMOL/L (ref 21–32)
CREAT SERPL-MCNC: 1.15 MG/DL (ref 0.7–1.3)
DIFFERENTIAL METHOD BLD: ABNORMAL
EKG ATRIAL RATE: 69 BPM
EKG DIAGNOSIS: NORMAL
EKG P AXIS: 73 DEGREES
EKG P-R INTERVAL: 124 MS
EKG Q-T INTERVAL: 470 MS
EKG QRS DURATION: 172 MS
EKG QTC CALCULATION (BAZETT): 503 MS
EKG R AXIS: 236 DEGREES
EKG T AXIS: 45 DEGREES
EKG VENTRICULAR RATE: 69 BPM
EOSINOPHIL # BLD: 0.2 K/UL (ref 0–0.4)
EOSINOPHIL NFR BLD: 4 % (ref 0–7)
ERYTHROCYTE [DISTWIDTH] IN BLOOD BY AUTOMATED COUNT: 13.2 % (ref 11.5–14.5)
EST. AVERAGE GLUCOSE BLD GHB EST-MCNC: 100 MG/DL
GLOBULIN SER CALC-MCNC: 3.2 G/DL (ref 2–4)
GLUCOSE SERPL-MCNC: 103 MG/DL (ref 65–100)
HBA1C MFR BLD: 5.1 % (ref 4–5.6)
HCT VFR BLD AUTO: 39.8 % (ref 36.6–50.3)
HGB BLD-MCNC: 13.4 G/DL (ref 12.1–17)
HISTORY CHECK: NORMAL
IMM GRANULOCYTES # BLD AUTO: 0 K/UL (ref 0–0.04)
IMM GRANULOCYTES NFR BLD AUTO: 0 % (ref 0–0.5)
INR PPP: 1 (ref 0.9–1.1)
LYMPHOCYTES # BLD: 1 K/UL (ref 0.8–3.5)
LYMPHOCYTES NFR BLD: 23 % (ref 12–49)
MAGNESIUM SERPL-MCNC: 2.2 MG/DL (ref 1.6–2.4)
MCH RBC QN AUTO: 30 PG (ref 26–34)
MCHC RBC AUTO-ENTMCNC: 33.7 G/DL (ref 30–36.5)
MCV RBC AUTO: 89.2 FL (ref 80–99)
MONOCYTES # BLD: 0.4 K/UL (ref 0–1)
MONOCYTES NFR BLD: 9 % (ref 5–13)
NEUTS SEG # BLD: 2.8 K/UL (ref 1.8–8)
NEUTS SEG NFR BLD: 63 % (ref 32–75)
NRBC # BLD: 0 K/UL (ref 0–0.01)
NRBC BLD-RTO: 0 PER 100 WBC
PLATELET # BLD AUTO: 120 K/UL (ref 150–400)
PMV BLD AUTO: 10.1 FL (ref 8.9–12.9)
POTASSIUM SERPL-SCNC: 4.3 MMOL/L (ref 3.5–5.1)
PROT SERPL-MCNC: 6.6 G/DL (ref 6.4–8.2)
PROTHROMBIN TIME: 10.9 SEC (ref 9–11.1)
RBC # BLD AUTO: 4.46 M/UL (ref 4.1–5.7)
SODIUM SERPL-SCNC: 139 MMOL/L (ref 136–145)
THERAPEUTIC RANGE: NORMAL SECS (ref 58–77)
TSH SERPL DL<=0.05 MIU/L-ACNC: 1.55 UIU/ML (ref 0.36–3.74)
WBC # BLD AUTO: 4.4 K/UL (ref 4.1–11.1)

## 2024-06-05 PROCEDURE — 86900 BLOOD TYPING SEROLOGIC ABO: CPT

## 2024-06-05 PROCEDURE — 36415 COLL VENOUS BLD VENIPUNCTURE: CPT

## 2024-06-05 PROCEDURE — 85730 THROMBOPLASTIN TIME PARTIAL: CPT

## 2024-06-05 PROCEDURE — 86850 RBC ANTIBODY SCREEN: CPT

## 2024-06-05 PROCEDURE — 84443 ASSAY THYROID STIM HORMONE: CPT

## 2024-06-05 PROCEDURE — 85025 COMPLETE CBC W/AUTO DIFF WBC: CPT

## 2024-06-05 PROCEDURE — 86901 BLOOD TYPING SEROLOGIC RH(D): CPT

## 2024-06-05 PROCEDURE — 83735 ASSAY OF MAGNESIUM: CPT

## 2024-06-05 PROCEDURE — 85610 PROTHROMBIN TIME: CPT

## 2024-06-05 PROCEDURE — 93005 ELECTROCARDIOGRAM TRACING: CPT

## 2024-06-05 PROCEDURE — 71046 X-RAY EXAM CHEST 2 VIEWS: CPT

## 2024-06-05 PROCEDURE — 83036 HEMOGLOBIN GLYCOSYLATED A1C: CPT

## 2024-06-05 PROCEDURE — 80053 COMPREHEN METABOLIC PANEL: CPT

## 2024-06-05 PROCEDURE — APPSS45 APP SPLIT SHARED TIME 31-45 MINUTES: Performed by: NURSE PRACTITIONER

## 2024-06-05 RX ORDER — LANOLIN ALCOHOL/MO/W.PET/CERES
3 CREAM (GRAM) TOPICAL NIGHTLY PRN
COMMUNITY

## 2024-06-05 RX ORDER — MAGNESIUM GLUCONATE 27 MG(500)
400 TABLET ORAL AS NEEDED
COMMUNITY

## 2024-06-05 RX ORDER — ACETAMINOPHEN 325 MG/1
650 TABLET ORAL EVERY 6 HOURS PRN
COMMUNITY

## 2024-06-05 RX ORDER — SODIUM CHLORIDE 9 MG/ML
INJECTION, SOLUTION INTRAVENOUS PRN
Status: DISCONTINUED | OUTPATIENT
Start: 2024-06-05 | End: 2024-06-09 | Stop reason: HOSPADM

## 2024-06-05 NOTE — H&P
Cardiac Surgery   History and Physical    Subjective:      Aakash Faust is a 78 y.o. male with PMHx of AS, CAD s/p CABG, chronic systolic HF s/p BiV ICD, dementia that is [facility] to [facility] by Dr. Jerome for interventional evaluation of severe AS.     Main complaint that prompted diagnosis is fatigue- wife notes that he has been laying down around the house most of the time, whereas he used to be very active (jogger, played tennis) up to a few years ago. He also occasionally gets dizzy with standing and SOB with exertion. Denies CP and LE edema     Past medical/surgical history positive for dysrhythmias (RBBB), lung disease (seasonal asthma- no pulmonologist), previous thoracic surgery, trauma or radiation to chest wall (CABG 15 years ago), prostate disease (prostate cancer being monitored by Dr. Garcia at [facility]- last PSA was normal), and prior PPM (BiV ICD).  PM/SH negative for CVA/TIA, JARAD , thyroid disease, prior PNA, diabetes, kidney disease, liver disease, PAD/PVD , and issues with bleeding or blood clots. Wife notes that he has recently been worked up for thrombocytopenia but they didn't find anything and they subsequently cut back his ASA to three times a week.      Social history positive for tobacco use (smoked 1-2 PPD x 14 years; quit 44 years ago). Denies alcohol use and drug use. Family history positive for CV disease (had a father and a brother who both passed away at 44 from MI. Also has a younger brother that had some cardiac issues in his 30s but is okay now).     He has dementia but able to complete his ADLs. He lives with his wife.      COVID vaccine status: Fully vaccinated.    Cardiac Testing    TTE:  04/24/24     ECHO (TTE) COMPLETE (PRN CONTRAST/BUBBLE/STRAIN/3D) 04/24/2024 12:05 PM (Final)     Interpretation Summary    Left Ventricle: Severely reduced left ventricular systolic function with a visually estimated EF of 25 - 30%. Left ventricle size is normal.  8 oz)   04/24/24 86.2 kg (190 lb)     Temp Readings from Last 3 Encounters:   06/05/24 97.5 °F (36.4 °C) (Oral)   05/02/24 97.6 °F (36.4 °C) (Oral)   03/08/24 97.5 °F (36.4 °C) (Oral)     BP Readings from Last 3 Encounters:   06/05/24 111/72   05/02/24 117/74   04/24/24 118/82     Pulse Readings from Last 3 Encounters:   06/05/24 69   05/02/24 71   04/03/24 80         Physical Exam:    General appearance: alert, cooperative, no distress  Head: normocephalic, without obvious abnormality; atraumatic  Eyes: conjunctivae/corneas clear; EOM's intact.   Nose: nares normal; no drainage.  Neck: no carotid bruit and no JVD  Lungs: clear to auscultation bilaterally  Heart: regular rate and rhythm; 3/6 ABRAN  Abdomen: soft, non-tender; bowel sounds normal  Extremities: moves all extremities; no weakness.  Skin: Skin color normal; No varicose veins or edema.  Neurologic: Grossly normal      Labs:   Recent Labs     06/05/24  0908   WBC 4.4   HGB 13.4   HCT 39.8   *      K 4.3   BUN 20   INR 1.0       Diagnostics:   PA and lateral: Xray Result (most recent):  XR CHEST PORTABLE 03/08/2024    Narrative  EXAM:  XR CHEST PORTABLE    INDICATION: Pacemaker placement and rule out pneumothorax    COMPARISON: February 2011    TECHNIQUE: portable chest AP view    FINDINGS: Heart size is upper normal status post median sternotomy. AICD has  been introduced on the left since the prior study, with coil over the right  ventricle and second lead over the right atrium. The pulmonary vasculature is  within normal limits.    Lung volumes are moderate with minimal bibasilar atelectasis. Possible left  pleural effusion. The visualized bones and upper abdomen are age-appropriate.    Impression  Minimal bibasilar atelectasis, small left pleural effusion is possible.      EKG:   Encounter Date: 06/05/24   EKG 12 lead   Result Value    Ventricular Rate 69    Atrial Rate 69    P-R Interval 124    QRS Duration 172    Q-T Interval 470    QTc

## 2024-06-05 NOTE — PERIOP NOTE
[address] [address]   MAIN OR                                  [phone]    MAIN PRE OP             [phone]                                                                                AMBULATORY PRE OP          [phone]  PRE-ADMISSION TESTING    [phone]     Surgery Date:  6/13/24       Is surgery arrival time given by surgeon?  YES    If “NO”, [facility] staff will call you between 4 and 7pm the day before your surgery with your arrival time. (If your surgery is on a Monday, we will call you the Friday before.)    Call [phone] after 7pm Monday-Friday if you did not receive this call.    INSTRUCTIONS BEFORE YOUR SURGERY   When You  Arrive Arrive at [facility] Patient Access on 1st floor the day of your surgery.  Have your insurance card, photo ID,living will/advanced directive/POA (if applicable),  and any copayment (if needed)   Food   and   Drink NO solid food after midnight the night before surgery. You can drink clear liquids from midnight until ONE hour prior to your arrival at the hospital on the day of your surgery. Clear liquids include:  Water  Fruit juices without pulp  Carbonated beverages  Black coffee(no cream/milk)  Tea(no cream/milk)  Gatorade    No alcohol (beer, wine, liquor) or marijuana (smoking) 24 hours, edibles (3 days). Stop smoking cigarettes 14 days before surgery (helps w/healing and breathing).   Bathing Clothing  Jewelry  Valuables     When you shower the morning of surgery, please do not apply anything to your skin (lotions, powders, deodorant (if having breast surgery), or makeup, especially mascara). Remove fingernail polish except for clear.  Follow Chlorhexidine body wash instructions provided to you during PAT appointment. The night before and morning of surgery.  Do not shave or trim anywhere 24 hours before surgery  Wear your hair loose or down; no pony-tails, buns, or metal hair

## 2024-06-05 NOTE — PROGRESS NOTES
reconstructions were generated. CT dose reduction was achieved through  use of a standardized protocol tailored for this examination and automatic  exposure control for dose modulation. Three-dimensional postprocessing was  performed.    IV CONTRAST: 100 mL of Isovue 370    ORAL CONTRAST: None.    FINDINGS:    THYROID: No nodule.  MEDIASTINUM: No mass or lymphadenopathy.  CHRISTI: No mass or lymphadenopathy.  THORACIC AORTA: No dissection or aneurysm. Aortic valve calcification is noted.  Multivessel coronary artery calcification is evident.  MAIN PULMONARY ARTERY: Normal in caliber. No central embolism.  TRACHEA/BRONCHI: Patent.  ESOPHAGUS: No wall thickening or dilatation.  HEART: Normal in size.  PLEURA: No effusion or pneumothorax.  LUNGS: No nodule, mass, or airspace disease.    ABDOMINAL AORTA: No aneurysm or dissection. The mesenteric arteries are patent.  Renal arteries are patent. Atherosclerotic vascular disease is noted in the  abdominal aorta and iliac vasculature.  LIVER: Small hepatic cyst. Small enhancing nodules right hepatic lobe may  represent flash hemangiomas. GALLBLADDER: Unremarkable.  SPLEEN: No mass.  PANCREAS: No mass or ductal dilatation.  ADRENALS: Unremarkable.  KIDNEYS: Multiple bilateral nonobstructing renal stones are noted, the largest  of which measures 7 mm in the left kidney.  STOMACH: Small hiatal hernia.  SMALL BOWEL: No dilatation or wall thickening.  COLON: No dilatation or wall thickening. Sigmoid diverticulosis.  APPENDIX: Not distended.    PERITONEUM: No ascites or pneumoperitoneum.  RETROPERITONEUM: No lymphadenopathy or hemorrhage.    REPRODUCTIVE ORGANS: The prostate is enlarged, measuring 5.1 cm.  URINARY BLADDER: Dependent bladder stone.  BONES: Degenerative changes are seen in the thoracic and lumbar spine and hip  joints bilaterally.  ADDITIONAL COMMENTS: N/A    Impression  Sigmoid diverticulosis. Prostate enlargement. Bilateral nonobstructing renal  stones. Dependent

## 2024-06-05 NOTE — CONSENT
Informed Consent for Blood Component Transfusion Note    I have discussed with the patient and wife the rationale for blood component transfusion; its benefits in treating or preventing fatigue, organ damage, or death; and its risk which includes mild transfusion reactions, rare risk of blood borne infection, or more serious but rare reactions. I have discussed the alternatives to transfusion, including the risk and consequences of not receiving transfusion. The patient and wife had an opportunity to ask questions and had agreed to proceed with transfusion of blood components.    Electronically signed by CARLOS Quezada NP on 6/5/24 at 2:16 PM EDT

## 2024-06-06 LAB
ABO + RH BLD: NORMAL
BLOOD GROUP ANTIBODIES SERPL: NORMAL
SPECIMEN EXP DATE BLD: NORMAL

## 2024-06-10 DIAGNOSIS — I35.0 NONRHEUMATIC AORTIC VALVE STENOSIS: Primary | ICD-10-CM

## 2024-06-11 ENCOUNTER — OFFICE VISIT (OUTPATIENT)
Age: 79
End: 2024-06-11
Payer: MEDICARE

## 2024-06-11 ENCOUNTER — PROCEDURE VISIT (OUTPATIENT)
Age: 79
End: 2024-06-11

## 2024-06-11 VITALS
HEIGHT: 71 IN | WEIGHT: 184 LBS | OXYGEN SATURATION: 98 % | RESPIRATION RATE: 18 BRPM | BODY MASS INDEX: 25.76 KG/M2 | SYSTOLIC BLOOD PRESSURE: 98 MMHG | HEART RATE: 72 BPM | DIASTOLIC BLOOD PRESSURE: 68 MMHG

## 2024-06-11 DIAGNOSIS — I25.5 ISCHEMIC CARDIOMYOPATHY: ICD-10-CM

## 2024-06-11 DIAGNOSIS — I49.3 PVC (PREMATURE VENTRICULAR CONTRACTION): ICD-10-CM

## 2024-06-11 DIAGNOSIS — Z95.1 HISTORY OF CORONARY ARTERY BYPASS GRAFT: ICD-10-CM

## 2024-06-11 DIAGNOSIS — Z95.810 BIVENTRICULAR ICD (IMPLANTABLE CARDIOVERTER-DEFIBRILLATOR) IN PLACE: Primary | ICD-10-CM

## 2024-06-11 DIAGNOSIS — I50.22 CHRONIC SYSTOLIC CHF (CONGESTIVE HEART FAILURE), NYHA CLASS 3 (HCC): ICD-10-CM

## 2024-06-11 DIAGNOSIS — Z95.810 PRESENCE OF BIVENTRICULAR AUTOMATIC CARDIOVERTER/DEFIBRILLATOR (AICD): Primary | ICD-10-CM

## 2024-06-11 DIAGNOSIS — I35.0 NONRHEUMATIC AORTIC VALVE STENOSIS: ICD-10-CM

## 2024-06-11 PROCEDURE — 1036F TOBACCO NON-USER: CPT | Performed by: NURSE PRACTITIONER

## 2024-06-11 PROCEDURE — 1123F ACP DISCUSS/DSCN MKR DOCD: CPT | Performed by: NURSE PRACTITIONER

## 2024-06-11 PROCEDURE — 99214 OFFICE O/P EST MOD 30 MIN: CPT | Performed by: NURSE PRACTITIONER

## 2024-06-11 PROCEDURE — G8419 CALC BMI OUT NRM PARAM NOF/U: HCPCS | Performed by: NURSE PRACTITIONER

## 2024-06-11 PROCEDURE — G8427 DOCREV CUR MEDS BY ELIG CLIN: HCPCS | Performed by: NURSE PRACTITIONER

## 2024-06-11 ASSESSMENT — PATIENT HEALTH QUESTIONNAIRE - PHQ9
SUM OF ALL RESPONSES TO PHQ QUESTIONS 1-9: 0
1. LITTLE INTEREST OR PLEASURE IN DOING THINGS: NOT AT ALL
SUM OF ALL RESPONSES TO PHQ QUESTIONS 1-9: 0
SUM OF ALL RESPONSES TO PHQ9 QUESTIONS 1 & 2: 0
2. FEELING DOWN, DEPRESSED OR HOPELESS: NOT AT ALL
SUM OF ALL RESPONSES TO PHQ QUESTIONS 1-9: 0
SUM OF ALL RESPONSES TO PHQ QUESTIONS 1-9: 0

## 2024-06-12 ENCOUNTER — TELEPHONE (OUTPATIENT)
Age: 79
End: 2024-06-12

## 2024-06-12 ENCOUNTER — ANESTHESIA EVENT (OUTPATIENT)
Facility: HOSPITAL | Age: 79
End: 2024-06-12
Payer: MEDICARE

## 2024-06-12 NOTE — TELEPHONE ENCOUNTER
Cardiac Surgery Care Coordinator-  Called Aakash Faust to review plan of care and day of surgery expectations. Spoke to his wife, Encouraged them to verbalize and offered emotional support.  They are without questions or concerns at this time.

## 2024-06-13 ENCOUNTER — HOSPITAL ENCOUNTER (INPATIENT)
Facility: HOSPITAL | Age: 79
LOS: 1 days | Discharge: HOME | DRG: 267 | End: 2024-06-14
Attending: THORACIC SURGERY (CARDIOTHORACIC VASCULAR SURGERY) | Admitting: THORACIC SURGERY (CARDIOTHORACIC VASCULAR SURGERY)
Payer: MEDICARE

## 2024-06-13 ENCOUNTER — ANESTHESIA (OUTPATIENT)
Facility: HOSPITAL | Age: 79
End: 2024-06-13
Payer: MEDICARE

## 2024-06-13 ENCOUNTER — APPOINTMENT (OUTPATIENT)
Facility: HOSPITAL | Age: 79
DRG: 267 | End: 2024-06-13
Attending: THORACIC SURGERY (CARDIOTHORACIC VASCULAR SURGERY)
Payer: MEDICARE

## 2024-06-13 ENCOUNTER — APPOINTMENT (OUTPATIENT)
Facility: HOSPITAL | Age: 79
DRG: 267 | End: 2024-06-13
Attending: INTERNAL MEDICINE
Payer: MEDICARE

## 2024-06-13 DIAGNOSIS — I35.0 NON-RHEUMATIC AORTIC STENOSIS: Primary | ICD-10-CM

## 2024-06-13 DIAGNOSIS — I35.0 AORTIC STENOSIS: ICD-10-CM

## 2024-06-13 LAB
ABO + RH BLD: NORMAL
ALBUMIN SERPL-MCNC: 3.1 G/DL (ref 3.5–5)
ALBUMIN/GLOB SERPL: 1.1 (ref 1.1–2.2)
ALP SERPL-CCNC: 100 U/L (ref 45–117)
ALT SERPL-CCNC: 13 U/L (ref 12–78)
ANION GAP BLD CALC-SCNC: 10 (ref 10–20)
ANION GAP BLD CALC-SCNC: 13 (ref 10–20)
ANION GAP BLD CALC-SCNC: 9 (ref 10–20)
ANION GAP SERPL CALC-SCNC: 5 MMOL/L (ref 5–15)
APTT PPP: 27.2 SEC (ref 22.1–31)
AST SERPL-CCNC: 11 U/L (ref 15–37)
BASE DEFICIT BLD-SCNC: 0.7 MMOL/L
BASE DEFICIT BLD-SCNC: 1.8 MMOL/L
BASE DEFICIT BLD-SCNC: 3.8 MMOL/L
BILIRUB SERPL-MCNC: 0.6 MG/DL (ref 0.2–1)
BLD PROD TYP BPU: NORMAL
BLOOD BANK DISPENSE STATUS: NORMAL
BLOOD GROUP ANTIBODIES SERPL: NORMAL
BPU ID: NORMAL
BUN SERPL-MCNC: 20 MG/DL (ref 6–20)
BUN/CREAT SERPL: 17 (ref 12–20)
CA-I BLD-MCNC: 1.16 MMOL/L (ref 1.12–1.32)
CA-I BLD-MCNC: 1.21 MMOL/L (ref 1.12–1.32)
CA-I BLD-MCNC: 1.21 MMOL/L (ref 1.12–1.32)
CALCIUM SERPL-MCNC: 8.1 MG/DL (ref 8.5–10.1)
CHLORIDE BLD-SCNC: 106 MMOL/L (ref 100–108)
CHLORIDE BLD-SCNC: 107 MMOL/L (ref 100–108)
CHLORIDE BLD-SCNC: 107 MMOL/L (ref 100–108)
CHLORIDE SERPL-SCNC: 108 MMOL/L (ref 97–108)
CO2 BLD-SCNC: 22 MMOL/L (ref 19–24)
CO2 BLD-SCNC: 25 MMOL/L (ref 19–24)
CO2 BLD-SCNC: 26 MMOL/L (ref 19–24)
CO2 SERPL-SCNC: 25 MMOL/L (ref 21–32)
CREAT SERPL-MCNC: 1.2 MG/DL (ref 0.7–1.3)
CREAT UR-MCNC: 1 MG/DL (ref 0.6–1.3)
CREAT UR-MCNC: 1.1 MG/DL (ref 0.6–1.3)
CREAT UR-MCNC: 1.2 MG/DL (ref 0.6–1.3)
CROSSMATCH RESULT: NORMAL
EKG ATRIAL RATE: 60 BPM
EKG DIAGNOSIS: NORMAL
EKG P-R INTERVAL: 166 MS
EKG Q-T INTERVAL: 508 MS
EKG QRS DURATION: 174 MS
EKG QTC CALCULATION (BAZETT): 508 MS
EKG R AXIS: 248 DEGREES
EKG T AXIS: 40 DEGREES
EKG VENTRICULAR RATE: 60 BPM
ERYTHROCYTE [DISTWIDTH] IN BLOOD BY AUTOMATED COUNT: 13.1 % (ref 11.5–14.5)
GLOBULIN SER CALC-MCNC: 2.8 G/DL (ref 2–4)
GLUCOSE BLD STRIP.AUTO-MCNC: 103 MG/DL (ref 74–99)
GLUCOSE BLD STRIP.AUTO-MCNC: 108 MG/DL (ref 74–99)
GLUCOSE BLD STRIP.AUTO-MCNC: 97 MG/DL (ref 74–99)
GLUCOSE SERPL-MCNC: 113 MG/DL (ref 65–100)
HCO3 BLDA-SCNC: 22 MMOL/L
HCO3 BLDA-SCNC: 25 MMOL/L
HCO3 BLDA-SCNC: 25 MMOL/L
HCT VFR BLD AUTO: 34.6 % (ref 36.6–50.3)
HGB BLD-MCNC: 12 G/DL (ref 12.1–17)
INR PPP: 1.1 (ref 0.9–1.1)
LACTATE BLD-SCNC: 0.66 MMOL/L (ref 0.4–2)
LACTATE BLD-SCNC: 0.92 MMOL/L (ref 0.4–2)
LACTATE BLD-SCNC: 1.45 MMOL/L (ref 0.4–2)
MAGNESIUM SERPL-MCNC: 2.2 MG/DL (ref 1.6–2.4)
MCH RBC QN AUTO: 30.3 PG (ref 26–34)
MCHC RBC AUTO-ENTMCNC: 34.7 G/DL (ref 30–36.5)
MCV RBC AUTO: 87.4 FL (ref 80–99)
NRBC # BLD: 0 K/UL (ref 0–0.01)
NRBC BLD-RTO: 0 PER 100 WBC
NT PRO BNP: 1679 PG/ML
PCO2 BLD: 41.6 MMHG (ref 35–45)
PCO2 BLD: 44.1 MMHG (ref 35–45)
PCO2 BLD: 52.7 MMHG (ref 35–45)
PH BLD: 7.29 (ref 7.35–7.45)
PH BLD: 7.33 (ref 7.35–7.45)
PH BLD: 7.36 (ref 7.35–7.45)
PLATELET # BLD AUTO: 93 K/UL (ref 150–400)
PMV BLD AUTO: 10.2 FL (ref 8.9–12.9)
PO2 BLD: 101 MMHG (ref 80–100)
PO2 BLD: 131 MMHG (ref 80–100)
PO2 BLD: 169 MMHG (ref 80–100)
POTASSIUM BLD-SCNC: 3.9 MMOL/L (ref 3.5–5.5)
POTASSIUM BLD-SCNC: 4.3 MMOL/L (ref 3.5–5.5)
POTASSIUM BLD-SCNC: 4.3 MMOL/L (ref 3.5–5.5)
POTASSIUM SERPL-SCNC: 3.9 MMOL/L (ref 3.5–5.1)
PROT SERPL-MCNC: 5.9 G/DL (ref 6.4–8.2)
PROTHROMBIN TIME: 11.9 SEC (ref 9–11.1)
RBC # BLD AUTO: 3.96 M/UL (ref 4.1–5.7)
SAO2 % BLD: 97 %
SAO2 % BLD: 99 %
SAO2 % BLD: 99 %
SODIUM BLD-SCNC: 141 MMOL/L (ref 136–145)
SODIUM BLD-SCNC: 142 MMOL/L (ref 136–145)
SODIUM BLD-SCNC: 142 MMOL/L (ref 136–145)
SODIUM SERPL-SCNC: 138 MMOL/L (ref 136–145)
SPECIMEN EXP DATE BLD: NORMAL
SPECIMEN SITE: ABNORMAL
THERAPEUTIC RANGE: NORMAL SECS (ref 58–77)
UNIT DIVISION: 0
WBC # BLD AUTO: 4.7 K/UL (ref 4.1–11.1)

## 2024-06-13 PROCEDURE — 83735 ASSAY OF MAGNESIUM: CPT

## 2024-06-13 PROCEDURE — 02RF38N REPLACEMENT OF AORTIC VALVE WITH ZOOPLASTIC TISSUE, USING RAPID DEPLOYMENT TECHNIQUE, PERCUTANEOUS APPROACH: ICD-10-PCS | Performed by: THORACIC SURGERY (CARDIOTHORACIC VASCULAR SURGERY)

## 2024-06-13 PROCEDURE — S2900 ROBOTIC SURGICAL SYSTEM: HCPCS | Performed by: THORACIC SURGERY (CARDIOTHORACIC VASCULAR SURGERY)

## 2024-06-13 PROCEDURE — 2580000003 HC RX 258: Performed by: NURSE PRACTITIONER

## 2024-06-13 PROCEDURE — 5A1223Z PERFORMANCE OF CARDIAC PACING, CONTINUOUS: ICD-10-PCS | Performed by: THORACIC SURGERY (CARDIOTHORACIC VASCULAR SURGERY)

## 2024-06-13 PROCEDURE — 85018 HEMOGLOBIN: CPT

## 2024-06-13 PROCEDURE — 2580000003 HC RX 258

## 2024-06-13 PROCEDURE — 93308 TTE F-UP OR LMTD: CPT

## 2024-06-13 PROCEDURE — 80053 COMPREHEN METABOLIC PANEL: CPT

## 2024-06-13 PROCEDURE — C1713 ANCHOR/SCREW BN/BN,TIS/BN: HCPCS | Performed by: THORACIC SURGERY (CARDIOTHORACIC VASCULAR SURGERY)

## 2024-06-13 PROCEDURE — 3700000001 HC ADD 15 MINUTES (ANESTHESIA): Performed by: THORACIC SURGERY (CARDIOTHORACIC VASCULAR SURGERY)

## 2024-06-13 PROCEDURE — 6370000000 HC RX 637 (ALT 250 FOR IP): Performed by: NURSE PRACTITIONER

## 2024-06-13 PROCEDURE — 85730 THROMBOPLASTIN TIME PARTIAL: CPT

## 2024-06-13 PROCEDURE — 93010 ELECTROCARDIOGRAM REPORT: CPT | Performed by: INTERNAL MEDICINE

## 2024-06-13 PROCEDURE — 84132 ASSAY OF SERUM POTASSIUM: CPT

## 2024-06-13 PROCEDURE — C1889 IMPLANT/INSERT DEVICE, NOC: HCPCS | Performed by: THORACIC SURGERY (CARDIOTHORACIC VASCULAR SURGERY)

## 2024-06-13 PROCEDURE — 82803 BLOOD GASES ANY COMBINATION: CPT

## 2024-06-13 PROCEDURE — 85027 COMPLETE CBC AUTOMATED: CPT

## 2024-06-13 PROCEDURE — 2500000003 HC RX 250 WO HCPCS

## 2024-06-13 PROCEDURE — B3101ZZ FLUOROSCOPY OF THORACIC AORTA USING LOW OSMOLAR CONTRAST: ICD-10-PCS | Performed by: THORACIC SURGERY (CARDIOTHORACIC VASCULAR SURGERY)

## 2024-06-13 PROCEDURE — 6360000002 HC RX W HCPCS: Performed by: NURSE PRACTITIONER

## 2024-06-13 PROCEDURE — C1769 GUIDE WIRE: HCPCS | Performed by: THORACIC SURGERY (CARDIOTHORACIC VASCULAR SURGERY)

## 2024-06-13 PROCEDURE — 7100000001 HC PACU RECOVERY - ADDTL 15 MIN: Performed by: THORACIC SURGERY (CARDIOTHORACIC VASCULAR SURGERY)

## 2024-06-13 PROCEDURE — 71045 X-RAY EXAM CHEST 1 VIEW: CPT

## 2024-06-13 PROCEDURE — 33361 REPLACE AORTIC VALVE PERQ: CPT | Performed by: INTERNAL MEDICINE

## 2024-06-13 PROCEDURE — 2580000003 HC RX 258: Performed by: ANESTHESIOLOGY

## 2024-06-13 PROCEDURE — 2060000000 HC ICU INTERMEDIATE R&B

## 2024-06-13 PROCEDURE — 3600000019 HC SURGERY ROBOT ADDTL 15MIN: Performed by: THORACIC SURGERY (CARDIOTHORACIC VASCULAR SURGERY)

## 2024-06-13 PROCEDURE — 3700000000 HC ANESTHESIA ATTENDED CARE: Performed by: THORACIC SURGERY (CARDIOTHORACIC VASCULAR SURGERY)

## 2024-06-13 PROCEDURE — 2500000003 HC RX 250 WO HCPCS: Performed by: THORACIC SURGERY (CARDIOTHORACIC VASCULAR SURGERY)

## 2024-06-13 PROCEDURE — 82947 ASSAY GLUCOSE BLOOD QUANT: CPT

## 2024-06-13 PROCEDURE — C1894 INTRO/SHEATH, NON-LASER: HCPCS | Performed by: THORACIC SURGERY (CARDIOTHORACIC VASCULAR SURGERY)

## 2024-06-13 PROCEDURE — 93005 ELECTROCARDIOGRAM TRACING: CPT | Performed by: NURSE PRACTITIONER

## 2024-06-13 PROCEDURE — 99223 1ST HOSP IP/OBS HIGH 75: CPT | Performed by: INTERNAL MEDICINE

## 2024-06-13 PROCEDURE — 6360000004 HC RX CONTRAST MEDICATION: Performed by: THORACIC SURGERY (CARDIOTHORACIC VASCULAR SURGERY)

## 2024-06-13 PROCEDURE — 84295 ASSAY OF SERUM SODIUM: CPT

## 2024-06-13 PROCEDURE — 3600000009 HC SURGERY ROBOT BASE: Performed by: THORACIC SURGERY (CARDIOTHORACIC VASCULAR SURGERY)

## 2024-06-13 PROCEDURE — 4A023N7 MEASUREMENT OF CARDIAC SAMPLING AND PRESSURE, LEFT HEART, PERCUTANEOUS APPROACH: ICD-10-PCS | Performed by: THORACIC SURGERY (CARDIOTHORACIC VASCULAR SURGERY)

## 2024-06-13 PROCEDURE — 7100000000 HC PACU RECOVERY - FIRST 15 MIN: Performed by: THORACIC SURGERY (CARDIOTHORACIC VASCULAR SURGERY)

## 2024-06-13 PROCEDURE — 6370000000 HC RX 637 (ALT 250 FOR IP): Performed by: ANESTHESIOLOGY

## 2024-06-13 PROCEDURE — 6360000002 HC RX W HCPCS

## 2024-06-13 PROCEDURE — 2709999900 HC NON-CHARGEABLE SUPPLY: Performed by: THORACIC SURGERY (CARDIOTHORACIC VASCULAR SURGERY)

## 2024-06-13 PROCEDURE — 85610 PROTHROMBIN TIME: CPT

## 2024-06-13 PROCEDURE — 36415 COLL VENOUS BLD VENIPUNCTURE: CPT

## 2024-06-13 PROCEDURE — 82330 ASSAY OF CALCIUM: CPT

## 2024-06-13 PROCEDURE — C1760 CLOSURE DEV, VASC: HCPCS | Performed by: THORACIC SURGERY (CARDIOTHORACIC VASCULAR SURGERY)

## 2024-06-13 PROCEDURE — 83880 ASSAY OF NATRIURETIC PEPTIDE: CPT

## 2024-06-13 DEVICE — VALVE HRT TRANSCATH 26MM SAPIEN 3 ULTRA: Type: IMPLANTABLE DEVICE | Site: AORTIC VALVE | Status: FUNCTIONAL

## 2024-06-13 RX ORDER — SODIUM CHLORIDE 9 MG/ML
INJECTION, SOLUTION INTRAVENOUS PRN
Status: DISCONTINUED | OUTPATIENT
Start: 2024-06-13 | End: 2024-06-13 | Stop reason: HOSPADM

## 2024-06-13 RX ORDER — ASPIRIN 81 MG/1
81 TABLET ORAL
Status: DISCONTINUED | OUTPATIENT
Start: 2024-06-14 | End: 2024-06-14 | Stop reason: HOSPADM

## 2024-06-13 RX ORDER — MIDAZOLAM HYDROCHLORIDE 2 MG/2ML
2 INJECTION, SOLUTION INTRAMUSCULAR; INTRAVENOUS
Status: DISCONTINUED | OUTPATIENT
Start: 2024-06-13 | End: 2024-06-13 | Stop reason: HOSPADM

## 2024-06-13 RX ORDER — SODIUM CHLORIDE 0.9 % (FLUSH) 0.9 %
5-40 SYRINGE (ML) INJECTION PRN
Status: DISCONTINUED | OUTPATIENT
Start: 2024-06-13 | End: 2024-06-13 | Stop reason: HOSPADM

## 2024-06-13 RX ORDER — SODIUM CHLORIDE 9 MG/ML
INJECTION, SOLUTION INTRAVENOUS PRN
Status: DISCONTINUED | OUTPATIENT
Start: 2024-06-13 | End: 2024-06-14 | Stop reason: HOSPADM

## 2024-06-13 RX ORDER — SODIUM CHLORIDE 0.9 % (FLUSH) 0.9 %
5-40 SYRINGE (ML) INJECTION EVERY 12 HOURS SCHEDULED
Status: DISCONTINUED | OUTPATIENT
Start: 2024-06-13 | End: 2024-06-13 | Stop reason: HOSPADM

## 2024-06-13 RX ORDER — HYDRALAZINE HYDROCHLORIDE 20 MG/ML
10 INJECTION INTRAMUSCULAR; INTRAVENOUS
Status: DISCONTINUED | OUTPATIENT
Start: 2024-06-13 | End: 2024-06-13 | Stop reason: HOSPADM

## 2024-06-13 RX ORDER — DEXMEDETOMIDINE HYDROCHLORIDE 100 UG/ML
INJECTION, SOLUTION INTRAVENOUS PRN
Status: DISCONTINUED | OUTPATIENT
Start: 2024-06-13 | End: 2024-06-13 | Stop reason: SDUPTHER

## 2024-06-13 RX ORDER — SODIUM CHLORIDE, SODIUM LACTATE, POTASSIUM CHLORIDE, CALCIUM CHLORIDE 600; 310; 30; 20 MG/100ML; MG/100ML; MG/100ML; MG/100ML
INJECTION, SOLUTION INTRAVENOUS CONTINUOUS
Status: DISCONTINUED | OUTPATIENT
Start: 2024-06-13 | End: 2024-06-13 | Stop reason: HOSPADM

## 2024-06-13 RX ORDER — LOSARTAN POTASSIUM 25 MG/1
25 TABLET ORAL DAILY
Status: DISCONTINUED | OUTPATIENT
Start: 2024-06-13 | End: 2024-06-13

## 2024-06-13 RX ORDER — SODIUM CHLORIDE 0.9 % (FLUSH) 0.9 %
5-40 SYRINGE (ML) INJECTION EVERY 12 HOURS SCHEDULED
Status: DISCONTINUED | OUTPATIENT
Start: 2024-06-13 | End: 2024-06-14 | Stop reason: HOSPADM

## 2024-06-13 RX ORDER — SODIUM CHLORIDE, SODIUM LACTATE, POTASSIUM CHLORIDE, CALCIUM CHLORIDE 600; 310; 30; 20 MG/100ML; MG/100ML; MG/100ML; MG/100ML
INJECTION, SOLUTION INTRAVENOUS CONTINUOUS PRN
Status: DISCONTINUED | OUTPATIENT
Start: 2024-06-13 | End: 2024-06-13 | Stop reason: SDUPTHER

## 2024-06-13 RX ORDER — LIDOCAINE HYDROCHLORIDE 20 MG/ML
INJECTION, SOLUTION EPIDURAL; INFILTRATION; INTRACAUDAL; PERINEURAL PRN
Status: DISCONTINUED | OUTPATIENT
Start: 2024-06-13 | End: 2024-06-13 | Stop reason: SDUPTHER

## 2024-06-13 RX ORDER — DONEPEZIL HYDROCHLORIDE 5 MG/1
10 TABLET, FILM COATED ORAL NIGHTLY
Status: DISCONTINUED | OUTPATIENT
Start: 2024-06-13 | End: 2024-06-14 | Stop reason: HOSPADM

## 2024-06-13 RX ORDER — LIDOCAINE HYDROCHLORIDE 10 MG/ML
INJECTION, SOLUTION EPIDURAL; INFILTRATION; INTRACAUDAL; PERINEURAL PRN
Status: DISCONTINUED | OUTPATIENT
Start: 2024-06-13 | End: 2024-06-13 | Stop reason: ALTCHOICE

## 2024-06-13 RX ORDER — ACETAMINOPHEN 500 MG
1000 TABLET ORAL ONCE
Status: COMPLETED | OUTPATIENT
Start: 2024-06-13 | End: 2024-06-13

## 2024-06-13 RX ORDER — SODIUM CHLORIDE 0.9 % (FLUSH) 0.9 %
5-40 SYRINGE (ML) INJECTION PRN
Status: DISCONTINUED | OUTPATIENT
Start: 2024-06-13 | End: 2024-06-14 | Stop reason: HOSPADM

## 2024-06-13 RX ORDER — PHENYLEPHRINE HCL IN 0.9% NACL 0.4MG/10ML
SYRINGE (ML) INTRAVENOUS PRN
Status: DISCONTINUED | OUTPATIENT
Start: 2024-06-13 | End: 2024-06-13 | Stop reason: SDUPTHER

## 2024-06-13 RX ORDER — NALOXONE HYDROCHLORIDE 0.4 MG/ML
INJECTION, SOLUTION INTRAMUSCULAR; INTRAVENOUS; SUBCUTANEOUS PRN
Status: DISCONTINUED | OUTPATIENT
Start: 2024-06-13 | End: 2024-06-13 | Stop reason: HOSPADM

## 2024-06-13 RX ORDER — LIDOCAINE HYDROCHLORIDE 10 MG/ML
1 INJECTION, SOLUTION EPIDURAL; INFILTRATION; INTRACAUDAL; PERINEURAL
Status: DISCONTINUED | OUTPATIENT
Start: 2024-06-13 | End: 2024-06-13 | Stop reason: HOSPADM

## 2024-06-13 RX ORDER — ACETAMINOPHEN 325 MG/1
650 TABLET ORAL EVERY 4 HOURS PRN
Status: DISCONTINUED | OUTPATIENT
Start: 2024-06-13 | End: 2024-06-14 | Stop reason: HOSPADM

## 2024-06-13 RX ORDER — ASPIRIN 81 MG/1
81 TABLET ORAL DAILY
Status: DISCONTINUED | OUTPATIENT
Start: 2024-06-13 | End: 2024-06-13

## 2024-06-13 RX ORDER — FENTANYL CITRATE 50 UG/ML
100 INJECTION, SOLUTION INTRAMUSCULAR; INTRAVENOUS
Status: DISCONTINUED | OUTPATIENT
Start: 2024-06-13 | End: 2024-06-13 | Stop reason: HOSPADM

## 2024-06-13 RX ORDER — ONDANSETRON 2 MG/ML
4 INJECTION INTRAMUSCULAR; INTRAVENOUS EVERY 6 HOURS PRN
Status: DISCONTINUED | OUTPATIENT
Start: 2024-06-13 | End: 2024-06-14 | Stop reason: HOSPADM

## 2024-06-13 RX ORDER — ONDANSETRON 2 MG/ML
4 INJECTION INTRAMUSCULAR; INTRAVENOUS
Status: DISCONTINUED | OUTPATIENT
Start: 2024-06-13 | End: 2024-06-13 | Stop reason: HOSPADM

## 2024-06-13 RX ORDER — HYDROMORPHONE HYDROCHLORIDE 1 MG/ML
0.5 INJECTION, SOLUTION INTRAMUSCULAR; INTRAVENOUS; SUBCUTANEOUS EVERY 5 MIN PRN
Status: DISCONTINUED | OUTPATIENT
Start: 2024-06-13 | End: 2024-06-13 | Stop reason: HOSPADM

## 2024-06-13 RX ORDER — PROTAMINE SULFATE 10 MG/ML
INJECTION, SOLUTION INTRAVENOUS PRN
Status: DISCONTINUED | OUTPATIENT
Start: 2024-06-13 | End: 2024-06-13 | Stop reason: SDUPTHER

## 2024-06-13 RX ORDER — PROCHLORPERAZINE EDISYLATE 5 MG/ML
5 INJECTION INTRAMUSCULAR; INTRAVENOUS
Status: DISCONTINUED | OUTPATIENT
Start: 2024-06-13 | End: 2024-06-13 | Stop reason: HOSPADM

## 2024-06-13 RX ORDER — HYDRALAZINE HYDROCHLORIDE 20 MG/ML
10 INJECTION INTRAMUSCULAR; INTRAVENOUS EVERY 10 MIN PRN
Status: DISCONTINUED | OUTPATIENT
Start: 2024-06-13 | End: 2024-06-14 | Stop reason: HOSPADM

## 2024-06-13 RX ORDER — HEPARIN SODIUM 1000 [USP'U]/ML
INJECTION, SOLUTION INTRAVENOUS; SUBCUTANEOUS PRN
Status: DISCONTINUED | OUTPATIENT
Start: 2024-06-13 | End: 2024-06-13 | Stop reason: SDUPTHER

## 2024-06-13 RX ORDER — OXYCODONE HYDROCHLORIDE 5 MG/1
5 TABLET ORAL
Status: DISCONTINUED | OUTPATIENT
Start: 2024-06-13 | End: 2024-06-13 | Stop reason: HOSPADM

## 2024-06-13 RX ORDER — LOSARTAN POTASSIUM 25 MG/1
25 TABLET ORAL
Status: DISCONTINUED | OUTPATIENT
Start: 2024-06-14 | End: 2024-06-14 | Stop reason: HOSPADM

## 2024-06-13 RX ORDER — TAMSULOSIN HYDROCHLORIDE 0.4 MG/1
0.4 CAPSULE ORAL NIGHTLY
Status: DISCONTINUED | OUTPATIENT
Start: 2024-06-13 | End: 2024-06-14 | Stop reason: HOSPADM

## 2024-06-13 RX ORDER — POLYETHYLENE GLYCOL 3350 17 G/17G
17 POWDER, FOR SOLUTION ORAL DAILY PRN
Status: DISCONTINUED | OUTPATIENT
Start: 2024-06-13 | End: 2024-06-14 | Stop reason: HOSPADM

## 2024-06-13 RX ORDER — FENTANYL CITRATE 50 UG/ML
INJECTION, SOLUTION INTRAMUSCULAR; INTRAVENOUS PRN
Status: DISCONTINUED | OUTPATIENT
Start: 2024-06-13 | End: 2024-06-13 | Stop reason: SDUPTHER

## 2024-06-13 RX ORDER — FENTANYL CITRATE 50 UG/ML
25 INJECTION, SOLUTION INTRAMUSCULAR; INTRAVENOUS EVERY 5 MIN PRN
Status: DISCONTINUED | OUTPATIENT
Start: 2024-06-13 | End: 2024-06-13 | Stop reason: HOSPADM

## 2024-06-13 RX ORDER — LANOLIN ALCOHOL/MO/W.PET/CERES
3 CREAM (GRAM) TOPICAL NIGHTLY PRN
Status: DISCONTINUED | OUTPATIENT
Start: 2024-06-13 | End: 2024-06-14 | Stop reason: HOSPADM

## 2024-06-13 RX ADMIN — SODIUM CHLORIDE, POTASSIUM CHLORIDE, SODIUM LACTATE AND CALCIUM CHLORIDE: 600; 310; 30; 20 INJECTION, SOLUTION INTRAVENOUS at 09:03

## 2024-06-13 RX ADMIN — DEXMEDETOMIDINE HYDROCHLORIDE 8 MCG: 100 INJECTION, SOLUTION, CONCENTRATE INTRAVENOUS at 11:26

## 2024-06-13 RX ADMIN — FENTANYL CITRATE 25 MCG: 50 INJECTION, SOLUTION INTRAMUSCULAR; INTRAVENOUS at 10:02

## 2024-06-13 RX ADMIN — PROPOFOL 15 MG: 10 INJECTION, EMULSION INTRAVENOUS at 10:53

## 2024-06-13 RX ADMIN — EPINEPHRINE 1 MCG/MIN: 0.1 INJECTION, SOLUTION INTRAVENOUS at 10:46

## 2024-06-13 RX ADMIN — ACETAMINOPHEN 1000 MG: 500 TABLET ORAL at 09:10

## 2024-06-13 RX ADMIN — SODIUM CHLORIDE, SODIUM LACTATE, POTASSIUM CHLORIDE, AND CALCIUM CHLORIDE: 600; 310; 30; 20 INJECTION, SOLUTION INTRAVENOUS at 09:49

## 2024-06-13 RX ADMIN — Medication 3 MG: at 20:43

## 2024-06-13 RX ADMIN — DEXMEDETOMIDINE HYDROCHLORIDE 4 MCG: 100 INJECTION, SOLUTION, CONCENTRATE INTRAVENOUS at 10:07

## 2024-06-13 RX ADMIN — PROTAMINE SULFATE 100 MG: 10 INJECTION, SOLUTION INTRAVENOUS at 11:13

## 2024-06-13 RX ADMIN — DEXMEDETOMIDINE HYDROCHLORIDE 4 MCG: 100 INJECTION, SOLUTION, CONCENTRATE INTRAVENOUS at 10:12

## 2024-06-13 RX ADMIN — PROPOFOL 30 MCG/KG/MIN: 10 INJECTION, EMULSION INTRAVENOUS at 09:59

## 2024-06-13 RX ADMIN — Medication 80 MCG: at 11:01

## 2024-06-13 RX ADMIN — FENTANYL CITRATE 25 MCG: 50 INJECTION, SOLUTION INTRAMUSCULAR; INTRAVENOUS at 11:07

## 2024-06-13 RX ADMIN — SODIUM CHLORIDE, POTASSIUM CHLORIDE, SODIUM LACTATE AND CALCIUM CHLORIDE: 600; 310; 30; 20 INJECTION, SOLUTION INTRAVENOUS at 08:56

## 2024-06-13 RX ADMIN — Medication 80 MCG: at 10:49

## 2024-06-13 RX ADMIN — Medication 40 MCG: at 10:29

## 2024-06-13 RX ADMIN — PHENYLEPHRINE HYDROCHLORIDE 20 MCG/MIN: 10 INJECTION INTRAVENOUS at 09:59

## 2024-06-13 RX ADMIN — DEXMEDETOMIDINE HYDROCHLORIDE 4 MCG: 100 INJECTION, SOLUTION, CONCENTRATE INTRAVENOUS at 09:59

## 2024-06-13 RX ADMIN — SODIUM CHLORIDE, PRESERVATIVE FREE 10 ML: 5 INJECTION INTRAVENOUS at 20:40

## 2024-06-13 RX ADMIN — FENTANYL CITRATE 25 MCG: 50 INJECTION, SOLUTION INTRAMUSCULAR; INTRAVENOUS at 10:29

## 2024-06-13 RX ADMIN — DONEPEZIL HYDROCHLORIDE 10 MG: 5 TABLET, FILM COATED ORAL at 20:40

## 2024-06-13 RX ADMIN — Medication 80 MCG: at 11:06

## 2024-06-13 RX ADMIN — DEXMEDETOMIDINE HYDROCHLORIDE 4 MCG: 100 INJECTION, SOLUTION, CONCENTRATE INTRAVENOUS at 10:04

## 2024-06-13 RX ADMIN — Medication 80 MCG: at 10:45

## 2024-06-13 RX ADMIN — TAMSULOSIN HYDROCHLORIDE 0.4 MG: 0.4 CAPSULE ORAL at 20:40

## 2024-06-13 RX ADMIN — WATER 2000 MG: 1 INJECTION INTRAMUSCULAR; INTRAVENOUS; SUBCUTANEOUS at 10:30

## 2024-06-13 RX ADMIN — PROPOFOL 20 MG: 10 INJECTION, EMULSION INTRAVENOUS at 11:17

## 2024-06-13 RX ADMIN — DEXMEDETOMIDINE HYDROCHLORIDE 4 MCG: 100 INJECTION, SOLUTION, CONCENTRATE INTRAVENOUS at 10:09

## 2024-06-13 RX ADMIN — LIDOCAINE HYDROCHLORIDE 50 MG: 20 INJECTION, SOLUTION EPIDURAL; INFILTRATION; INTRACAUDAL; PERINEURAL at 09:59

## 2024-06-13 RX ADMIN — Medication 40 MCG: at 10:12

## 2024-06-13 RX ADMIN — Medication 80 MCG: at 11:20

## 2024-06-13 RX ADMIN — FENTANYL CITRATE 50 MCG: 50 INJECTION, SOLUTION INTRAMUSCULAR; INTRAVENOUS at 11:17

## 2024-06-13 RX ADMIN — Medication 40 MCG: at 10:35

## 2024-06-13 RX ADMIN — FENTANYL CITRATE 25 MCG: 50 INJECTION, SOLUTION INTRAMUSCULAR; INTRAVENOUS at 10:50

## 2024-06-13 RX ADMIN — DEXMEDETOMIDINE HYDROCHLORIDE 8 MCG: 100 INJECTION, SOLUTION, CONCENTRATE INTRAVENOUS at 11:25

## 2024-06-13 RX ADMIN — PROPOFOL 15 MG: 10 INJECTION, EMULSION INTRAVENOUS at 10:48

## 2024-06-13 RX ADMIN — HEPARIN SODIUM 11000 UNITS: 1000 INJECTION, SOLUTION INTRAVENOUS; SUBCUTANEOUS at 10:56

## 2024-06-13 RX ADMIN — FENTANYL CITRATE 25 MCG: 50 INJECTION, SOLUTION INTRAMUSCULAR; INTRAVENOUS at 10:55

## 2024-06-13 RX ADMIN — FENTANYL CITRATE 25 MCG: 50 INJECTION, SOLUTION INTRAMUSCULAR; INTRAVENOUS at 10:17

## 2024-06-13 ASSESSMENT — PAIN - FUNCTIONAL ASSESSMENT
PAIN_FUNCTIONAL_ASSESSMENT: NONE - DENIES PAIN
PAIN_FUNCTIONAL_ASSESSMENT: 0-10
PAIN_FUNCTIONAL_ASSESSMENT: NONE - DENIES PAIN

## 2024-06-13 ASSESSMENT — PAIN SCALES - GENERAL: PAINLEVEL_OUTOF10: 0

## 2024-06-13 NOTE — CONSULTS
Dr. Justus Samaniego [facility].  [phone]            Cardiology structural heart disease consult/Progress Note      Requesting/referring provider: [facility]    Reason for Consult: Aortic valve disease    Assessment/Plan:  1.  Moderate to severe possibly aortic stenosis with recent invasive assessment suggesting classic low-flow low gradient severe aortic stenosis  2.  Coronary disease status post prior coronary bypass grafting  3.  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  Frequent PVCs  5.  Forgetfulness/ memory loss/gait disturbances, neurology concerned about her degenerative condition  6.  History of hyperlipidemia, statin discontinued because of forgetfulness  7.  Frequent PVCs on clinical examination        Aakash Faust is seen at the request of Dr. Murcia for evaluating his valvular heart disease.  Clinically he is aortic stenosis appears to be borderline severe clinically.  On dobutamine challenge, his mean resting gradient increased from 23 mmHg to 43 mmHg across his aortic valve.  Hence he was felt to be a suitable candidate who may benefit potentially from TAVR for suspected low-flow low gradient severe aortic stenosis.  His case was discussed in multidisciplinary meeting.  His CT is was personally reviewed and demonstrates suitability for transfemoral TAVR.I informed the patient that in the absence of any definitive therapy severe symptomatic aortic stenosis confers a 50% 2 to 5-year mortality.  I specifically discussed TAVR related procedural risks such as vascular complication, risk of stroke, risk of pacemaker need post TAVR, risk of conversion to open surgery, death from TAVR procedure.  I also discussed the expected benefits include improvement in functional status, reduction in angina, improvement in exertional tolerance from the procedure.  Finally I reviewed expected

## 2024-06-13 NOTE — PERIOP NOTE
Preoperative assessment completed by TREMAYNE Gonzalez. Patient verbalized understanding of surgical plan and consent reviewed. Allergies verified. NPO status confirmed. Intraoperative education given by RN. Opportunity given to ask questions.

## 2024-06-13 NOTE — INTERVAL H&P NOTE
Update History & Physical    The patient's History and Physical of June 5, 2024 was reviewed with the patient and I examined the patient. There was no change. The surgical site was confirmed by the patient and me.     Plan: The risks, benefits, expected outcome, and alternative to the recommended procedure have been discussed with the patient. Patient understands and wants to proceed with the procedure.     Electronically signed by CARLOS Quezada NP on 6/13/2024 at 9:35 AM

## 2024-06-13 NOTE — PROGRESS NOTES
Cardiac Surgery Coordinator- Met with the family of Aakash Faust, introduced role of the Cardiac Surgery Care Coordinator.  Reviewed plan of care and day of surgery expectations. Provided family with an update from OR.  Encouraged family to verbalize and emotional support given.  Will continue to update throughout the day.    1100- Unable to locate the family of Aakash Faust     1145- Placed call to Mrs Faust she will waiting in the surgical waiting area.     1225- Met briefly with Mrs Faust, provided update from the OR. Will continue to follow.     1515- Transcatheter educational folder to the bedside, reviewed post procedure plan of care and encouraged them to verbalize.    Mr. Badillo is a 55 y/o man with who presented with an Anterior STEMI s/p multiple PCI. His hospital course has been complicated by Cardiogenic Shock which initially required Impella CP support, and Acute Hypoxic Respiratory Failure due to resulting pulmonary edema. He was upgraded to Impella 5.0 on 6/17 with improvement of his hemodynamics.  However he continues to have evidence of an inflammatory state without clear evidence of bacterial infection, apart from Stenotrophomonas in his sputum (6/21). He has evidence of ARDS (b/l pulmonary infiltrates with normal PCWP) and has been unable to be extubated. In addition, he has a history of ulcerative colitis and current large bowel colitis. He was found to have upper GI bleed due to stomach ulcers s/p cauterization 6/20.  He is currently euvolemic, but remains on Impella support, which was exchanged on 6/24. 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 He had been on CVVH due to worsening renal failure but it was discontinued on 7/6. He remained on bumex gtt until 7/7 but has had increased UOP without diuretics since. Of note, he developed diarrhea believed to be 2/2 UC and was started on systemic steroids 7/9 however that has improved with solumedrol and mesalamine.  Trach placed on 7/9. Family meeting was held  7/24. The family wants to wait one more week for his recovery. He remains full code.        Impella P5 flow 3.2L    Overall, he remains critically ill.  Maintain comfort and continue to have GOC discussion with the family.      Plan discussed with CTU and CT surgical team.

## 2024-06-13 NOTE — OP NOTE
Operative Note      Patient: Aakash Faust  YOB: 1945  MRN: [id number]    Date of Procedure: 6/13/2024    Pre-Op Diagnosis Codes:     * Aortic stenosis [I35.0]         Procedure(s):  TRANSCATHETER AORTIC VALVE REPLACEMENT RIGHT TRANSFEMORAL APPROACH  26 S 3  .    Surgeon(s):  Raghav Dukes MD Kaushik, Manu, MD    Assistant:   * No surgical staff found *    Anesthesia: Monitor Anesthesia Care    Estimated Blood Loss (mL): less than 50     PROCEDURALISTS:?   Surgeon 1. Neymar Jerome MD?  CoSurgeon 1.   2.     PROCEDURES:  1. Angiography of the ascending aorta and aortoiliac bifurcation  2. Temporary transvenous pacemaker insertion  3. Left heart catheterization  4. Implantation of a 26 mm Russell S3 right transfemoral approach  5. 16F right?femoral artery access with Perclose pre-closure / 6F left femoral vein access with manual compression / 6F left femoral artery access with Perclose closure    INDICATIONS:?Aakash Faust is a 78 y.o. ?old male  with severe symptomatic aortic stenosis, NYHA Class III symptom status.? He referred for transfemoral TAVR procedure. He was deemed as high risk for SAVR.    PRE-OP DIAGNOSIS:  1. Severe, symptomatic aortic stenosis (NYHA, Class III)  2. No Coronary artery disease  3. Severe Cardiomyopathy    POST-OP DIAGNOSIS:  1. Status post successful implantation of a 26 mm Russell S3 aortic valve via the right transfemoral approach  2. No significant post operative conduction block    PROCEDURAL DETAILS: The risks (death, myocardial infarction, stroke, bleeding, vascular complications, renal dysfunction, allergic reactions, and other), benefits, and alternatives were explained and discussed. Signed, informed consent was obtained. The patient was placed on the table and prepped and draped in the usual fashion.    ARTERIAL ACCESS:?   - Right: Right femoral artery access was obtained using ultrasound and a micropuncture needle and sheath system.? Angiography  confirmed adequate position within the mid right CFA without significant angiographic stenosis or irregularity. Pre-close technique was performed using two orthogonally positioned Perclose closure devices.? A 6F sheath was placed before it was later up-sized to the large bore TAVR sheath.? Post-procedure, the sheath was removed and the arteriotomy was closed using the pre-close technique. Final hemostasis was excellent.  - Left: A 6 [place] sheath was placed in the left common femoral artery without difficulty.? Post-procedure, the sheath was removed and hemostasis was achieved with a Perclose closure device.  - A 6F sheath was placed in the left common femoral vein without difficulty. Post-procedure the sheath was removed and manual compression was applied to achieve hemostasis.    PROCEDURAL MEDICATIONS:?   Monitored anaesthesia care (MAC) with conscious sedation. Please see Anesthesia record for full details.Local anesthesia - 1% lidocaine was administered to the bilateral groins.??     FINDINGS:  ANGIOGRAPHY OF THE ASCENDING AORTA:? There is significant calcification of the aortic valve and annulus.? Appropriate co-planar views were identified for valve deployment. Post-deployment, the transcatheter valve appeared well positioned with appropriate function and only trace to mild para-valvular aortic regurgitation.? Coronary arteries remained patent without obstruction.?   AORTOILIAC ANGIOGRAPHY:?The abdominal aorta and bilateral iliac arteries are widely patent without evidence of aneurysm or stenosis.? There is no evidence of dissection, perforation or other complications.?     INTERVENTION:  - A temporary transvenous pacemaker wire was inserted via the left common femoral vein access and positioned in the RV apical position.? Appropriate position and function were confirmed. Rapid ventricular pacing (200BPM) was performed in concert with the valve implantations procedures.  - Left heart catheterization: aortic  valve crossed with a 0.035 inch straight wire and this wire was exchanged out for an Confida wire. Systemic heparin was administered to maintain an ACT between 250-300 seconds.  - Transaortic valve implantation: Rapid pacing performed in coordination with deployment of a transcatheter valve.? Post-deployment angiography and transthoracic echocardiography confirmed appropriate position and function, there was trace-to-mild para-valvular regurgitation. LV function was preserved.? There was no pericardial effusion.?   - The temporary pacemaker was removed.    CONTRAST VOLUME:?75 mL Omnipaque  BLOOD LOSS:Minimal  COMPLICATIONS:?None  SPECIMENS:?None    RECOMMENDATIONS  - Admit to [facility]  - Aspirin  - Echocardiogram, labs and ECG in AM  - Early ambulation   Electronically signed by Neymar Jerome MD on 6/13/2024 at 5:27 PM

## 2024-06-13 NOTE — PERIOP NOTE
TRANSFER - OUT REPORT:    Verbal report given to TREMAYNE Alvarado on Aakash Faust  being transferred to [facility] for routine post-op       Report consisted of patient’s Situation, Background, Assessment and   Recommendations(SBAR).     Time Pre op antibiotic given:1030  Anesthesia Stop time: 1152    Information from the following report(s) SBAR, OR Summary, Intake/Output, MAR, and Recent Results was reviewed with the receiving nurse.    Opportunity for questions and clarification was provided.     Is the patient on 02? No       L/Min -       Other -    Is the patient on a monitor? Yes    Is the nurse transporting with the patient? Yes    Surgical Waiting Area notified of patient's transfer from PACU? Yes

## 2024-06-13 NOTE — ANESTHESIA PRE PROCEDURE
Department of Anesthesiology  Preprocedure Note       Name:  Aakash Faust   Age:  78 y.o.  :  1945                                          MRN:  [id number]         Date:  2024      Surgeon: Surgeon(s):  Raghav Dukes MD Kaushik, Manu, MD    Procedure: Procedure(s):  TRANSCATHETER AORTIC VALVE REPLACEMENT RIGHT TRANSFEMORAL APPROACH  26 S 3  .    Medications prior to admission:   Prior to Admission medications    Medication Sig Start Date End Date Taking? Authorizing Provider   melatonin 3 MG TABS tablet Take 1 tablet by mouth nightly as needed    Violetta Lim MD   magnesium gluconate (MAGONATE) 500 MG tablet Take 400 mg by mouth as needed (LEG CRAMPS AT NIGHT)    Violetta Lim MD   DICLOFENAC PO Take 75 mg by mouth as needed (PAIN)    Violetta Lim MD   acetaminophen (TYLENOL) 325 MG tablet Take 2 tablets by mouth every 6 hours as needed for Pain  Patient not taking: Reported on 2024    Violetta Lim MD   MAGNESIUM GLUCONATE PO Take 240 mg by mouth as needed (FOR LEG CRAMPS AT NIGHT)    Violetta Lim MD   donepezil (ARICEPT) 10 MG tablet Take 1 tablet by mouth nightly    Violetta Lim MD   tamsulosin (FLOMAX) 0.4 MG capsule Take 1 capsule by mouth nightly    Violetta Lim MD   losartan (COZAAR) 25 MG tablet Take 1 tablet by mouth daily  Patient not taking: Reported on 2024   Aakash Murcia III, MD   aspirin 81 MG EC tablet Take 1 tablet by mouth daily 3 x a week    Violetta Lim MD       Current medications:    Current Facility-Administered Medications   Medication Dose Route Frequency Provider Last Rate Last Admin    lidocaine PF 1 % injection 1 mL  1 mL IntraDERmal Once PRN Jeovanny oPp MD        acetaminophen (TYLENOL) tablet 1,000 mg  1,000 mg Oral Once Jeovanny Pop MD        fentaNYL (SUBLIMAZE) injection 100 mcg  100 mcg IntraVENous Once PRN Jeovanny Pop MD

## 2024-06-13 NOTE — ANESTHESIA PROCEDURE NOTES
Arterial Line:    An arterial line was placed using surface landmarks, in the pre-op for the following indication(s): continuous blood pressure monitoring and blood sampling needed.    A 20 gauge (size) (length), Arrow (type) catheter was placed, Seldinger technique used, into the right radial artery, secured by Tegaderm.  Anesthesia type: Local  Local infiltration: Injection    Events:  patient tolerated procedure well with no complications.  Anesthesiologist: Jeovanny Pop MD  Performed: Anesthesiologist   Preanesthetic Checklist  Completed: patient identified, IV checked, site marked, risks and benefits discussed, surgical/procedural consents, equipment checked, pre-op evaluation, timeout performed, anesthesia consent given, oxygen available, monitors applied/VS acknowledged and fire risk safety assessment completed and verbalized

## 2024-06-14 ENCOUNTER — APPOINTMENT (OUTPATIENT)
Facility: HOSPITAL | Age: 79
DRG: 267 | End: 2024-06-14
Attending: THORACIC SURGERY (CARDIOTHORACIC VASCULAR SURGERY)
Payer: MEDICARE

## 2024-06-14 VITALS
OXYGEN SATURATION: 93 % | WEIGHT: 178.79 LBS | BODY MASS INDEX: 25.03 KG/M2 | TEMPERATURE: 98 F | HEART RATE: 79 BPM | DIASTOLIC BLOOD PRESSURE: 66 MMHG | RESPIRATION RATE: 17 BRPM | SYSTOLIC BLOOD PRESSURE: 124 MMHG | HEIGHT: 71 IN

## 2024-06-14 DIAGNOSIS — Z95.4 S/P AORTIC VALVE ALLOGRAFT: Primary | ICD-10-CM

## 2024-06-14 PROBLEM — Z95.2 S/P TAVR (TRANSCATHETER AORTIC VALVE REPLACEMENT): Status: ACTIVE | Noted: 2024-06-14

## 2024-06-14 LAB
ANION GAP SERPL CALC-SCNC: 5 MMOL/L (ref 5–15)
BUN SERPL-MCNC: 17 MG/DL (ref 6–20)
BUN/CREAT SERPL: 16 (ref 12–20)
CALCIUM SERPL-MCNC: 8.5 MG/DL (ref 8.5–10.1)
CHLORIDE SERPL-SCNC: 108 MMOL/L (ref 97–108)
CO2 SERPL-SCNC: 24 MMOL/L (ref 21–32)
CREAT SERPL-MCNC: 1.09 MG/DL (ref 0.7–1.3)
ECHO AV AREA PEAK VELOCITY: 1.9 CM2
ECHO AV AREA VTI: 1.8 CM2
ECHO AV AREA/BSA PEAK VELOCITY: 0.9 CM2/M2
ECHO AV AREA/BSA VTI: 0.9 CM2/M2
ECHO AV MEAN GRADIENT: 15 MMHG
ECHO AV MEAN VELOCITY: 1.9 M/S
ECHO AV PEAK GRADIENT: 23 MMHG
ECHO AV PEAK VELOCITY: 2.4 M/S
ECHO AV VELOCITY RATIO: 0.46
ECHO AV VTI: 50.3 CM
ECHO BSA: 2.04 M2
ECHO BSA: 2.04 M2
ECHO EST RA PRESSURE: 3 MMHG
ECHO LA DIAMETER INDEX: 2.49 CM/M2
ECHO LA DIAMETER: 5 CM
ECHO LA VOL A-L A4C: 105 ML (ref 18–58)
ECHO LA VOL MOD A4C: 99 ML (ref 18–58)
ECHO LA VOLUME INDEX A-L A4C: 52 ML/M2 (ref 16–34)
ECHO LA VOLUME INDEX MOD A4C: 49 ML/M2 (ref 16–34)
ECHO LV E' LATERAL VELOCITY: 9 CM/S
ECHO LV FRACTIONAL SHORTENING: 22 % (ref 28–44)
ECHO LV INTERNAL DIMENSION DIASTOLE INDEX: 2.89 CM/M2
ECHO LV INTERNAL DIMENSION DIASTOLIC: 5.8 CM (ref 4.2–5.9)
ECHO LV INTERNAL DIMENSION SYSTOLIC INDEX: 2.24 CM/M2
ECHO LV INTERNAL DIMENSION SYSTOLIC: 4.5 CM
ECHO LV IVSD: 1.1 CM (ref 0.6–1)
ECHO LV MASS 2D: 248.5 G (ref 88–224)
ECHO LV MASS INDEX 2D: 123.6 G/M2 (ref 49–115)
ECHO LV POSTERIOR WALL DIASTOLIC: 1 CM (ref 0.6–1)
ECHO LV RELATIVE WALL THICKNESS RATIO: 0.34
ECHO LVOT AREA: 4.2 CM2
ECHO LVOT AV VTI INDEX: 0.45
ECHO LVOT DIAM: 2.3 CM
ECHO LVOT MEAN GRADIENT: 3 MMHG
ECHO LVOT PEAK GRADIENT: 5 MMHG
ECHO LVOT PEAK VELOCITY: 1.1 M/S
ECHO LVOT STROKE VOLUME INDEX: 46.3 ML/M2
ECHO LVOT SV: 93 ML
ECHO LVOT VTI: 22.4 CM
ECHO MV E VELOCITY: 0.29 M/S
ECHO MV E/E' LATERAL: 3.22
ECHO RV INTERNAL DIMENSION: 4.4 CM
ECHO RV TAPSE: 1.9 CM (ref 1.7–?)
EKG ATRIAL RATE: 75 BPM
EKG DIAGNOSIS: NORMAL
EKG P AXIS: 64 DEGREES
EKG P-R INTERVAL: 138 MS
EKG Q-T INTERVAL: 444 MS
EKG QRS DURATION: 160 MS
EKG QTC CALCULATION (BAZETT): 495 MS
EKG R AXIS: 211 DEGREES
EKG T AXIS: 14 DEGREES
EKG VENTRICULAR RATE: 75 BPM
ERYTHROCYTE [DISTWIDTH] IN BLOOD BY AUTOMATED COUNT: 13.1 % (ref 11.5–14.5)
GLUCOSE SERPL-MCNC: 102 MG/DL (ref 65–100)
HCT VFR BLD AUTO: 35.6 % (ref 36.6–50.3)
HGB BLD-MCNC: 11.4 G/DL (ref 12.1–17)
HGB BLD-MCNC: 12.2 G/DL (ref 12.1–17)
HGB BLD-MCNC: 12.3 G/DL (ref 12.1–17)
HGB BLD-MCNC: 12.4 G/DL (ref 12.1–17)
MCH RBC QN AUTO: 30.2 PG (ref 26–34)
MCHC RBC AUTO-ENTMCNC: 34.3 G/DL (ref 30–36.5)
MCV RBC AUTO: 88.1 FL (ref 80–99)
NRBC # BLD: 0 K/UL (ref 0–0.01)
NRBC BLD-RTO: 0 PER 100 WBC
PLATELET # BLD AUTO: 94 K/UL (ref 150–400)
PMV BLD AUTO: 10 FL (ref 8.9–12.9)
POTASSIUM SERPL-SCNC: 4.4 MMOL/L (ref 3.5–5.1)
RBC # BLD AUTO: 4.04 M/UL (ref 4.1–5.7)
SODIUM SERPL-SCNC: 137 MMOL/L (ref 136–145)
WBC # BLD AUTO: 6.1 K/UL (ref 4.1–11.1)

## 2024-06-14 PROCEDURE — 99233 SBSQ HOSP IP/OBS HIGH 50: CPT | Performed by: INTERNAL MEDICINE

## 2024-06-14 PROCEDURE — 93005 ELECTROCARDIOGRAM TRACING: CPT | Performed by: NURSE PRACTITIONER

## 2024-06-14 PROCEDURE — 93010 ELECTROCARDIOGRAM REPORT: CPT | Performed by: INTERNAL MEDICINE

## 2024-06-14 PROCEDURE — 80048 BASIC METABOLIC PNL TOTAL CA: CPT

## 2024-06-14 PROCEDURE — 33361 REPLACE AORTIC VALVE PERQ: CPT | Performed by: THORACIC SURGERY (CARDIOTHORACIC VASCULAR SURGERY)

## 2024-06-14 PROCEDURE — 93306 TTE W/DOPPLER COMPLETE: CPT | Performed by: INTERNAL MEDICINE

## 2024-06-14 PROCEDURE — 93306 TTE W/DOPPLER COMPLETE: CPT

## 2024-06-14 PROCEDURE — 85027 COMPLETE CBC AUTOMATED: CPT

## 2024-06-14 PROCEDURE — 36415 COLL VENOUS BLD VENIPUNCTURE: CPT

## 2024-06-14 PROCEDURE — 93325 DOPPLER ECHO COLOR FLOW MAPG: CPT | Performed by: INTERNAL MEDICINE

## 2024-06-14 PROCEDURE — APPSS45 APP SPLIT SHARED TIME 31-45 MINUTES: Performed by: NURSE PRACTITIONER

## 2024-06-14 PROCEDURE — 93308 TTE F-UP OR LMTD: CPT | Performed by: INTERNAL MEDICINE

## 2024-06-14 ASSESSMENT — PAIN SCALES - GENERAL: PAINLEVEL_OUTOF10: 0

## 2024-06-14 NOTE — PROGRESS NOTES
Cardiac Surgery Care Coordinator-  Met with Aakash Faust and his wife, transcatheter educational folder at the bedside.  Reviewed plan of care and discussed planned discharge later today. Encouraged continued use of the incentive spirometer. Reviewed the importance of daily temp and weight monitoring, discussed groin site care and reviewed signs and symptoms of infection.  Red reminder bracelet on left wrist, reviewed purpose of the bracelet and when to call the MD. Provided him with his valve identification card and dental prophylaxis card.  Discussed the purpose of both cards.  Reminded pt of appts and encouraged participation in the Cardiac Wellness and rehab program after discharge.  Encouraged them to verbalize and emotional support given.They are without questions or concerns at this time.

## 2024-06-14 NOTE — ANESTHESIA POSTPROCEDURE EVALUATION
Post-Anesthesia Evaluation and Assessment    Patient: Aakash Faust MRN: [id number]  SSN: [id number]    YOB: 1945  Age: 78 y.o.  Sex: male      I have evaluated the patient and they are stable and ready for discharge from the PACU.     Cardiovascular Function/Vital Signs  Visit Vitals  /60   Pulse 69   Temp 98 °F (36.7 °C) (Oral)   Resp 24   Ht 1.803 m (5' 10.98\")   Wt 81.1 kg (178 lb 12.7 oz)   SpO2 94%   BMI 24.95 kg/m²       Patient is status post Monitor Anesthesia Care anesthesia for Procedure(s):  TRANSCATHETER AORTIC VALVE REPLACEMENT RIGHT TRANSFEMORAL APPROACH  26 S 3  ..    Nausea/Vomiting: None    Postoperative hydration reviewed and adequate.    Pain:  Managed    Neurological Status:   At baseline    Mental Status, Level of Consciousness: Alert and  oriented to person, place, and time    Pulmonary Status:   Adequate oxygenation and airway patent    Complications related to anesthesia: None    Post-anesthesia assessment completed. No concerns    Signed By: Jeovanny Pop MD     June 14, 2024

## 2024-06-14 NOTE — PROGRESS NOTES
Cardiac Surgery FLOOR Progress Note    Admit Date: 2024  POD: 1 Day Post-Op      Procedure:  Procedure(s):  TRANSCATHETER AORTIC VALVE REPLACEMENT RIGHT TRANSFEMORAL APPROACH  26 S 3  .    Subjective:   Patient seen with Dr. Dukes. Afebrile, room air. Patient's wife at the bedside.     Objective:     /66   Pulse 76   Temp 98 °F (36.7 °C) (Oral)   Resp 17   Ht 1.803 m (5' 10.98\")   Wt 81.1 kg (178 lb 12.7 oz)   SpO2 93%   BMI 24.95 kg/m²   Temp (24hrs), Av.5 °F (36.4 °C), Min:96.5 °F (35.8 °C), Max:98.2 °F (36.8 °C)      Last 24hr Input/Output:    Intake/Output Summary (Last 24 hours) at 2024 0909  Last data filed at 2024 0331  Gross per 24 hour   Intake 1526 ml   Output 750 ml   Net 776 ml        EKG/Rhythm:   Encounter Date: 24   EKG 12 lead   Result Value    Ventricular Rate 75    Atrial Rate 75    P-R Interval 138    QRS Duration 160    Q-T Interval 444    QTc Calculation (Bazett) 495    P Axis 64    R Axis 211    T Axis 14    Diagnosis      Atrial-sensed ventricular-paced rhythm  Biventricular pacemaker detected  Abnormal ECG  When compared with ECG of 2024 12:11,  Vent. rate has increased BY  15 BPM  Confirmed by Tom JUAN, [facility] ([id number]) on 2024 8:35:03 AM           Oxygen: Room Air    CXR: Xray Result (most recent):  XR CHEST PORTABLE 2024    Narrative  EXAM:  XR CHEST PORTABLE    INDICATION: Post TAVR    COMPARISON: 2024    TECHNIQUE: 1200 hours portable chest AP view    FINDINGS: Status post median sternotomy. ICD remains in place. Transcatheter  aortic valve prosthesis is noted. Heart size is at the upper limits of normal.  Lungs are clear. No pneumothorax.    Osseous structures are unremarkable.    Impression  1. Status post TAVR.  2. The lungs are clear    Electronically signed by STEVE RIVAS          Admission Weight: Last Weight   Weight - Scale: 83 kg (182 lb 15.7 oz) Weight - Scale: 81.1 kg (178 lb 12.7 oz)       EXAM:  General: No

## 2024-06-14 NOTE — DISCHARGE SUMMARY
Cardiac Surgery Discharge Summary     Patient ID:  Aakash Faust  [id number]  78 y.o.  1945    Admit date: 6/13/2024    Discharge date: 6/14/2024     Admitting Physician: Raghav Dukes MD     Referring Cardiologist:  Dr. Jerome    PCP:  [facility]    Admitting Diagnoses: Aortic stenosis    Discharge Diagnoses: AS s/p TAVR    1. Non-rheumatic aortic stenosis    2. Aortic stenosis        Discharged Condition: Stable    Disposition: home, see patient instructions for treatment and plan    Procedures for this admission:  Procedure(s):  TRANSCATHETER AORTIC VALVE REPLACEMENT RIGHT TRANSFEMORAL APPROACH  26 S 3  .    Discharge Medications:      Medication List        START taking these medications      losartan 25 MG tablet  Commonly known as: COZAAR  Take 1 tablet by mouth daily            CHANGE how you take these medications      MAGNESIUM GLUCONATE PO  What changed: Another medication with the same name was removed. Continue taking this medication, and follow the directions you see here.            CONTINUE taking these medications      acetaminophen 325 MG tablet  Commonly known as: TYLENOL     aspirin 81 MG EC tablet     DICLOFENAC PO     donepezil 10 MG tablet  Commonly known as: ARICEPT     melatonin 3 MG Tabs tablet     tamsulosin 0.4 MG capsule  Commonly known as: FLOMAX              HPI: Copied from previous H&P dated 6/5/24    Aakash Faust is a 78 y.o. male with PMHx of AS, CAD s/p CABG, chronic systolic HF s/p BiV ICD, dementia that is referred to [facility] by Dr. Jerome for interventional evaluation of severe AS.     Main complaint that prompted diagnosis is fatigue- wife notes that he has been laying down around the house most of the time, whereas he used to be very active (jogger, played tennis) up to a few years ago. He also occasionally gets dizzy with standing and SOB with exertion. Denies CP and LE edema     Past medical/surgical history

## 2024-06-14 NOTE — PROGRESS NOTES
Dr. Justus Samaniego [facility] Cardiology.  [phone]            Cardiology structural heart disease consult/Progress Note      Requesting/referring provider: [facility]    Reason for Consult: Aortic valve disease    Assessment/Plan:  1.  Moderate to severe possibly aortic stenosis with recent invasive assessment suggesting classic low-flow low gradient severe aortic stenosis  2.  Coronary disease status post prior coronary bypass grafting  3.  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  Frequent PVCs  5.  Forgetfulness/ memory loss/gait disturbances, neurology concerned about her degenerative condition  6.  History of hyperlipidemia, statin discontinued because of forgetfulness  7.  Frequent PVCs on clinical examination        Aakash Faust is seen at the request of Dr. Murcia for evaluating his valvular heart disease.  He underwent transcatheter aortic valve replacement with 26 mm Russell prosthesis.  He is doing well this morning.  No further dizziness or lightheadedness.  Echocardiogram demonstrates appropriate gradients across the valve with mean continued BiV pacing.  He does have EF of about 20% which is one of the main reasons for shortness of breath and will require GDMT for heart failure with reduced ejection fraction.        Investigations ordered    []    High complexity decision making was performed  []    Patient is at high-risk of decompensation with multiple organ involvement  []    Complex/difficult social determinants of health outcomes  Total of ** minutes were spent on reviewing the records, analyzing investigations and documentation in the chart, on the day of visit including time for examination and time spent with the patient  Investigations personally reviewed and interpreted  Outside records reviewed: Echocardiogram EF 35 to 40%.,  Previous EF on echocardiogram in 2019 was 50%.

## 2024-06-14 NOTE — OP NOTE
Cardiothoracic Surgery Operative Note    Date of Dictation: 06/14/24    Date of Procedure: 06/13/24    Referring Physician: Neymar Jerome MD.    Preoperative Diagnosis:    1. Severe, symptomatic aortic stenosis (NYHA, Class III)  2. No Coronary artery disease  3. Severe Cardiomyopathy    Postoperative Diagnosis:    1. Status post successful implantation of a 26 mm Russell S3 aortic valve via the right transfemoral approach  2. No significant post operative conduction block    Indication:    Aakash Faust is a 78 y.o. ?old male  with severe symptomatic aortic stenosis, NYHA Class III symptom status.? He referred for transfemoral TAVR procedure. He was deemed as high risk for SAVR.     Procedure:    1. Angiography of the ascending aorta and aortoiliac bifurcation  2. Temporary transvenous pacemaker insertion  3. Left heart catheterization  4. Implantation of a 26 mm Russell S3 right transfemoral approach  5. 16F right?femoral artery access with Perclose pre-closure / 6F left femoral vein access with manual compression / 6F left femoral artery access with Perclose closure    Surgeon: Raghav Dukes MD, PhD, [facility], [facility].    Co-Surgeon:  Neymar Jerome MD.    Anesthesia: MAC with conscious sedation.    Anesthesiologist(s):  Franco Pop MD.    The risks (death, myocardial infarction, stroke, bleeding, vascular complications, renal dysfunction, allergic reactions, and other), benefits, and alternatives were explained and discussed. Signed, informed consent was obtained. The patient was placed on the table and prepped and draped in the usual fashion.    Arterial Access:    - Right: Right femoral artery access was obtained using ultrasound and a micropuncture needle and sheath system.? Angiography confirmed adequate position within the mid right CFA without significant angiographic stenosis or irregularity. Pre-close technique was performed using two orthogonally positioned Perclose closure devices.? A 6F sheath was

## 2024-06-14 NOTE — CARE COORDINATION
ABBY    Met w patient and wife at bedside to discuss the d/c plan w going home and to review an important message from Medicare. Patient verbalized understanding and gave permission for possible discharge within 4 hours of receiving IMM. Wife reports the plan is cardiac rehab and all questions have been answered.  CM wished family well.     REGINALDO Isbell CCM  Care Management

## 2024-06-14 NOTE — PLAN OF CARE
1930: Bedside and Verbal shift change report given to Karli RN (oncoming nurse) by Jenny RN (offgoing nurse). Report included the following information Nurse Handoff Report, Index, Adult Overview, Intake/Output, MAR, and Cardiac Rhythm V-Paced .       0730: Bedside and Verbal shift change report given to Jenny RN (oncoming nurse) by Karli RN (offgoing nurse). Report included the following information Nurse Handoff Report, Index, Adult Overview, Intake/Output, MAR, and Cardiac Rhythm V-Paced.     Care Plan:  Problem: Pain  Goal: Verbalizes/displays adequate comfort level or baseline comfort level  Outcome: Progressing  Flowsheets (Taken 6/13/2024 2000)  Verbalizes/displays adequate comfort level or baseline comfort level: Encourage patient to monitor pain and request assistance     Problem: Safety - Adult  Goal: Free from fall injury  Outcome: Progressing     Problem: Discharge Planning  Goal: Discharge to home or other facility with appropriate resources  Outcome: Progressing  Flowsheets (Taken 6/13/2024 2000)  Discharge to home or other facility with appropriate resources: Identify barriers to discharge with patient and caregiver     Problem: ABCDS Injury Assessment  Goal: Absence of physical injury  Outcome: Progressing

## 2024-06-14 NOTE — PROGRESS NOTES
Referral source:   Aakash Faust at [facility] in [facility] 4 CV SERVICES UNIT.  attended rounds on the CV Services Unit as part of the Interdisciplinary team where the patient's ongoing care was discussed. I reviewed the medical record as part of this encounter.     Outcome: Interdisciplinary team are aware of  availability and were encouraged to request Spiritual Health support as needed.      The  on-call can be reached at [phone]).     Rev. Delia Najera MDiv, [facility]  Staff

## 2024-06-14 NOTE — DISCHARGE INSTRUCTIONS
Cardiac Surgery Specialist    [address]       [address]      [place] [address]                                       [place] [address]  Office- [phone]  Fax- [phone]       Office- [phone]  Fax- [phone]  _____________________________________________________________  Dr. Yoni Burns, NP    Sapna Martínez, PARERE Ortiz, NP  Dr. Dennis Aaron,LUIS Sahu, LUIS Atkins, LUIS Jensen, ANNE MARIE Reyes, LUIS Madsen, NP  ______________________________________________________________     Name:Aakash Faust     Surgery & Date: Procedure(s):  TRANSCATHETER AORTIC VALVE REPLACEMENT RIGHT TRANSFEMORAL APPROACH  26 S 3  .    Discharge Date: 06/14/24     MEDICATIONS:  Please refer to your After Visit Summary for your medication list.       DO NOT TAKE ANY MEDICATIONS THAT ARE NOT ON THIS LIST    INSTRUCTIONS:  NO SMOKING OR TOBACCO PRODUCTS  Do not follow the activity/exercise instructions in your discharge book given to you as an inpatient. You have no activity restrictions.   You may shower.  Wash all incisions twice daily with mild soap and water.  No lotions, ointments or powder.  Call the office immediately for any redness, swelling, or drainage from your incision.   Take your temperature daily and call for a temperature of 101 degrees or higher or for any symptoms that make you think you have and infection.  Weigh yourself each morning.  Call if you gain more than 5 pounds in 48 hours.  Use the incentive spirometer 6-8 times a day-10 breaths each time.    Walk several hundred feet several times daily.    DIET  Eat an American Heart Association diet.  If you are having trouble with your appetite, eat what you can.  Try eating small, frequent meals throughout the day.        ACTIVITY  1. You

## 2024-06-14 NOTE — PROGRESS NOTES
1200: Discussed with the patient and all questioned fully answered. He will call me if any problems arise.      Medication List        START taking these medications      losartan 25 MG tablet  Commonly known as: COZAAR  Take 1 tablet by mouth daily            CHANGE how you take these medications      MAGNESIUM GLUCONATE PO  What changed: Another medication with the same name was removed. Continue taking this medication, and follow the directions you see here.            CONTINUE taking these medications      acetaminophen 325 MG tablet  Commonly known as: TYLENOL     aspirin 81 MG EC tablet     DICLOFENAC PO     donepezil 10 MG tablet  Commonly known as: ARICEPT     melatonin 3 MG Tabs tablet     tamsulosin 0.4 MG capsule  Commonly known as: FLOMAX

## 2024-06-17 ENCOUNTER — OFFICE VISIT (OUTPATIENT)
Age: 79
End: 2024-06-17
Payer: MEDICARE

## 2024-06-17 DIAGNOSIS — Z95.2 S/P TAVR (TRANSCATHETER AORTIC VALVE REPLACEMENT): ICD-10-CM

## 2024-06-17 DIAGNOSIS — I35.0 NON-RHEUMATIC AORTIC STENOSIS: Primary | ICD-10-CM

## 2024-06-17 PROCEDURE — 99442 PR PHYS/QHP TELEPHONE EVALUATION 11-20 MIN: CPT | Performed by: NURSE PRACTITIONER

## 2024-06-17 NOTE — PROGRESS NOTES
Patient: Aakash Faust   Age: 78 y.o.     Patient Care Team:  [facility] as PCP - Neymar Redman MD as Consulting Physician (Cardiology)  Raghav Dukes MD as Consulting Physician (Cardiothoracic Surgery)  Yoni Garcia MD (Urology)    PCP: [facility]    Cardiologist: Dr. Jerome    Diagnosis/Reason for Consultation: The primary encounter diagnosis was Non-rheumatic aortic stenosis. A diagnosis of S/P TAVR (transcatheter aortic valve replacement) was also pertinent to this visit.    Problem List:   Patient Active Problem List   Diagnosis    Coronary atherosclerosis    Mixed hyperlipidemia    Aortic stenosis    Cardiomyopathy (HCC)    Chronic systolic heart failure (HCC)    Biventricular ICD (implantable cardioverter-defibrillator) in place    Non-rheumatic aortic stenosis    S/P TAVR (transcatheter aortic valve replacement)         HPI: 78 y.o.  male  S/P TRANSCATHETER AORTIC VALVE REPLACEMENT RIGHT TRANSFEMORAL APPROACH  26 S 3  . on 6/13/24 with Dr. Jerome and Dr. Dukes. Patient was transferred to the PACU in stable condition on no gtts. The patient's post operative course was uncomplicated. The patient was stable and ready for discharge on 06/14/24 . He is available by telephone with his wife and daughter for his initial postoperative visit.     He has been fatigued since getting home.  He has been taking naps daily. Denies fever, chills, worsening shortness of breath, chest pain, orthopnea, PND, dizziness, syncope or recent fall, or issues with his incisions.  They have a new scale and just started using it this morning so no real comparison. BP has improved so that his SBP has been consistently above 100.  He has resumed his Losartan.     NYHA Classification: IIB   Class I (Mild): No limitation of physical activity. Ordinary physical activity does not cause undue fatigue, palpitation, or dyspnea.   Class II (Mild): Slight limitation of

## 2024-06-26 ENCOUNTER — HOSPITAL ENCOUNTER (OUTPATIENT)
Facility: HOSPITAL | Age: 79
Setting detail: RECURRING SERIES
Discharge: HOME | End: 2024-06-29
Attending: THORACIC SURGERY (CARDIOTHORACIC VASCULAR SURGERY)
Payer: MEDICARE

## 2024-06-26 VITALS — WEIGHT: 183 LBS | HEIGHT: 71 IN | BODY MASS INDEX: 25.62 KG/M2

## 2024-06-26 PROCEDURE — 93797 PHYS/QHP OP CAR RHAB WO ECG: CPT

## 2024-06-26 PROCEDURE — 93798 PHYS/QHP OP CAR RHAB W/ECG: CPT

## 2024-06-26 ASSESSMENT — PATIENT HEALTH QUESTIONNAIRE - PHQ9
9. THOUGHTS THAT YOU WOULD BE BETTER OFF DEAD, OR OF HURTING YOURSELF: NOT AT ALL
7. TROUBLE CONCENTRATING ON THINGS, SUCH AS READING THE NEWSPAPER OR WATCHING TELEVISION: SEVERAL DAYS
6. FEELING BAD ABOUT YOURSELF - OR THAT YOU ARE A FAILURE OR HAVE LET YOURSELF OR YOUR FAMILY DOWN: NOT AT ALL
SUM OF ALL RESPONSES TO PHQ QUESTIONS 1-9: 9
SUM OF ALL RESPONSES TO PHQ QUESTIONS 1-9: 9
1. LITTLE INTEREST OR PLEASURE IN DOING THINGS: NOT AT ALL
SUM OF ALL RESPONSES TO PHQ QUESTIONS 1-9: 9
SUM OF ALL RESPONSES TO PHQ9 QUESTIONS 1 & 2: 1
10. IF YOU CHECKED OFF ANY PROBLEMS, HOW DIFFICULT HAVE THESE PROBLEMS MADE IT FOR YOU TO DO YOUR WORK, TAKE CARE OF THINGS AT HOME, OR GET ALONG WITH OTHER PEOPLE: EXTREMELY DIFFICULT
4. FEELING TIRED OR HAVING LITTLE ENERGY: NEARLY EVERY DAY
3. TROUBLE FALLING OR STAYING ASLEEP: SEVERAL DAYS
8. MOVING OR SPEAKING SO SLOWLY THAT OTHER PEOPLE COULD HAVE NOTICED. OR THE OPPOSITE, BEING SO FIGETY OR RESTLESS THAT YOU HAVE BEEN MOVING AROUND A LOT MORE THAN USUAL: SEVERAL DAYS
SUM OF ALL RESPONSES TO PHQ QUESTIONS 1-9: 9
5. POOR APPETITE OR OVEREATING: MORE THAN HALF THE DAYS
2. FEELING DOWN, DEPRESSED OR HOPELESS: SEVERAL DAYS

## 2024-06-26 ASSESSMENT — EXERCISE STRESS TEST
PEAK_METS: 2
PEAK_RPE: 11
PEAK_BP: 123/75
PEAK_BP: 123/75
PEAK_HR: 84

## 2024-06-26 ASSESSMENT — LIFESTYLE VARIABLES: SMOKELESS_TOBACCO: NO

## 2024-06-26 ASSESSMENT — EJECTION FRACTION: EF_VALUE: 15

## 2024-06-26 NOTE — CARDIO/PULMONARY
INTAKE APPOINTMENT NOTE  2024    NAME: Aakash Faust : 1945 AGE: 78 y.o.  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]    REFERRING PHYSICIAN: Raghav Dukes, *    MEDICAL HX:  Past Medical History:   Diagnosis Date    Coronary atherosclerosis of unspecified type of vessel, native or graft 2010    Dementia (HCC)     MORE SHORT TERM    History of kidney stones     Mixed hyperlipidemia     Neuropathy of lower extremity     RT LEG AND SOMETIMES AFFECTS GAIT    Prostate cancer (HCC)        LABS:     No results found for: \"HBA1C\", \"[id number]\"  No results found for: \"CHOL\", \"CHOLPOCT\", \"CHLST\", \"CHOLV\", \"HDL\", \"HDLPOC\", \"HDLC\", \"LDL\", \"VLDLC\", \"VLDL\"      ANTHROPOMETRICS:      Ht Readings from Last 1 Encounters:   24 1.803 m (5' 10.98\")      Wt Readings from Last 1 Encounters:   24 83 kg (183 lb)        WAIST: 37.5       VISIT SUMMARY:    Aakash Faust 78 y.o. 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 EF is 15 % Cardiac risk factors include family history, dyslipidemia.  Patient is a nonsmoker having quit 44 years ago.     Aakash Faust lives with his wife. Patient was evaluated for depression using the PHQ-9 assessment tool with a result of 9 which is considered moderate. The result was discussed with patient.    Patient denied chest pain or SOB during 6 minute ETT and was in av/vpaced,rare pvc. Patient reached 86 avg rpm, 935 steps and 30 avg gabriel for a final MET level of 2. Exercise prescription developed using exercise tolerance results and patient stated goals, to be supplemented with home exercise recommendations. Education manual given to patient and reviewed. Patient will attend cardiac rehab 2 times/week which will include  exercise only. They declined educational classes.    Intake Start Time:

## 2024-06-28 ENCOUNTER — HOSPITAL ENCOUNTER (OUTPATIENT)
Facility: HOSPITAL | Age: 79
Setting detail: RECURRING SERIES
Discharge: HOME | End: 2024-07-01
Attending: THORACIC SURGERY (CARDIOTHORACIC VASCULAR SURGERY)
Payer: MEDICARE

## 2024-06-28 VITALS — BODY MASS INDEX: 25.57 KG/M2 | WEIGHT: 183.2 LBS

## 2024-06-28 PROCEDURE — 93798 PHYS/QHP OP CAR RHAB W/ECG: CPT

## 2024-06-28 ASSESSMENT — EXERCISE STRESS TEST
PEAK_RPE: 12
PEAK_METS: 2

## 2024-07-01 ENCOUNTER — HOSPITAL ENCOUNTER (OUTPATIENT)
Facility: HOSPITAL | Age: 79
Setting detail: RECURRING SERIES
Discharge: HOME | End: 2024-07-04
Attending: THORACIC SURGERY (CARDIOTHORACIC VASCULAR SURGERY)
Payer: MEDICARE

## 2024-07-01 VITALS — BODY MASS INDEX: 25.79 KG/M2 | WEIGHT: 184.8 LBS

## 2024-07-01 PROCEDURE — 93798 PHYS/QHP OP CAR RHAB W/ECG: CPT

## 2024-07-01 ASSESSMENT — EXERCISE STRESS TEST
PEAK_RPE: 12
PEAK_METS: 3.5

## 2024-07-03 ENCOUNTER — APPOINTMENT (OUTPATIENT)
Facility: HOSPITAL | Age: 79
End: 2024-07-03
Attending: THORACIC SURGERY (CARDIOTHORACIC VASCULAR SURGERY)
Payer: MEDICARE

## 2024-07-05 ENCOUNTER — HOSPITAL ENCOUNTER (OUTPATIENT)
Facility: HOSPITAL | Age: 79
Setting detail: RECURRING SERIES
Discharge: HOME | End: 2024-07-08
Attending: THORACIC SURGERY (CARDIOTHORACIC VASCULAR SURGERY)
Payer: MEDICARE

## 2024-07-05 VITALS — WEIGHT: 182.2 LBS | BODY MASS INDEX: 25.43 KG/M2

## 2024-07-05 PROCEDURE — 93798 PHYS/QHP OP CAR RHAB W/ECG: CPT

## 2024-07-05 ASSESSMENT — EXERCISE STRESS TEST
PEAK_METS: 3.5
PEAK_RPE: 12

## 2024-07-12 ENCOUNTER — APPOINTMENT (OUTPATIENT)
Facility: HOSPITAL | Age: 79
End: 2024-07-12
Attending: THORACIC SURGERY (CARDIOTHORACIC VASCULAR SURGERY)
Payer: MEDICARE

## 2024-07-15 ENCOUNTER — HOSPITAL ENCOUNTER (OUTPATIENT)
Facility: HOSPITAL | Age: 79
Setting detail: RECURRING SERIES
Discharge: HOME | End: 2024-07-18
Attending: THORACIC SURGERY (CARDIOTHORACIC VASCULAR SURGERY)
Payer: MEDICARE

## 2024-07-15 VITALS — BODY MASS INDEX: 26.01 KG/M2 | WEIGHT: 186.4 LBS

## 2024-07-15 PROCEDURE — 93798 PHYS/QHP OP CAR RHAB W/ECG: CPT

## 2024-07-15 ASSESSMENT — EXERCISE STRESS TEST
PEAK_RPE: 12
PEAK_METS: 3.5

## 2024-07-18 ENCOUNTER — HOSPITAL ENCOUNTER (OUTPATIENT)
Facility: HOSPITAL | Age: 79
Discharge: HOME | End: 2024-07-20
Payer: MEDICARE

## 2024-07-18 ENCOUNTER — OFFICE VISIT (OUTPATIENT)
Age: 79
End: 2024-07-18
Payer: MEDICARE

## 2024-07-18 ENCOUNTER — APPOINTMENT (OUTPATIENT)
Facility: HOSPITAL | Age: 79
End: 2024-07-18
Attending: THORACIC SURGERY (CARDIOTHORACIC VASCULAR SURGERY)
Payer: MEDICARE

## 2024-07-18 VITALS
OXYGEN SATURATION: 97 % | BODY MASS INDEX: 26.03 KG/M2 | SYSTOLIC BLOOD PRESSURE: 110 MMHG | HEART RATE: 70 BPM | WEIGHT: 186.5 LBS | DIASTOLIC BLOOD PRESSURE: 70 MMHG

## 2024-07-18 DIAGNOSIS — Z95.2 S/P TAVR (TRANSCATHETER AORTIC VALVE REPLACEMENT): Primary | ICD-10-CM

## 2024-07-18 DIAGNOSIS — I35.0 NON-RHEUMATIC AORTIC STENOSIS: ICD-10-CM

## 2024-07-18 DIAGNOSIS — I35.0 NONRHEUMATIC AORTIC VALVE STENOSIS: ICD-10-CM

## 2024-07-18 LAB
ECHO AV AREA PEAK VELOCITY: 1.5 CM2
ECHO AV AREA VTI: 1.4 CM2
ECHO AV MEAN GRADIENT: 14 MMHG
ECHO AV MEAN VELOCITY: 1.8 M/S
ECHO AV PEAK GRADIENT: 22 MMHG
ECHO AV PEAK VELOCITY: 2.3 M/S
ECHO AV VELOCITY RATIO: 0.43
ECHO AV VTI: 54.7 CM
ECHO EST RA PRESSURE: 3 MMHG
ECHO LA DIAMETER: 4.5 CM
ECHO LA VOL A-L A4C: 78 ML (ref 18–58)
ECHO LA VOL MOD A4C: 75 ML (ref 18–58)
ECHO LV FRACTIONAL SHORTENING: 21 % (ref 28–44)
ECHO LV INTERNAL DIMENSION DIASTOLIC: 5.7 CM (ref 4.2–5.9)
ECHO LV INTERNAL DIMENSION SYSTOLIC: 4.5 CM
ECHO LV IVSD: 1.1 CM (ref 0.6–1)
ECHO LV MASS 2D: 241.3 G (ref 88–224)
ECHO LV POSTERIOR WALL DIASTOLIC: 1 CM (ref 0.6–1)
ECHO LV RELATIVE WALL THICKNESS RATIO: 0.35
ECHO LVOT AREA: 3.5 CM2
ECHO LVOT AV VTI INDEX: 0.38
ECHO LVOT DIAM: 2.1 CM
ECHO LVOT MEAN GRADIENT: 2 MMHG
ECHO LVOT PEAK GRADIENT: 4 MMHG
ECHO LVOT PEAK VELOCITY: 1 M/S
ECHO LVOT SV: 72.7 ML
ECHO LVOT VTI: 21 CM
ECHO MV A VELOCITY: 0.52 M/S
ECHO MV E VELOCITY: 0.36 M/S
ECHO MV E/A RATIO: 0.69
ECHO RV INTERNAL DIMENSION: 3.8 CM
ECHO RV TAPSE: 1 CM (ref 1.7–?)

## 2024-07-18 PROCEDURE — 1123F ACP DISCUSS/DSCN MKR DOCD: CPT | Performed by: NURSE PRACTITIONER

## 2024-07-18 PROCEDURE — G8419 CALC BMI OUT NRM PARAM NOF/U: HCPCS | Performed by: NURSE PRACTITIONER

## 2024-07-18 PROCEDURE — G8427 DOCREV CUR MEDS BY ELIG CLIN: HCPCS | Performed by: NURSE PRACTITIONER

## 2024-07-18 PROCEDURE — 1036F TOBACCO NON-USER: CPT | Performed by: NURSE PRACTITIONER

## 2024-07-18 PROCEDURE — 99214 OFFICE O/P EST MOD 30 MIN: CPT | Performed by: NURSE PRACTITIONER

## 2024-07-18 PROCEDURE — 93306 TTE W/DOPPLER COMPLETE: CPT

## 2024-07-18 ASSESSMENT — KANSAS CITY CARDIOMYOPATHY QUESTIONNAIRE (KCCQ12)
8B. OVER THE PAST 2 WEEKS, ON AVERAGE, HOW HAS HEART FAILURE LIMITED YOU ABILITY TO WORK OR DO HOUSEHOLD CHORES: MODERATELY LIMITED
1B. OVER THE PAST 2 WEEKS, HOW MUCH WERE YOU LIMITED BY HEART FAILURE SYMPTOMS (SHORTNESS OF BREATH OR FATIGUE) WHEN WALKING 1 BLOCK ON LEVEL GROUND: MODERATELY LIMITED
8C. OVER THE PAST 2 WEEKS, ON AVERAGE, HOW HAS HEART FAILURE LIMITED YOU ABILITY TO VISIT FAMILY AND FRIENDS OUR OF YOUR HOME: SLIGHTLY LIMITED
1A. OVER THE PAST 2 WEEKS, HOW MUCH WERE YOU LIMITED BY HEART FAILURE SYMPTOMS (SHORTNESS OF BREATH OR FATIGUE) WHEN SHOWERING OR BATHING: NOT AT ALL LIMITED
TOTAL SCORE: 45
5. OVER THE PAST 2 WEEKS, ON AVERAGE, HOW MANY TIMES HAVE YOU BEEN FORCED TO SLEEP SITTING UP IN A CHAIR OR WITH AT LEAST 3 PILLOWS TO PROP YOU UP BECAUSE OF SHORTNESS OF BREATH?: NEVER OVER THE PAST 2 WEEKS
8A. OVER THE PAST 2 WEEKS, ON AVERAGE, HOW HAS HEART FAILURE LIMITED YOU ABILITY TO DO HOBBIES OR RECREATIONAL ACTIVITIES: MODERATELY LIMITED
2. OVER THE PAST 2 WEEKS, HOW MANY TIMES DID YOU HAVE SWELLING IN YOUR FEET, ANKLES OR LEGS WHEN YOU WOKE UP IN THE MORNING: NEVER OVER THE PAST 2 WEEKS
3. OVER THE PAST 2 WEEKS, ON AVERAGE, HOW MANY TIMES HAS FATIGUE LIMITED YOUR ABILITY TO DO WHAT YOU WANTED: AT LEAST ONCE A DAY
6. OVER THE PAST 2 WEEKS, HOW MUCH HAS YOUR HEART FAILURE LIMITED YOUR ENJOYMENT OF LIFE: IT HAS SLIGHTLY LIMITED MY ENJOYMENT OF LIFE
4. OVER THE PAST 2 WEEKS, ON AVERAGE, HOW MANY TIMES HAS SHORTNESS OF BREATH LIMITED YOUR ABILITY TO DO WHAT YOU WANTED: 3 OR MORE TIMES PER WEEK BUT NOT EVERY DAY
7. IF YOU HAD TO SPEND THE REST OF YOUR LIFE WITH YOUR HEART FAILURE THE WAY IT IS RIGHT NOW, HOW WOULD YOU FEEL ABOUT THIS?: MOSTLY SATISFIED
1C. OVER THE PAST 2 WEEKS, HOW MUCH WERE YOU LIMITED BY HEART FAILURE SYMPTOMS (SHORTNESS OF BREATH OR FATIGUE) WHEN HURRYING OR JOGGING (AS IF TO CATCH A BUS): QUITE A BIT LIMITED

## 2024-07-18 NOTE — PROGRESS NOTES
Patient: Aakash Faust   Age: 78 y.o.     Patient Care Team:  [facility] as PCP - Neymar Redman MD as Consulting Physician (Cardiology)  Raghav Dukes MD as Consulting Physician (Cardiothoracic Surgery)  Yoni Garcia MD (Urology)    PCP: [facility]    Cardiologist: Dr. Jerome    Diagnosis/Reason for Consultation: The primary encounter diagnosis was S/P TAVR (transcatheter aortic valve replacement). A diagnosis of Non-rheumatic aortic stenosis was also pertinent to this visit.    Problem List:   Patient Active Problem List   Diagnosis    Coronary atherosclerosis    Mixed hyperlipidemia    Aortic stenosis    Cardiomyopathy (HCC)    Chronic systolic heart failure (HCC)    Biventricular ICD (implantable cardioverter-defibrillator) in place    Non-rheumatic aortic stenosis    S/P TAVR (transcatheter aortic valve replacement)         HPI: 78 y.o.  male  S/P TRANSCATHETER AORTIC VALVE REPLACEMENT RIGHT TRANSFEMORAL APPROACH  26 S 3. on 6/13/24 with Dr. Jerome and Dr. Dukes. Patient was transferred to the PACU in stable condition on no gtts. The patient's post operative course was uncomplicated. The patient was stable and ready for discharge on 06/14/24 . he  is here today for his  One Month Post Op Appointment.    He has started cardiac rehab.  He has been walking at the beach and walking laps in the pool but not everyday.  His wife states that fatigue is his limiting factor.  He is still short of breath with some exercise. Denies fever, chills, chest pain, orthopnea, PND, dizziness, syncope or recent fall.      Any dental pain/concerns: none    NYHA Classification: IIB   Class I (Mild): No limitation of physical activity. Ordinary physical activity does not cause undue fatigue, palpitation, or dyspnea.   Class II (Mild): Slight limitation of physical activity. Comfortable at rest, but ordinary physical activity results in fatigue, palpitation, or

## 2024-07-19 ENCOUNTER — HOSPITAL ENCOUNTER (OUTPATIENT)
Facility: HOSPITAL | Age: 79
Setting detail: RECURRING SERIES
Discharge: HOME | End: 2024-07-22
Attending: THORACIC SURGERY (CARDIOTHORACIC VASCULAR SURGERY)
Payer: MEDICARE

## 2024-07-19 ENCOUNTER — TELEPHONE (OUTPATIENT)
Age: 79
End: 2024-07-19

## 2024-07-19 VITALS — WEIGHT: 185.6 LBS | BODY MASS INDEX: 25.9 KG/M2

## 2024-07-19 PROCEDURE — 93798 PHYS/QHP OP CAR RHAB W/ECG: CPT

## 2024-07-19 NOTE — TELEPHONE ENCOUNTER
----- Message from Neymar Jerome MD sent at 7/18/2024  9:29 PM EDT -----  Needs follow up wit Dr Murcia

## 2024-07-19 NOTE — TELEPHONE ENCOUNTER
Scheduled tentative appointment. Called pt. LM on . Stated I would also send message in [employer].    Future Appointments   Date Time Provider Department Center   7/19/2024  1:00 PM [facility] CARDIAC WELLNESS EXERCISE [facility]   7/29/2024 10:00 AM [facility] CARDIAC WELLNESS EXERCISE [facility]   8/1/2024 10:00 AM [facility] CARDIAC WELLNESS EXERCISE [facility]   8/5/2024 10:00 AM [facility] CARDIAC WELLNESS EXERCISE [facility]   8/6/2024  2:40 PM Aakash Murcia III, MD [facility]   8/8/2024 10:00 AM [facility] CARDIAC WELLNESS EXERCISE [facility]   5/15/2025 10:30 AM [facility] ECHO LAB 1 [facility]   5/15/2025 11:30 AM Erika Atkins APRN - LUIS [facility] BS AMB   6/26/2025  2:40 PM INE[facility] MYERS BS AMB   6/26/2025  3:00 PM Ernst Parmar MD [facility] AMB

## 2024-07-22 ENCOUNTER — HOSPITAL ENCOUNTER (OUTPATIENT)
Facility: HOSPITAL | Age: 79
Setting detail: RECURRING SERIES
Discharge: HOME | End: 2024-07-25
Attending: THORACIC SURGERY (CARDIOTHORACIC VASCULAR SURGERY)
Payer: MEDICARE

## 2024-07-22 VITALS — BODY MASS INDEX: 26.18 KG/M2 | WEIGHT: 187.6 LBS

## 2024-07-22 PROCEDURE — 93798 PHYS/QHP OP CAR RHAB W/ECG: CPT

## 2024-07-24 ENCOUNTER — HOSPITAL ENCOUNTER (OUTPATIENT)
Facility: HOSPITAL | Age: 79
Setting detail: RECURRING SERIES
Discharge: HOME | End: 2024-07-27
Attending: THORACIC SURGERY (CARDIOTHORACIC VASCULAR SURGERY)
Payer: MEDICARE

## 2024-07-24 VITALS — BODY MASS INDEX: 25.84 KG/M2 | WEIGHT: 185.2 LBS

## 2024-07-24 PROCEDURE — 93798 PHYS/QHP OP CAR RHAB W/ECG: CPT

## 2024-07-29 ENCOUNTER — HOSPITAL ENCOUNTER (OUTPATIENT)
Facility: HOSPITAL | Age: 79
Setting detail: RECURRING SERIES
Discharge: HOME | End: 2024-08-01
Attending: THORACIC SURGERY (CARDIOTHORACIC VASCULAR SURGERY)
Payer: MEDICARE

## 2024-07-29 VITALS — BODY MASS INDEX: 25.84 KG/M2 | WEIGHT: 185.2 LBS

## 2024-07-29 PROCEDURE — 93798 PHYS/QHP OP CAR RHAB W/ECG: CPT

## 2024-07-29 ASSESSMENT — EXERCISE STRESS TEST
PEAK_METS: 3.5
PEAK_RPE: 12

## 2024-08-01 ENCOUNTER — HOSPITAL ENCOUNTER (OUTPATIENT)
Facility: HOSPITAL | Age: 79
Setting detail: RECURRING SERIES
Discharge: HOME | End: 2024-08-04
Attending: THORACIC SURGERY (CARDIOTHORACIC VASCULAR SURGERY)
Payer: MEDICARE

## 2024-08-01 VITALS — WEIGHT: 186.4 LBS | BODY MASS INDEX: 26.01 KG/M2

## 2024-08-01 PROCEDURE — 93798 PHYS/QHP OP CAR RHAB W/ECG: CPT

## 2024-08-01 ASSESSMENT — EXERCISE STRESS TEST
PEAK_RPE: 12
PEAK_METS: 3.5

## 2024-08-06 ENCOUNTER — OFFICE VISIT (OUTPATIENT)
Age: 79
End: 2024-08-06
Payer: MEDICARE

## 2024-08-06 ENCOUNTER — HOSPITAL ENCOUNTER (OUTPATIENT)
Facility: HOSPITAL | Age: 79
Setting detail: RECURRING SERIES
Discharge: HOME | End: 2024-08-09
Attending: THORACIC SURGERY (CARDIOTHORACIC VASCULAR SURGERY)
Payer: MEDICARE

## 2024-08-06 VITALS
OXYGEN SATURATION: 95 % | DIASTOLIC BLOOD PRESSURE: 60 MMHG | HEART RATE: 75 BPM | SYSTOLIC BLOOD PRESSURE: 92 MMHG | WEIGHT: 194 LBS | BODY MASS INDEX: 27.07 KG/M2

## 2024-08-06 VITALS — BODY MASS INDEX: 26.15 KG/M2 | WEIGHT: 187.4 LBS

## 2024-08-06 DIAGNOSIS — I35.0 NONRHEUMATIC AORTIC VALVE STENOSIS: ICD-10-CM

## 2024-08-06 DIAGNOSIS — E78.2 MIXED HYPERLIPIDEMIA: ICD-10-CM

## 2024-08-06 DIAGNOSIS — Z95.2 S/P TAVR (TRANSCATHETER AORTIC VALVE REPLACEMENT): Chronic | ICD-10-CM

## 2024-08-06 DIAGNOSIS — Z95.810 BIVENTRICULAR ICD (IMPLANTABLE CARDIOVERTER-DEFIBRILLATOR) IN PLACE: ICD-10-CM

## 2024-08-06 DIAGNOSIS — I25.5 ISCHEMIC CARDIOMYOPATHY: Primary | ICD-10-CM

## 2024-08-06 PROCEDURE — 93798 PHYS/QHP OP CAR RHAB W/ECG: CPT

## 2024-08-06 PROCEDURE — 99214 OFFICE O/P EST MOD 30 MIN: CPT | Performed by: SPECIALIST

## 2024-08-06 PROCEDURE — G8427 DOCREV CUR MEDS BY ELIG CLIN: HCPCS | Performed by: SPECIALIST

## 2024-08-06 PROCEDURE — G8419 CALC BMI OUT NRM PARAM NOF/U: HCPCS | Performed by: SPECIALIST

## 2024-08-06 PROCEDURE — 1036F TOBACCO NON-USER: CPT | Performed by: SPECIALIST

## 2024-08-06 PROCEDURE — 1123F ACP DISCUSS/DSCN MKR DOCD: CPT | Performed by: SPECIALIST

## 2024-08-06 ASSESSMENT — PATIENT HEALTH QUESTIONNAIRE - PHQ9
SUM OF ALL RESPONSES TO PHQ QUESTIONS 1-9: 2
1. LITTLE INTEREST OR PLEASURE IN DOING THINGS: SEVERAL DAYS
SUM OF ALL RESPONSES TO PHQ QUESTIONS 1-9: 2
2. FEELING DOWN, DEPRESSED OR HOPELESS: SEVERAL DAYS
SUM OF ALL RESPONSES TO PHQ9 QUESTIONS 1 & 2: 2

## 2024-08-06 ASSESSMENT — EXERCISE STRESS TEST
PEAK_RPE: 12
PEAK_METS: 3.5

## 2024-08-06 NOTE — PROGRESS NOTES
HISTORY OF PRESENT ILLNESS  Aakash Faust is a 78 y.o. male     SUMMARY:   Patient Active Problem List   Diagnosis    Coronary atherosclerosis    Mixed hyperlipidemia    Aortic stenosis    Cardiomyopathy (HCC)    Chronic systolic heart failure (HCC)    Biventricular ICD (implantable cardioverter-defibrillator) in place    Non-rheumatic aortic stenosis    S/P TAVR (transcatheter aortic valve replacement)            CARDIOLOGY STUDIES TO DATE:  2/9/11. Adenosine cardiolyte. Cardiac stress testing was pos for chest pain and myocardial ischemia. Myocardial perfusion imaging showed reversible deficits.of the ant lat wall Gated EF (%): 49     2/19 negative lexiscan cardiolyte  2/19 echo with lvef 50%, lae, mild mr and tr without pul htn    11/23 [place], cardiolyte stress test with fixed inferior defect, lvef 36%  11/23 [place], echo lve, lvef 35-40%,,rve with mildly reduced rvef,carlitos, mean av grad 21 with mild regurg,mild mr    1/24 cardiac cath    Severe native multivessel coronary artery disease  2.  Occluded vein graft to right coronary artery with native RCA filling of some bridging collaterals with suboptimal flow  3.  Patent vein graft to large circumflex system as well as patent LIMA to LAD  4.  Mean transaortic gradient of about 29 to 30 mmHg at rest.  Calculated aortic valve area of 0.9 cm.  But in the setting of reduced cardiac output and reduce peak systolic flow rate of about 200 mL/s.  After initiation of dobutamine infusion at a rate of 10 mcg/KG per minute, mean gradient increased to 43 mmHg with increase in peak systolic flow rate to over 300 mL/s in the setting of increased cardiac output to about 6 L/min.  However aortic valve area increased to about 1.07 cm²    6/24 TAVR  6/24 echo lvef 20-25%, lvh, stable avr, mean gradient 14mm, carlitos, decreased rvef    7/24 echo unchanged    Chief Complaint   Patient presents with    Valvular Heart Disease       HPI :  He got through his TAVR and BiV

## 2024-08-08 ENCOUNTER — APPOINTMENT (OUTPATIENT)
Facility: HOSPITAL | Age: 79
End: 2024-08-08
Attending: THORACIC SURGERY (CARDIOTHORACIC VASCULAR SURGERY)
Payer: MEDICARE

## 2024-08-12 ENCOUNTER — HOSPITAL ENCOUNTER (OUTPATIENT)
Facility: HOSPITAL | Age: 79
Setting detail: RECURRING SERIES
Discharge: HOME | End: 2024-08-15
Attending: THORACIC SURGERY (CARDIOTHORACIC VASCULAR SURGERY)
Payer: MEDICARE

## 2024-08-12 VITALS — BODY MASS INDEX: 26.01 KG/M2 | WEIGHT: 186.4 LBS

## 2024-08-12 PROCEDURE — 93798 PHYS/QHP OP CAR RHAB W/ECG: CPT

## 2024-08-12 ASSESSMENT — EJECTION FRACTION: EF_VALUE: 20

## 2024-08-12 ASSESSMENT — EXERCISE STRESS TEST
PEAK_METS: 3.5
PEAK_RPE: 12

## 2024-08-12 ASSESSMENT — LIFESTYLE VARIABLES: SMOKELESS_TOBACCO: NO

## 2024-08-14 ENCOUNTER — APPOINTMENT (OUTPATIENT)
Facility: HOSPITAL | Age: 79
End: 2024-08-14
Attending: THORACIC SURGERY (CARDIOTHORACIC VASCULAR SURGERY)
Payer: MEDICARE

## 2024-08-19 ENCOUNTER — HOSPITAL ENCOUNTER (OUTPATIENT)
Facility: HOSPITAL | Age: 79
Setting detail: RECURRING SERIES
Discharge: HOME | End: 2024-08-22
Attending: THORACIC SURGERY (CARDIOTHORACIC VASCULAR SURGERY)
Payer: MEDICARE

## 2024-08-19 VITALS — BODY MASS INDEX: 25.87 KG/M2 | WEIGHT: 185.4 LBS

## 2024-08-19 PROCEDURE — 93798 PHYS/QHP OP CAR RHAB W/ECG: CPT

## 2024-08-19 ASSESSMENT — EXERCISE STRESS TEST
PEAK_RPE: 12
PEAK_METS: 3.5

## 2024-08-26 ENCOUNTER — HOSPITAL ENCOUNTER (OUTPATIENT)
Facility: HOSPITAL | Age: 79
Setting detail: RECURRING SERIES
Discharge: HOME | End: 2024-08-29
Attending: THORACIC SURGERY (CARDIOTHORACIC VASCULAR SURGERY)
Payer: MEDICARE

## 2024-08-26 VITALS — BODY MASS INDEX: 25.98 KG/M2 | WEIGHT: 186.2 LBS

## 2024-08-26 PROCEDURE — 93798 PHYS/QHP OP CAR RHAB W/ECG: CPT

## 2024-08-26 ASSESSMENT — EXERCISE STRESS TEST
PEAK_METS: 3.5
PEAK_RPE: 12

## 2024-08-29 ENCOUNTER — APPOINTMENT (OUTPATIENT)
Facility: HOSPITAL | Age: 79
End: 2024-08-29
Attending: THORACIC SURGERY (CARDIOTHORACIC VASCULAR SURGERY)
Payer: MEDICARE

## 2024-09-04 ENCOUNTER — HOSPITAL ENCOUNTER (OUTPATIENT)
Facility: HOSPITAL | Age: 79
Setting detail: RECURRING SERIES
Discharge: HOME | End: 2024-09-07
Attending: THORACIC SURGERY (CARDIOTHORACIC VASCULAR SURGERY)
Payer: MEDICARE

## 2024-09-04 ENCOUNTER — TELEPHONE (OUTPATIENT)
Age: 79
End: 2024-09-04

## 2024-09-04 VITALS — WEIGHT: 186.4 LBS | BODY MASS INDEX: 26.01 KG/M2

## 2024-09-04 PROCEDURE — 93798 PHYS/QHP OP CAR RHAB W/ECG: CPT

## 2024-09-04 ASSESSMENT — LIFESTYLE VARIABLES: SMOKELESS_TOBACCO: NO

## 2024-09-04 ASSESSMENT — EXERCISE STRESS TEST
PEAK_METS: 3.5
PEAK_RPE: 12

## 2024-09-04 ASSESSMENT — EJECTION FRACTION: EF_VALUE: 20

## 2024-09-04 NOTE — TELEPHONE ENCOUNTER
The patient's spouse called to make sure there are no restrictions for flying due to the TAVR valve. The patient's TAVR was place in 06/2024. From the perspective of TAVR valve placement, there are no contraindications to flying. The patient has not reported any dizziness or shortness of breath.

## 2024-09-09 ENCOUNTER — HOSPITAL ENCOUNTER (OUTPATIENT)
Facility: HOSPITAL | Age: 79
Setting detail: RECURRING SERIES
Discharge: HOME | End: 2024-09-12
Attending: THORACIC SURGERY (CARDIOTHORACIC VASCULAR SURGERY)
Payer: MEDICARE

## 2024-09-09 VITALS — WEIGHT: 187.4 LBS | BODY MASS INDEX: 26.15 KG/M2

## 2024-09-09 PROCEDURE — 93798 PHYS/QHP OP CAR RHAB W/ECG: CPT

## 2024-09-09 ASSESSMENT — EXERCISE STRESS TEST
PEAK_METS: 3.5
PEAK_RPE: 12

## 2024-09-11 ENCOUNTER — APPOINTMENT (OUTPATIENT)
Facility: HOSPITAL | Age: 79
End: 2024-09-11
Attending: THORACIC SURGERY (CARDIOTHORACIC VASCULAR SURGERY)
Payer: MEDICARE

## 2024-09-25 ENCOUNTER — HOSPITAL ENCOUNTER (OUTPATIENT)
Facility: HOSPITAL | Age: 79
Setting detail: RECURRING SERIES
Discharge: HOME | End: 2024-09-28
Attending: THORACIC SURGERY (CARDIOTHORACIC VASCULAR SURGERY)
Payer: MEDICARE

## 2024-09-25 VITALS — BODY MASS INDEX: 26.01 KG/M2 | WEIGHT: 186.4 LBS

## 2024-09-25 PROCEDURE — 93798 PHYS/QHP OP CAR RHAB W/ECG: CPT

## 2024-09-25 ASSESSMENT — EXERCISE STRESS TEST
PEAK_METS: 3.5
PEAK_RPE: 12

## 2024-10-08 ENCOUNTER — APPOINTMENT (OUTPATIENT)
Facility: HOSPITAL | Age: 79
End: 2024-10-08
Attending: THORACIC SURGERY (CARDIOTHORACIC VASCULAR SURGERY)
Payer: MEDICARE

## 2024-10-15 ENCOUNTER — HOSPITAL ENCOUNTER (OUTPATIENT)
Facility: HOSPITAL | Age: 79
Setting detail: RECURRING SERIES
Discharge: HOME | End: 2024-10-18
Attending: THORACIC SURGERY (CARDIOTHORACIC VASCULAR SURGERY)
Payer: MEDICARE

## 2024-10-15 ENCOUNTER — OFFICE VISIT (OUTPATIENT)
Age: 79
End: 2024-10-15
Payer: MEDICARE

## 2024-10-15 ENCOUNTER — ANCILLARY PROCEDURE (OUTPATIENT)
Age: 79
End: 2024-10-15
Payer: MEDICARE

## 2024-10-15 VITALS
SYSTOLIC BLOOD PRESSURE: 92 MMHG | HEIGHT: 70 IN | DIASTOLIC BLOOD PRESSURE: 60 MMHG | BODY MASS INDEX: 26.34 KG/M2 | WEIGHT: 184 LBS

## 2024-10-15 VITALS
HEART RATE: 74 BPM | WEIGHT: 184 LBS | SYSTOLIC BLOOD PRESSURE: 108 MMHG | DIASTOLIC BLOOD PRESSURE: 68 MMHG | BODY MASS INDEX: 25.76 KG/M2 | OXYGEN SATURATION: 95 % | HEIGHT: 71 IN

## 2024-10-15 VITALS — BODY MASS INDEX: 25.98 KG/M2 | WEIGHT: 186.2 LBS

## 2024-10-15 DIAGNOSIS — Z95.2 S/P TAVR (TRANSCATHETER AORTIC VALVE REPLACEMENT): ICD-10-CM

## 2024-10-15 DIAGNOSIS — Z95.810 BIVENTRICULAR ICD (IMPLANTABLE CARDIOVERTER-DEFIBRILLATOR) IN PLACE: ICD-10-CM

## 2024-10-15 DIAGNOSIS — E78.2 MIXED HYPERLIPIDEMIA: ICD-10-CM

## 2024-10-15 DIAGNOSIS — I25.5 ISCHEMIC CARDIOMYOPATHY: ICD-10-CM

## 2024-10-15 DIAGNOSIS — I25.5 ISCHEMIC CARDIOMYOPATHY: Primary | ICD-10-CM

## 2024-10-15 PROCEDURE — 93798 PHYS/QHP OP CAR RHAB W/ECG: CPT

## 2024-10-15 PROCEDURE — G8419 CALC BMI OUT NRM PARAM NOF/U: HCPCS | Performed by: SPECIALIST

## 2024-10-15 PROCEDURE — G8427 DOCREV CUR MEDS BY ELIG CLIN: HCPCS | Performed by: SPECIALIST

## 2024-10-15 PROCEDURE — 1036F TOBACCO NON-USER: CPT | Performed by: SPECIALIST

## 2024-10-15 PROCEDURE — 1123F ACP DISCUSS/DSCN MKR DOCD: CPT | Performed by: SPECIALIST

## 2024-10-15 PROCEDURE — 99214 OFFICE O/P EST MOD 30 MIN: CPT | Performed by: SPECIALIST

## 2024-10-15 PROCEDURE — G8484 FLU IMMUNIZE NO ADMIN: HCPCS | Performed by: SPECIALIST

## 2024-10-15 PROCEDURE — 93308 TTE F-UP OR LMTD: CPT | Performed by: SPECIALIST

## 2024-10-15 ASSESSMENT — PATIENT HEALTH QUESTIONNAIRE - PHQ9
SUM OF ALL RESPONSES TO PHQ9 QUESTIONS 1 & 2: 0
SUM OF ALL RESPONSES TO PHQ QUESTIONS 1-9: 0
1. LITTLE INTEREST OR PLEASURE IN DOING THINGS: NOT AT ALL
2. FEELING DOWN, DEPRESSED OR HOPELESS: NOT AT ALL

## 2024-10-15 ASSESSMENT — EXERCISE STRESS TEST
PEAK_METS: 3.8
PEAK_RPE: 12

## 2024-10-15 NOTE — PROGRESS NOTES
HISTORY OF PRESENT ILLNESS  Aakash Faust is a 79 y.o. male     SUMMARY:   Patient Active Problem List   Diagnosis    Coronary atherosclerosis    Mixed hyperlipidemia    Aortic stenosis    Cardiomyopathy (HCC)    Chronic systolic heart failure (HCC)    Biventricular ICD (implantable cardioverter-defibrillator) in place    Non-rheumatic aortic stenosis    S/P TAVR (transcatheter aortic valve replacement)            CARDIOLOGY STUDIES TO DATE:  2/9/11. Adenosine cardiolyte. Cardiac stress testing was pos for chest pain and myocardial ischemia. Myocardial perfusion imaging showed reversible deficits.of the ant lat wall Gated EF (%): 49     2/19 negative lexiscan cardiolyte  2/19 echo with lvef 50%, lae, mild mr and tr without pul htn    11/23 [place], cardiolyte stress test with fixed inferior defect, lvef 36%  11/23 [place], echo lve, lvef 35-40%,,rve with mildly reduced rvef,carlitos, mean av grad 21 with mild regurg,mild mr    1/24 cardiac cath    Severe native multivessel coronary artery disease  2.  Occluded vein graft to right coronary artery with native RCA filling of some bridging collaterals with suboptimal flow  3.  Patent vein graft to large circumflex system as well as patent LIMA to LAD  4.  Mean transaortic gradient of about 29 to 30 mmHg at rest.  Calculated aortic valve area of 0.9 cm.  But in the setting of reduced cardiac output and reduce peak systolic flow rate of about 200 mL/s.  After initiation of dobutamine infusion at a rate of 10 mcg/KG per minute, mean gradient increased to 43 mmHg with increase in peak systolic flow rate to over 300 mL/s in the setting of increased cardiac output to about 6 L/min.  However aortic valve area increased to about 1.07 cm²    6/24 TAVR  6/24 echo lvef 20-25%, lvh, stable avr, mean gradient 14mm, carlitos, decreased rvef    7/24 echo unchanged    Chief Complaint   Patient presents with    Follow-up    Cardiomyopathy       HPI :  Overall things are about the

## 2024-10-16 LAB
ECHO AO ASC DIAM: 3.6 CM
ECHO AO ASCENDING AORTA INDEX: 1.79 CM/M2
ECHO AO ROOT DIAM: 2.8 CM
ECHO AO ROOT INDEX: 1.39 CM/M2
ECHO AV AREA PEAK VELOCITY: 1.5 CM2
ECHO AV AREA VTI: 1.5 CM2
ECHO AV AREA/BSA PEAK VELOCITY: 0.7 CM2/M2
ECHO AV AREA/BSA VTI: 0.7 CM2/M2
ECHO AV MEAN GRADIENT: 15 MMHG
ECHO AV MEAN VELOCITY: 1.8 M/S
ECHO AV PEAK GRADIENT: 30 MMHG
ECHO AV PEAK VELOCITY: 2.7 M/S
ECHO AV VELOCITY RATIO: 0.44
ECHO AV VTI: 53.3 CM
ECHO BSA: 2.03 M2
ECHO LA DIAMETER INDEX: 2.54 CM/M2
ECHO LA DIAMETER: 5.1 CM
ECHO LA TO AORTIC ROOT RATIO: 1.82
ECHO LA VOL A-L A2C: 127 ML (ref 18–58)
ECHO LA VOL A-L A4C: 111 ML (ref 18–58)
ECHO LA VOL BP: 112 ML (ref 18–58)
ECHO LA VOL MOD A2C: 119 ML (ref 18–58)
ECHO LA VOL MOD A4C: 102 ML (ref 18–58)
ECHO LA VOL/BSA BIPLANE: 56 ML/M2 (ref 16–34)
ECHO LA VOLUME AREA LENGTH: 120 ML
ECHO LA VOLUME INDEX A-L A2C: 63 ML/M2 (ref 16–34)
ECHO LA VOLUME INDEX A-L A4C: 55 ML/M2 (ref 16–34)
ECHO LA VOLUME INDEX AREA LENGTH: 60 ML/M2 (ref 16–34)
ECHO LA VOLUME INDEX MOD A2C: 59 ML/M2 (ref 16–34)
ECHO LA VOLUME INDEX MOD A4C: 51 ML/M2 (ref 16–34)
ECHO LV EDV A2C: 141 ML
ECHO LV EDV A4C: 156 ML
ECHO LV EDV BP: 150 ML (ref 67–155)
ECHO LV EDV INDEX A4C: 78 ML/M2
ECHO LV EDV INDEX BP: 75 ML/M2
ECHO LV EDV NDEX A2C: 70 ML/M2
ECHO LV EJECTION FRACTION A2C: 46 %
ECHO LV EJECTION FRACTION A4C: 44 %
ECHO LV EJECTION FRACTION BIPLANE: 46 % (ref 55–100)
ECHO LV ESV A2C: 77 ML
ECHO LV ESV A4C: 87 ML
ECHO LV ESV BP: 81 ML (ref 22–58)
ECHO LV ESV INDEX A2C: 38 ML/M2
ECHO LV ESV INDEX A4C: 43 ML/M2
ECHO LV ESV INDEX BP: 40 ML/M2
ECHO LV FRACTIONAL SHORTENING: 20 % (ref 28–44)
ECHO LV INTERNAL DIMENSION DIASTOLE INDEX: 2.94 CM/M2
ECHO LV INTERNAL DIMENSION DIASTOLIC: 5.9 CM (ref 4.2–5.9)
ECHO LV INTERNAL DIMENSION SYSTOLIC INDEX: 2.34 CM/M2
ECHO LV INTERNAL DIMENSION SYSTOLIC: 4.7 CM
ECHO LV IVSD: 1.5 CM (ref 0.6–1)
ECHO LV MASS 2D: 340.7 G (ref 88–224)
ECHO LV MASS INDEX 2D: 169.5 G/M2 (ref 49–115)
ECHO LV POSTERIOR WALL DIASTOLIC: 1.1 CM (ref 0.6–1)
ECHO LV RELATIVE WALL THICKNESS RATIO: 0.37
ECHO LVOT AREA: 3.5 CM2
ECHO LVOT AV VTI INDEX: 0.44
ECHO LVOT DIAM: 2.1 CM
ECHO LVOT MEAN GRADIENT: 3 MMHG
ECHO LVOT PEAK GRADIENT: 6 MMHG
ECHO LVOT PEAK VELOCITY: 1.2 M/S
ECHO LVOT STROKE VOLUME INDEX: 40.8 ML/M2
ECHO LVOT SV: 82 ML
ECHO LVOT VTI: 23.7 CM

## 2024-10-21 ENCOUNTER — HOSPITAL ENCOUNTER (OUTPATIENT)
Facility: HOSPITAL | Age: 79
Setting detail: RECURRING SERIES
Discharge: HOME | End: 2024-10-24
Attending: THORACIC SURGERY (CARDIOTHORACIC VASCULAR SURGERY)
Payer: MEDICARE

## 2024-10-21 VITALS — BODY MASS INDEX: 26.72 KG/M2 | WEIGHT: 186.2 LBS

## 2024-10-21 PROCEDURE — 93798 PHYS/QHP OP CAR RHAB W/ECG: CPT

## 2024-10-21 ASSESSMENT — EXERCISE STRESS TEST
PEAK_RPE: 12
PEAK_METS: 3.8

## 2024-10-28 ENCOUNTER — HOSPITAL ENCOUNTER (OUTPATIENT)
Facility: HOSPITAL | Age: 79
Setting detail: RECURRING SERIES
Discharge: HOME | End: 2024-10-31
Attending: THORACIC SURGERY (CARDIOTHORACIC VASCULAR SURGERY)
Payer: MEDICARE

## 2024-10-28 VITALS — WEIGHT: 189.2 LBS | BODY MASS INDEX: 27.15 KG/M2

## 2024-10-28 PROCEDURE — 93797 PHYS/QHP OP CAR RHAB WO ECG: CPT

## 2024-10-28 PROCEDURE — 93798 PHYS/QHP OP CAR RHAB W/ECG: CPT

## 2024-10-28 ASSESSMENT — EXERCISE STRESS TEST
PEAK_METS: 3.8
PEAK_RPE: 12

## 2024-10-28 NOTE — PROGRESS NOTES
Cardiac Rehab Nutrition Assessment- 1:1 Evaluation  10/28/2024      NAME: Aakash Faust : 1945 AGE: 79 y.o.  GENDER: male  CARDIAC REHAB ADMITTING DIAGNOSIS: S/P aortic valve allograft [Z95.4]    PROBLEM LIST:  Patient Active Problem List   Diagnosis    Coronary atherosclerosis    Mixed hyperlipidemia    Aortic stenosis    Cardiomyopathy (HCC)    Chronic systolic heart failure (HCC)    Biventricular ICD (implantable cardioverter-defibrillator) in place    Non-rheumatic aortic stenosis    S/P TAVR (transcatheter aortic valve replacement)         LABS:   No results found for: \"HBA1C\", \"[id number]\"  No results found for: \"CHOL\", \"CHOLPOCT\", \"CHLST\", \"CHOLV\", \"HDL\", \"HDLPOC\", \"HDLC\", \"LDL\", \"LDLC\", \"VLDLC\", \"VLDL\"      MEDICATIONS/SUPPLEMENTS:   Scheduled Meds:  Continuous Infusions:  PRN Meds:.  Prior to Admission medications    Medication Sig Start Date End Date Taking? Authorizing Provider   melatonin 3 MG TABS tablet Take 1 tablet by mouth nightly as needed    Violetta Lim MD   DICLOFENAC PO Take 75 mg by mouth as needed (PAIN)    Violetta Lim MD   acetaminophen (TYLENOL) 325 MG tablet Take 2 tablets by mouth every 6 hours as needed for Pain  Patient not taking: Reported on 10/15/2024    Violetta Lim MD   MAGNESIUM GLUCONATE PO Take 240 mg by mouth as needed (FOR LEG CRAMPS AT NIGHT)    Violetta Lim MD   donepezil (ARICEPT) 10 MG tablet Take 1 tablet by mouth nightly    Violetta Lim MD   tamsulosin (FLOMAX) 0.4 MG capsule Take 1 capsule by mouth nightly    Violetta Lim MD   losartan (COZAAR) 25 MG tablet Take 1 tablet by mouth daily  Patient taking differently: Take 1 tablet by mouth daily Per wife gives med if systolic greater than 100 24   Aakash Murcia III, MD   aspirin 81 MG EC tablet Take 1 tablet by mouth daily 3 x a week    Violetta Lim MD           ANTHROPOMETRICS:    Ht Readings from Last 1 Encounters:   10/15/24 1.778

## 2025-01-01 ENCOUNTER — PREP FOR PROCEDURE (OUTPATIENT)
Age: 80
End: 2025-01-01

## 2025-01-01 ENCOUNTER — TELEPHONE (OUTPATIENT)
Age: 80
End: 2025-01-01

## 2025-01-01 DIAGNOSIS — K57.20 PERFORATED DIVERTICULUM OF LARGE INTESTINE: ICD-10-CM

## 2025-01-13 ENCOUNTER — TELEPHONE (OUTPATIENT)
Age: 80
End: 2025-01-13

## 2025-01-13 NOTE — TELEPHONE ENCOUNTER
Received incoming fax requesting referral to [facility] exercise from Angella Wilkerson RN.  Referral signed  by Dr. Murcia and faxed to [facility]-confirmation fax received.

## 2025-02-03 ENCOUNTER — HOSPITAL ENCOUNTER (OUTPATIENT)
Facility: HOSPITAL | Age: 80
Discharge: HOME | End: 2025-02-06
Payer: MEDICARE

## 2025-02-03 ENCOUNTER — TRANSCRIBE ORDERS (OUTPATIENT)
Facility: HOSPITAL | Age: 80
End: 2025-02-03

## 2025-02-03 DIAGNOSIS — M79.671 PAIN IN RIGHT FOOT: ICD-10-CM

## 2025-02-03 DIAGNOSIS — M79.671 PAIN IN RIGHT FOOT: Primary | ICD-10-CM

## 2025-02-03 PROCEDURE — 73630 X-RAY EXAM OF FOOT: CPT

## 2025-02-03 PROCEDURE — 73610 X-RAY EXAM OF ANKLE: CPT

## 2025-02-13 RX ORDER — LOSARTAN POTASSIUM 25 MG/1
25 TABLET ORAL DAILY
Qty: 90 TABLET | Refills: 1 | Status: SHIPPED | OUTPATIENT
Start: 2025-02-13

## 2025-02-13 NOTE — TELEPHONE ENCOUNTER
Requested Prescriptions     Signed Prescriptions Disp Refills    losartan (COZAAR) 25 MG tablet 90 tablet 1     Sig: Take 1 tablet by mouth daily Per wife gives med if systolic greater than 100     Authorizing Provider: AAKASH MURCIA III     Ordering User: VALARIE CAMPUZANO      Future Appointments   Date Time Provider Department Center   4/21/2025 10:20 AM [facility]   5/15/2025 10:30 AM [facility]   5/15/2025 11:30 AM Erika Atkins APRN - NP [facility] AMB   6/26/2025  2:40 PM [id number]  AMB   6/26/2025  3:00 PM Ernst Parmar MD [facility]  AMB      Per Verbal Order by MD/NP

## 2025-04-15 ENCOUNTER — PROCEDURE VISIT (OUTPATIENT)
Age: 80
End: 2025-04-15
Payer: MEDICARE

## 2025-04-15 DIAGNOSIS — Z95.810 BIVENTRICULAR ICD (IMPLANTABLE CARDIOVERTER-DEFIBRILLATOR) IN PLACE: Primary | ICD-10-CM

## 2025-04-15 PROCEDURE — 93295 DEV INTERROG REMOTE 1/2/MLT: CPT | Performed by: INTERNAL MEDICINE

## 2025-04-15 PROCEDURE — 93289 INTERROG DEVICE EVAL HEART: CPT | Performed by: INTERNAL MEDICINE

## 2025-04-22 ENCOUNTER — HOSPITAL ENCOUNTER (OUTPATIENT)
Facility: HOSPITAL | Age: 80
Discharge: HOME | End: 2025-04-25
Payer: MEDICARE

## 2025-04-22 ENCOUNTER — PATIENT MESSAGE (OUTPATIENT)
Age: 80
End: 2025-04-22

## 2025-04-22 ENCOUNTER — TRANSCRIBE ORDERS (OUTPATIENT)
Facility: HOSPITAL | Age: 80
End: 2025-04-22

## 2025-04-22 DIAGNOSIS — I50.22 CHRONIC SYSTOLIC HEART FAILURE (HCC): ICD-10-CM

## 2025-04-22 DIAGNOSIS — Z95.810 BIVENTRICULAR ICD (IMPLANTABLE CARDIOVERTER-DEFIBRILLATOR) IN PLACE: ICD-10-CM

## 2025-04-22 DIAGNOSIS — I25.5 ISCHEMIC CARDIOMYOPATHY: ICD-10-CM

## 2025-04-22 DIAGNOSIS — Z95.2 S/P TAVR (TRANSCATHETER AORTIC VALVE REPLACEMENT): ICD-10-CM

## 2025-04-22 DIAGNOSIS — R06.02 SHORTNESS OF BREATH: ICD-10-CM

## 2025-04-22 DIAGNOSIS — I35.0 AORTIC VALVE STENOSIS, ETIOLOGY OF CARDIAC VALVE DISEASE UNSPECIFIED: Primary | ICD-10-CM

## 2025-04-22 DIAGNOSIS — R06.02 SHORTNESS OF BREATH: Primary | ICD-10-CM

## 2025-04-22 PROCEDURE — 71046 X-RAY EXAM CHEST 2 VIEWS: CPT

## 2025-04-24 ENCOUNTER — TELEPHONE (OUTPATIENT)
Age: 80
End: 2025-04-24

## 2025-04-24 ENCOUNTER — CLINICAL DOCUMENTATION (OUTPATIENT)
Age: 80
End: 2025-04-24

## 2025-04-24 RX ORDER — BUMETANIDE 0.5 MG/1
0.5 TABLET ORAL PRN
COMMUNITY
Start: 2025-04-21

## 2025-04-24 NOTE — PROGRESS NOTES
I spoke to his wife  Sudden onset of dyspnea this past weekend  Xray showed no lead movement and minimal fluid  If echo can be moved up to reevaluate the aortic valve and LVEF then we should  Device clinic adjusted his LV lead output due to left phrenic stim and is better but now concerned that it is not capture so he comes in clinic tomorrow to meet [employer] rep  His wife said dyspnea was not related to that time

## 2025-04-24 NOTE — TELEPHONE ENCOUNTER
Called pt. ID verified using two patient identifiers with pts wife. Wife states pt is tired and currently napping. Recommendations for repeat device interrogation discussed with pts wife. [employer] rep contacted-awaiting confirmation.     Opportunities for questions, clarifications, and concerns provided.    Per LUIS Fitch:    He just had a CXR done earlier this week, and lead position doesn't appear to be significantly changed compared to CXR done in 06/2024.  Please bring him in for in clinic optimization with a rep.  He'll also need an ECG afterward to confirm appropriate biventricular pacing.     In the interim, if he feels he's started to retain fluid or has weight gain, he can start furosemide 20 mg po daily prn.

## 2025-04-24 NOTE — TELEPHONE ENCOUNTER
He just had a CXR done earlier this week, and lead position doesn't appear to be significantly changed compared to CXR done in 06/2024.  Please bring him in for in clinic optimization with a rep.  He'll also need an ECG afterward to confirm appropriate biventricular pacing.    In the interim, if he feels he's started to retain fluid or has weight gain, he can start furosemide 20 mg po daily prn.    Future Appointments   Date Time Provider Department Center   5/15/2025 10:30 AM [facility] ECHO LAB 1 [facility]   5/15/2025 11:30 AM Erika Atkins APRN - NP [facility] BS AMB   5/19/2025 10:20 AM [facility]   6/26/2025  2:40 PM [email or web address] BS AMB   6/26/2025  3:00 PM Ernst Parmar MD [facility] BS AMB

## 2025-04-24 NOTE — TELEPHONE ENCOUNTER
Appointment scheduled for optimization with rep from [employer].    Future Appointments   Date Time Provider Department Center   4/25/2025  2:00 PM [email or web address] BS AMB   5/15/2025 10:30 AM [facility] ECHO LAB 1 [facility]   5/15/2025 11:30 AM Erika Atkins APRN - NP [facility] BS AMB   5/19/2025 10:20 AM Aakash Murcia III, MD [facility] BS AMB   6/26/2025  2:40 PM PACEMAKER3, [facility] BS AMB   6/26/2025  3:00 PM Ernst Parmar MD [facility] BS AMB

## 2025-04-25 ENCOUNTER — TELEPHONE (OUTPATIENT)
Age: 80
End: 2025-04-25

## 2025-04-25 ENCOUNTER — PROCEDURE VISIT (OUTPATIENT)
Age: 80
End: 2025-04-25
Payer: MEDICARE

## 2025-04-25 ENCOUNTER — ANCILLARY PROCEDURE (OUTPATIENT)
Age: 80
End: 2025-04-25
Payer: MEDICARE

## 2025-04-25 VITALS
DIASTOLIC BLOOD PRESSURE: 60 MMHG | BODY MASS INDEX: 25.2 KG/M2 | SYSTOLIC BLOOD PRESSURE: 92 MMHG | HEIGHT: 71 IN | WEIGHT: 180 LBS

## 2025-04-25 DIAGNOSIS — Z95.2 S/P TAVR (TRANSCATHETER AORTIC VALVE REPLACEMENT): ICD-10-CM

## 2025-04-25 DIAGNOSIS — Z95.810 BIVENTRICULAR ICD (IMPLANTABLE CARDIOVERTER-DEFIBRILLATOR) IN PLACE: ICD-10-CM

## 2025-04-25 DIAGNOSIS — I35.0 AORTIC VALVE STENOSIS, ETIOLOGY OF CARDIAC VALVE DISEASE UNSPECIFIED: ICD-10-CM

## 2025-04-25 DIAGNOSIS — I25.5 ISCHEMIC CARDIOMYOPATHY: ICD-10-CM

## 2025-04-25 DIAGNOSIS — I50.22 CHRONIC SYSTOLIC HEART FAILURE (HCC): ICD-10-CM

## 2025-04-25 DIAGNOSIS — Z95.810 BIVENTRICULAR ICD (IMPLANTABLE CARDIOVERTER-DEFIBRILLATOR) IN PLACE: Primary | ICD-10-CM

## 2025-04-25 PROCEDURE — 93306 TTE W/DOPPLER COMPLETE: CPT | Performed by: INTERNAL MEDICINE

## 2025-04-25 NOTE — TELEPHONE ENCOUNTER
Spoke with pt wife to follow up and see if there were any questions regarding my message sent per Dr. Murcia.    She states she did understand the message and he has an appt today with Dr. Parmar as well as an Echo.    She thanked me  with no further questions.

## 2025-04-26 ENCOUNTER — RESULTS FOLLOW-UP (OUTPATIENT)
Age: 80
End: 2025-04-26

## 2025-04-26 LAB
ECHO AO ASC DIAM: 3.4 CM
ECHO AO ASCENDING AORTA INDEX: 1.68 CM/M2
ECHO AO ROOT DIAM: 3.8 CM
ECHO AO ROOT INDEX: 1.88 CM/M2
ECHO AV AREA PEAK VELOCITY: 1 CM2
ECHO AV AREA VTI: 1.2 CM2
ECHO AV AREA/BSA PEAK VELOCITY: 0.5 CM2/M2
ECHO AV AREA/BSA VTI: 0.6 CM2/M2
ECHO AV MEAN GRADIENT: 6 MMHG
ECHO AV MEAN VELOCITY: 1.2 M/S
ECHO AV PEAK GRADIENT: 12 MMHG
ECHO AV PEAK VELOCITY: 1.7 M/S
ECHO AV VELOCITY RATIO: 0.35
ECHO AV VTI: 26.3 CM
ECHO BSA: 2.02 M2
ECHO EST RA PRESSURE: 15 MMHG
ECHO LA DIAMETER INDEX: 2.52 CM/M2
ECHO LA DIAMETER: 5.1 CM
ECHO LA TO AORTIC ROOT RATIO: 1.34
ECHO LA VOL A-L A2C: 146 ML (ref 18–58)
ECHO LA VOL A-L A4C: 153 ML (ref 18–58)
ECHO LA VOL BP: 145 ML (ref 18–58)
ECHO LA VOL MOD A2C: 136 ML (ref 18–58)
ECHO LA VOL MOD A4C: 144 ML (ref 18–58)
ECHO LA VOL/BSA BIPLANE: 72 ML/M2 (ref 16–34)
ECHO LA VOLUME AREA LENGTH: 154 ML
ECHO LA VOLUME INDEX A-L A2C: 72 ML/M2 (ref 16–34)
ECHO LA VOLUME INDEX A-L A4C: 76 ML/M2 (ref 16–34)
ECHO LA VOLUME INDEX AREA LENGTH: 76 ML/M2 (ref 16–34)
ECHO LA VOLUME INDEX MOD A2C: 67 ML/M2 (ref 16–34)
ECHO LA VOLUME INDEX MOD A4C: 71 ML/M2 (ref 16–34)
ECHO LV E' LATERAL VELOCITY: 8.28 CM/S
ECHO LV E' SEPTAL VELOCITY: 3.35 CM/S
ECHO LV EDV A2C: 180 ML
ECHO LV EDV A4C: 143 ML
ECHO LV EDV BP: 165 ML (ref 67–155)
ECHO LV EDV INDEX A4C: 71 ML/M2
ECHO LV EDV INDEX BP: 82 ML/M2
ECHO LV EDV NDEX A2C: 89 ML/M2
ECHO LV EF PHYSICIAN: 20 %
ECHO LV EJECTION FRACTION A2C: 31 %
ECHO LV EJECTION FRACTION A4C: 25 %
ECHO LV EJECTION FRACTION BIPLANE: 28 % (ref 55–100)
ECHO LV ESV A2C: 124 ML
ECHO LV ESV A4C: 107 ML
ECHO LV ESV BP: 119 ML (ref 22–58)
ECHO LV ESV INDEX A2C: 61 ML/M2
ECHO LV ESV INDEX A4C: 53 ML/M2
ECHO LV ESV INDEX BP: 59 ML/M2
ECHO LV FRACTIONAL SHORTENING: 12 % (ref 28–44)
ECHO LV INTERNAL DIMENSION DIASTOLE INDEX: 2.92 CM/M2
ECHO LV INTERNAL DIMENSION DIASTOLIC: 5.9 CM (ref 4.2–5.9)
ECHO LV INTERNAL DIMENSION SYSTOLIC INDEX: 2.57 CM/M2
ECHO LV INTERNAL DIMENSION SYSTOLIC: 5.2 CM
ECHO LV IVSD: 1 CM (ref 0.6–1)
ECHO LV IVSS: 1.1 CM
ECHO LV MASS 2D: 255.7 G (ref 88–224)
ECHO LV MASS INDEX 2D: 126.6 G/M2 (ref 49–115)
ECHO LV POSTERIOR WALL DIASTOLIC: 1.1 CM (ref 0.6–1)
ECHO LV POSTERIOR WALL SYSTOLIC: 1 CM
ECHO LV RELATIVE WALL THICKNESS RATIO: 0.37
ECHO LVOT AREA: 3.1 CM2
ECHO LVOT AV VTI INDEX: 0.38
ECHO LVOT DIAM: 2 CM
ECHO LVOT MEAN GRADIENT: 1 MMHG
ECHO LVOT PEAK GRADIENT: 1 MMHG
ECHO LVOT PEAK VELOCITY: 0.6 M/S
ECHO LVOT STROKE VOLUME INDEX: 15.5 ML/M2
ECHO LVOT SV: 31.4 ML
ECHO LVOT VTI: 10 CM
ECHO MV A VELOCITY: 0.25 M/S
ECHO MV AREA PHT: 7.3 CM2
ECHO MV AREA VTI: 1.9 CM2
ECHO MV E DECELERATION TIME (DT): 104.3 MS
ECHO MV E VELOCITY: 0.91 M/S
ECHO MV E/A RATIO: 3.64
ECHO MV E/E' LATERAL: 10.99
ECHO MV E/E' RATIO (AVERAGED): 19.08
ECHO MV E/E' SEPTAL: 27.16
ECHO MV LVOT VTI INDEX: 1.65
ECHO MV MAX VELOCITY: 0.9 M/S
ECHO MV MEAN GRADIENT: 1 MMHG
ECHO MV MEAN VELOCITY: 0.5 M/S
ECHO MV PEAK GRADIENT: 3 MMHG
ECHO MV PRESSURE HALF TIME (PHT): 30.3 MS
ECHO MV VTI: 16.5 CM
ECHO PV MAX VELOCITY: 0.7 M/S
ECHO PV PEAK GRADIENT: 2 MMHG
ECHO RIGHT VENTRICULAR SYSTOLIC PRESSURE (RVSP): 52 MMHG
ECHO RV FREE WALL PEAK S': 3.7 CM/S
ECHO RV INTERNAL DIMENSION: 5.3 CM
ECHO RV TAPSE: 1.2 CM (ref 1.7–?)
ECHO RVOT PEAK GRADIENT: 0 MMHG
ECHO RVOT PEAK VELOCITY: 0.3 M/S
ECHO TV REGURGITANT MAX VELOCITY: 3.06 M/S
ECHO TV REGURGITANT PEAK GRADIENT: 37 MMHG

## 2025-04-26 PROCEDURE — 93306 TTE W/DOPPLER COMPLETE: CPT | Performed by: INTERNAL MEDICINE

## 2025-04-28 ENCOUNTER — PATIENT MESSAGE (OUTPATIENT)
Age: 80
End: 2025-04-28

## 2025-04-28 NOTE — TELEPHONE ENCOUNTER
Ernst Parmar MD to Jenny Amezcua RN Rodriguez, Robin, TREMAYNE  Me       4/26/25 10:44 PM  Result Note  LVEF decreased a bit more, LVEF 20% Aortic valve normal function He has appt coming up for CHF management   Future Appointments 5/15/2025  11:30 AM   Erika Atkins APRN - * [facility] AMB 5/19/2025  10:20 AM   Aakash Murcia III, MD [facility] AMB 6/26/2025  2:40 PM    [email or web address], [facility] SEE               AMB 6/26/2025  3:00 PM    Ernst Parmar MD [facility]               AMB  ECHO COMPLETE ADULT (TTE) W OR WO CONTR- Clinic Performed

## 2025-05-05 ENCOUNTER — APPOINTMENT (OUTPATIENT)
Facility: HOSPITAL | Age: 80
DRG: 981 | End: 2025-05-05
Payer: MEDICARE

## 2025-05-05 ENCOUNTER — HOSPITAL ENCOUNTER (INPATIENT)
Facility: HOSPITAL | Age: 80
LOS: 14 days | Discharge: HOME HEALTH | DRG: 981 | End: 2025-05-19
Attending: EMERGENCY MEDICINE | Admitting: FAMILY MEDICINE
Payer: MEDICARE

## 2025-05-05 DIAGNOSIS — R06.02 SHORTNESS OF BREATH: ICD-10-CM

## 2025-05-05 DIAGNOSIS — N17.9 AKI (ACUTE KIDNEY INJURY): ICD-10-CM

## 2025-05-05 DIAGNOSIS — I50.23 ACUTE ON CHRONIC SYSTOLIC CHF (CONGESTIVE HEART FAILURE) (HCC): Primary | ICD-10-CM

## 2025-05-05 DIAGNOSIS — B99.9 INTRA-ABDOMINAL INFECTION: ICD-10-CM

## 2025-05-05 DIAGNOSIS — K57.20 PERFORATED DIVERTICULUM OF LARGE INTESTINE: ICD-10-CM

## 2025-05-05 PROBLEM — I50.9 ACUTE EXACERBATION OF CHRONIC HEART FAILURE (HCC): Status: ACTIVE | Noted: 2025-05-05

## 2025-05-05 LAB
ALBUMIN SERPL-MCNC: 3.5 G/DL (ref 3.5–5)
ALBUMIN/GLOB SERPL: 1.1 (ref 1.1–2.2)
ALP SERPL-CCNC: 96 U/L (ref 45–117)
ALT SERPL-CCNC: 23 U/L (ref 12–78)
ANION GAP SERPL CALC-SCNC: 8 MMOL/L (ref 2–12)
AST SERPL-CCNC: 13 U/L (ref 15–37)
BASOPHILS # BLD: 0.03 K/UL (ref 0–0.1)
BASOPHILS NFR BLD: 0.4 % (ref 0–1)
BILIRUB SERPL-MCNC: 1.4 MG/DL (ref 0.2–1)
BUN SERPL-MCNC: 19 MG/DL (ref 6–20)
BUN/CREAT SERPL: 14 (ref 12–20)
CALCIUM SERPL-MCNC: 9.1 MG/DL (ref 8.5–10.1)
CHLORIDE SERPL-SCNC: 104 MMOL/L (ref 97–108)
CO2 SERPL-SCNC: 25 MMOL/L (ref 21–32)
COMMENT:: NORMAL
CREAT SERPL-MCNC: 1.36 MG/DL (ref 0.7–1.3)
DIFFERENTIAL METHOD BLD: ABNORMAL
EKG ATRIAL RATE: 95 BPM
EKG DIAGNOSIS: NORMAL
EKG P AXIS: 91 DEGREES
EKG P-R INTERVAL: 162 MS
EKG Q-T INTERVAL: 406 MS
EKG QRS DURATION: 152 MS
EKG QTC CALCULATION (BAZETT): 510 MS
EKG R AXIS: 257 DEGREES
EKG T AXIS: 51 DEGREES
EKG VENTRICULAR RATE: 95 BPM
EOSINOPHIL # BLD: 0.04 K/UL (ref 0–0.4)
EOSINOPHIL NFR BLD: 0.5 % (ref 0–7)
ERYTHROCYTE [DISTWIDTH] IN BLOOD BY AUTOMATED COUNT: 13.9 % (ref 11.5–14.5)
GLOBULIN SER CALC-MCNC: 3.3 G/DL (ref 2–4)
GLUCOSE SERPL-MCNC: 104 MG/DL (ref 65–100)
HCT VFR BLD AUTO: 39.6 % (ref 36.6–50.3)
HGB BLD-MCNC: 12.6 G/DL (ref 12.1–17)
IMM GRANULOCYTES # BLD AUTO: 0.03 K/UL (ref 0–0.04)
IMM GRANULOCYTES NFR BLD AUTO: 0.4 % (ref 0–0.5)
LYMPHOCYTES # BLD: 1.12 K/UL (ref 0.8–3.5)
LYMPHOCYTES NFR BLD: 13.5 % (ref 12–49)
MAGNESIUM SERPL-MCNC: 1.9 MG/DL (ref 1.6–2.4)
MCH RBC QN AUTO: 28.5 PG (ref 26–34)
MCHC RBC AUTO-ENTMCNC: 31.8 G/DL (ref 30–36.5)
MCV RBC AUTO: 89.6 FL (ref 80–99)
MONOCYTES # BLD: 0.85 K/UL (ref 0–1)
MONOCYTES NFR BLD: 10.2 % (ref 5–13)
NEUTS SEG # BLD: 6.24 K/UL (ref 1.8–8)
NEUTS SEG NFR BLD: 75 % (ref 32–75)
NRBC # BLD: 0 K/UL (ref 0–0.01)
NRBC BLD-RTO: 0 PER 100 WBC
NT PRO BNP: 8566 PG/ML
PLATELET # BLD AUTO: 116 K/UL (ref 150–400)
PMV BLD AUTO: 11 FL (ref 8.9–12.9)
POTASSIUM SERPL-SCNC: 4.3 MMOL/L (ref 3.5–5.1)
PROT SERPL-MCNC: 6.8 G/DL (ref 6.4–8.2)
RBC # BLD AUTO: 4.42 M/UL (ref 4.1–5.7)
SODIUM SERPL-SCNC: 137 MMOL/L (ref 136–145)
SPECIMEN HOLD: NORMAL
TROPONIN I SERPL HS-MCNC: 106 NG/L (ref 0–76)
TROPONIN I SERPL HS-MCNC: 142 NG/L (ref 0–76)
WBC # BLD AUTO: 8.3 K/UL (ref 4.1–11.1)

## 2025-05-05 PROCEDURE — 6370000000 HC RX 637 (ALT 250 FOR IP): Performed by: FAMILY MEDICINE

## 2025-05-05 PROCEDURE — 93010 ELECTROCARDIOGRAM REPORT: CPT | Performed by: SPECIALIST

## 2025-05-05 PROCEDURE — 80053 COMPREHEN METABOLIC PANEL: CPT

## 2025-05-05 PROCEDURE — 84484 ASSAY OF TROPONIN QUANT: CPT

## 2025-05-05 PROCEDURE — 36415 COLL VENOUS BLD VENIPUNCTURE: CPT

## 2025-05-05 PROCEDURE — 93005 ELECTROCARDIOGRAM TRACING: CPT | Performed by: FAMILY MEDICINE

## 2025-05-05 PROCEDURE — 99223 1ST HOSP IP/OBS HIGH 75: CPT | Performed by: INTERNAL MEDICINE

## 2025-05-05 PROCEDURE — 71045 X-RAY EXAM CHEST 1 VIEW: CPT

## 2025-05-05 PROCEDURE — 6360000002 HC RX W HCPCS: Performed by: EMERGENCY MEDICINE

## 2025-05-05 PROCEDURE — 85025 COMPLETE CBC W/AUTO DIFF WBC: CPT

## 2025-05-05 PROCEDURE — 6360000002 HC RX W HCPCS: Performed by: FAMILY MEDICINE

## 2025-05-05 PROCEDURE — 6370000000 HC RX 637 (ALT 250 FOR IP): Performed by: NURSE PRACTITIONER

## 2025-05-05 PROCEDURE — 2500000003 HC RX 250 WO HCPCS: Performed by: FAMILY MEDICINE

## 2025-05-05 PROCEDURE — 99222 1ST HOSP IP/OBS MODERATE 55: CPT | Performed by: INTERNAL MEDICINE

## 2025-05-05 PROCEDURE — 1100000000 HC RM PRIVATE

## 2025-05-05 PROCEDURE — 96374 THER/PROPH/DIAG INJ IV PUSH: CPT

## 2025-05-05 PROCEDURE — 2700000000 HC OXYGEN THERAPY PER DAY

## 2025-05-05 PROCEDURE — 94761 N-INVAS EAR/PLS OXIMETRY MLT: CPT

## 2025-05-05 PROCEDURE — APPSS30 APP SPLIT SHARED TIME 16-30 MINUTES: Performed by: NURSE PRACTITIONER

## 2025-05-05 PROCEDURE — 83735 ASSAY OF MAGNESIUM: CPT

## 2025-05-05 PROCEDURE — 83880 ASSAY OF NATRIURETIC PEPTIDE: CPT

## 2025-05-05 PROCEDURE — 99285 EMERGENCY DEPT VISIT HI MDM: CPT

## 2025-05-05 PROCEDURE — 93005 ELECTROCARDIOGRAM TRACING: CPT | Performed by: EMERGENCY MEDICINE

## 2025-05-05 RX ORDER — ASPIRIN 81 MG/1
81 TABLET ORAL
Status: DISCONTINUED | OUTPATIENT
Start: 2025-05-05 | End: 2025-05-19 | Stop reason: HOSPADM

## 2025-05-05 RX ORDER — BUMETANIDE 0.25 MG/ML
1 INJECTION, SOLUTION INTRAMUSCULAR; INTRAVENOUS 2 TIMES DAILY
Status: DISCONTINUED | OUTPATIENT
Start: 2025-05-05 | End: 2025-05-11

## 2025-05-05 RX ORDER — ALBUTEROL SULFATE 0.63 MG/3ML
1 SOLUTION RESPIRATORY (INHALATION) EVERY 6 HOURS PRN
Status: ON HOLD | COMMUNITY
End: 2025-05-19 | Stop reason: HOSPADM

## 2025-05-05 RX ORDER — DONEPEZIL HYDROCHLORIDE 5 MG/1
10 TABLET, FILM COATED ORAL NIGHTLY
Status: DISCONTINUED | OUTPATIENT
Start: 2025-05-05 | End: 2025-05-19 | Stop reason: HOSPADM

## 2025-05-05 RX ORDER — DONEPEZIL HYDROCHLORIDE 5 MG/1
10 TABLET, FILM COATED ORAL NIGHTLY
Status: CANCELLED | OUTPATIENT
Start: 2025-05-05

## 2025-05-05 RX ORDER — SODIUM CHLORIDE 0.9 % (FLUSH) 0.9 %
5-40 SYRINGE (ML) INJECTION PRN
Status: DISCONTINUED | OUTPATIENT
Start: 2025-05-05 | End: 2025-05-19 | Stop reason: HOSPADM

## 2025-05-05 RX ORDER — ASPIRIN 81 MG/1
81 TABLET ORAL
Status: CANCELLED | OUTPATIENT
Start: 2025-05-05

## 2025-05-05 RX ORDER — POLYETHYLENE GLYCOL 3350 17 G/17G
17 POWDER, FOR SOLUTION ORAL DAILY PRN
Status: DISCONTINUED | OUTPATIENT
Start: 2025-05-05 | End: 2025-05-17

## 2025-05-05 RX ORDER — POTASSIUM CHLORIDE 750 MG/1
40 TABLET, EXTENDED RELEASE ORAL PRN
Status: DISCONTINUED | OUTPATIENT
Start: 2025-05-05 | End: 2025-05-19 | Stop reason: HOSPADM

## 2025-05-05 RX ORDER — ACETAMINOPHEN 650 MG/1
650 SUPPOSITORY RECTAL EVERY 6 HOURS PRN
Status: DISCONTINUED | OUTPATIENT
Start: 2025-05-05 | End: 2025-05-14

## 2025-05-05 RX ORDER — TAMSULOSIN HYDROCHLORIDE 0.4 MG/1
0.4 CAPSULE ORAL NIGHTLY
Status: DISCONTINUED | OUTPATIENT
Start: 2025-05-05 | End: 2025-05-19 | Stop reason: HOSPADM

## 2025-05-05 RX ORDER — POTASSIUM CHLORIDE 7.45 MG/ML
10 INJECTION INTRAVENOUS PRN
Status: DISCONTINUED | OUTPATIENT
Start: 2025-05-05 | End: 2025-05-19 | Stop reason: HOSPADM

## 2025-05-05 RX ORDER — SODIUM CHLORIDE 9 MG/ML
INJECTION, SOLUTION INTRAVENOUS PRN
Status: DISCONTINUED | OUTPATIENT
Start: 2025-05-05 | End: 2025-05-19 | Stop reason: HOSPADM

## 2025-05-05 RX ORDER — ACETAMINOPHEN 325 MG/1
650 TABLET ORAL EVERY 6 HOURS PRN
Status: DISCONTINUED | OUTPATIENT
Start: 2025-05-05 | End: 2025-05-14

## 2025-05-05 RX ORDER — ONDANSETRON 2 MG/ML
4 INJECTION INTRAMUSCULAR; INTRAVENOUS EVERY 6 HOURS PRN
Status: DISCONTINUED | OUTPATIENT
Start: 2025-05-05 | End: 2025-05-19 | Stop reason: HOSPADM

## 2025-05-05 RX ORDER — TAMSULOSIN HYDROCHLORIDE 0.4 MG/1
0.4 CAPSULE ORAL NIGHTLY
Status: CANCELLED | OUTPATIENT
Start: 2025-05-05

## 2025-05-05 RX ORDER — MAGNESIUM SULFATE IN WATER 40 MG/ML
2000 INJECTION, SOLUTION INTRAVENOUS PRN
Status: DISCONTINUED | OUTPATIENT
Start: 2025-05-05 | End: 2025-05-19 | Stop reason: HOSPADM

## 2025-05-05 RX ORDER — ENOXAPARIN SODIUM 100 MG/ML
40 INJECTION SUBCUTANEOUS DAILY
Status: DISCONTINUED | OUTPATIENT
Start: 2025-05-05 | End: 2025-05-19 | Stop reason: HOSPADM

## 2025-05-05 RX ORDER — IPRATROPIUM BROMIDE AND ALBUTEROL SULFATE 2.5; .5 MG/3ML; MG/3ML
1 SOLUTION RESPIRATORY (INHALATION)
Status: DISCONTINUED | OUTPATIENT
Start: 2025-05-05 | End: 2025-05-05

## 2025-05-05 RX ORDER — CARVEDILOL 3.12 MG/1
3.12 TABLET ORAL 2 TIMES DAILY WITH MEALS
Status: DISCONTINUED | OUTPATIENT
Start: 2025-05-05 | End: 2025-05-11

## 2025-05-05 RX ORDER — IPRATROPIUM BROMIDE AND ALBUTEROL SULFATE 2.5; .5 MG/3ML; MG/3ML
1 SOLUTION RESPIRATORY (INHALATION) EVERY 4 HOURS PRN
Status: DISCONTINUED | OUTPATIENT
Start: 2025-05-05 | End: 2025-05-19 | Stop reason: HOSPADM

## 2025-05-05 RX ORDER — BUMETANIDE 0.25 MG/ML
1 INJECTION, SOLUTION INTRAMUSCULAR; INTRAVENOUS ONCE
Status: COMPLETED | OUTPATIENT
Start: 2025-05-05 | End: 2025-05-05

## 2025-05-05 RX ORDER — SODIUM CHLORIDE 0.9 % (FLUSH) 0.9 %
5-40 SYRINGE (ML) INJECTION EVERY 12 HOURS SCHEDULED
Status: DISCONTINUED | OUTPATIENT
Start: 2025-05-05 | End: 2025-05-19 | Stop reason: HOSPADM

## 2025-05-05 RX ORDER — ONDANSETRON 4 MG/1
4 TABLET, ORALLY DISINTEGRATING ORAL EVERY 8 HOURS PRN
Status: DISCONTINUED | OUTPATIENT
Start: 2025-05-05 | End: 2025-05-19 | Stop reason: HOSPADM

## 2025-05-05 RX ADMIN — ENOXAPARIN SODIUM 40 MG: 100 INJECTION SUBCUTANEOUS at 12:37

## 2025-05-05 RX ADMIN — BUMETANIDE 1 MG: 0.25 INJECTION INTRAMUSCULAR; INTRAVENOUS at 15:48

## 2025-05-05 RX ADMIN — SODIUM CHLORIDE, PRESERVATIVE FREE 10 ML: 5 INJECTION INTRAVENOUS at 21:56

## 2025-05-05 RX ADMIN — DONEPEZIL HYDROCHLORIDE 10 MG: 5 TABLET, FILM COATED ORAL at 21:57

## 2025-05-05 RX ADMIN — EMPAGLIFLOZIN 10 MG: 10 TABLET, FILM COATED ORAL at 14:24

## 2025-05-05 RX ADMIN — BUMETANIDE 1 MG: 0.25 INJECTION INTRAMUSCULAR; INTRAVENOUS at 05:44

## 2025-05-05 RX ADMIN — SODIUM CHLORIDE, PRESERVATIVE FREE 10 ML: 5 INJECTION INTRAVENOUS at 12:38

## 2025-05-05 RX ADMIN — TAMSULOSIN HYDROCHLORIDE 0.4 MG: 0.4 CAPSULE ORAL at 21:57

## 2025-05-05 RX ADMIN — Medication 4.5 MG: at 22:00

## 2025-05-05 RX ADMIN — ASPIRIN 81 MG: 81 TABLET, COATED ORAL at 21:56

## 2025-05-05 ASSESSMENT — PAIN - FUNCTIONAL ASSESSMENT: PAIN_FUNCTIONAL_ASSESSMENT: 0-10

## 2025-05-05 ASSESSMENT — PAIN SCALES - GENERAL
PAINLEVEL_OUTOF10: 0
PAINLEVEL_OUTOF10: 0

## 2025-05-05 NOTE — ED TRIAGE NOTES
Patient arrives to ED via EMS with c/o SOB for the past few weeks    Patient reports SOB when lying down     Patient was 93-96% on RA - placed on 2L for comfort     Wife states that the patient has had increased LE swelling - reports taking Bumex    Hx of quadruple bypass, pacemaker, dementia

## 2025-05-05 NOTE — ED PROVIDER NOTES
[facility] EMERGENCY DEPARTMENT  EMERGENCY DEPARTMENT ENCOUNTER      Pt Name: Aakash Faust  MRN: [id number]  Birthdate 1945  Date of evaluation: 5/5/2025  Provider: Adam Sutherland MD    CHIEF COMPLAINT       Chief Complaint   Patient presents with    Shortness of Breath       EMERGENCY DEPARTMENT COURSE and DIFFERENTIAL DIAGNOSIS/MDM:   Medical Decision Making    79-year-old male brought into the ER by EMS with report of shortness of breath that has been worsening over the last couple weeks but worsened even more significantly this evening.  Patient having orthopnea lower leg swelling bilaterally.  Patient was recently started on Bumex according the patient's wife.  Patient does have history of congestive heart failure with ejection fraction of 20 to 25%.  With severe global hypokinesis.  Patient also has history of aortic valve stenosis status post prosthetic valve.  Patient is not on anticoagulation.  No report of any fevers or chills.  Patient has been having some exertional dyspnea however had more prominent shortness of breath with yesterday evening while laying flat to sleep making it difficult for the patient to sleep.  Patient satting 93% on room air was placed onto his nasal cannula by nursing staff due to patient's comfort shortness of breath.  No report of chest pain.  Denies any firing of his pacemaker/AICD.  On exam patient in no acute distress however does have rails bilateral lung exam with decreased lung sounds more on the right base.  Some 1+ pitting edema of the lower extremities.  Patient not hypertensive.  Patient afebrile.  Patient's symptoms concerning for congestive heart failure.  Started patient on Bumex.  Will check labs and electrolytes.  I discussed likely need for hospitalization as he is failing outpatient treatment with diuretics.  EKG with no signs of ischemia or dysrhythmias.  No cardiac murmurs.  Patient has mild increased work of breathing but no

## 2025-05-05 NOTE — ED NOTES
Patient noted to have 28 beats of non-sustained VT, provider notified. Repeat EKG obtained, patient denies any chest pain at this time.

## 2025-05-05 NOTE — ED NOTES
Bedside and Verbal shift change report given to Magdalena RN (oncoming nurse) by Marva RN (offgoing nurse). Report included the following information Nurse Handoff Report, ED Encounter Summary, ED SBAR, Adult Overview, MAR, Recent Results, and Cardiac Rhythm SR .

## 2025-05-05 NOTE — CONSULTS
Cardiac Electrophysiology Hospital Initial Visit    NAME: Aakash Faust   : 1945   MRM: [id number]     Date: 2025    Primary Cardiologist: Dr. Murcia/Justus    Assessment and Plan:      Acute on chronic systolic CHF, shortness of breaths for 2 weeks  It is coincidental with LV lead phrenic nerve stimulation   Leads did not move on chest xray  This could be from dilated LV from being fluid overloaded  We noted he had left phrenic nerve stimulation from LV 1 to RV coil pacing so it was adjusted to LV2- RV coil pacing  This resolved left phrenic nerve stimulation  He did not lose much of CRT (99% to 95%)  He is better after hospital admission once he got IV diuretic    I do not think his heart failure decompensation was due to LV1-RV coil pacing that resulted in loss of CRT.  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   - Continue Aspirin     AS: s/p TAVR with Dr. Jerome.            Ernst Parmar M.D.   Electrophysiology/Cardiology   [facility] [place]   [address]                      [address]   [place] [address]                             [facility] [address] [phone]

## 2025-05-05 NOTE — ED NOTES
TRANSFER - OUT REPORT:    Verbal report given to TREMAYNE Younger on Aakash Faust  being transferred to [facility] for routine progression of patient care       Report consisted of patient's Situation, Background, Assessment and   Recommendations(SBAR).     Information from the following report(s) Nurse Handoff Report, ED Encounter Summary, ED SBAR, Adult Overview, and Recent Results was reviewed with the receiving nurse.    [place] Fall Assessment:    Presents to emergency department  because of falls (Syncope, seizure, or loss of consciousness): No  Age > 70: Yes  Altered Mental Status, Intoxication with alcohol or substance confusion (Disorientation, impaired judgment, poor safety awaremess, or inability to follow instructions): No  Impaired Mobility: Ambulates or transfers with assistive devices or assistance; Unable to ambulate or transer.: Yes  Nursing Judgement: Yes          Lines:   Peripheral IV 05/05/25 Left Forearm (Active)   Site Assessment Clean, dry & intact 05/05/25 0530   Line Status Normal saline locked 05/05/25 0530   Line Care Connections checked and tightened 05/05/25 0530   Phlebitis Assessment No symptoms 05/05/25 0530   Infiltration Assessment 0 05/05/25 0530   Alcohol Cap Used No 05/05/25 0530   Dressing Status Clean, dry & intact 05/05/25 0530   Dressing Type Transparent 05/05/25 0530   Dressing Intervention New 05/05/25 0530        Opportunity for questions and clarification was provided.      Patient transported with:  Tech

## 2025-05-05 NOTE — CONSULTS
[facility] CARDIOLOGY                    Cardiology Care Note     [x]Initial Encounter     []Follow-up    Patient Name: Aakash Faust - :1945 - MRN:[id number]  Primary Cardiologist: Aakash Murcia MD, Ernst Parmar MD  Consulting Cardiologist: José Zavala MD     Reason for encounter: CHF    HPI:       Aakash Faust is a 79 y.o. male with PMH significant for HFrEF s/p BiV ICD, s/p TAVR 2024, CAD s/p CABG, PVC's, HLD, prostate CA and dementia.     Pt presented to the ED due to complaints of worsening SOB since [place]. Can happen with activity and at rest, worse yesterday and associated with orthopnea. Wife states shortly after his device was reprogrammed his symptoms started. Has some lower ext edema off and on. No CP. Wife provides most of history.     Subjective:      Aakash Faust reports dyspnea.     Assessment and Plan     Acute on chronic HFrEF, s/p [employer] BiV ICD  - recent echo w/ EF 20-25%, was 35-40% after TAVR  - no need to repeat echo  - pBNP 8566  - cont IV bumex 1 mg bid  - on losartan PTA, consider switching to Entresto  - start BB if BP allows (? Prev not on d/t BP)  - consider SGLT2i   - K 4.3, hold starting aldactone   - likely cause of elevated troponin   - interrogate device and consult EP to see if drop in EF could be r/t device     2. NSVT  - ~28 beat run this morning   - K 4.3, Mg 1.9  - interrogate device  - add BB if BP allows     3. S/p TAVR  - on ASA  - recent echo w/ normal valve function     4. CAD s/p CABG  - s/p C 2024 w/ occluded SVG to RCA, patent SVG to Lcx, patent LIMA to LAD  - cont ASA   - allergy/intolerance to statins     5. Dementia   - cont Aricept        ____________________________________________________________    Cardiac testing  25    ECHO (TTE) COMPLETE (PRN CONTRAST/BUBBLE/STRAIN/3D) 2025 10:27 PM (Final)    Interpretation Summary    Left Ventricle: Severely reduced left ventricular systolic function with a visually estimated  bilaterally.  ADDITIONAL COMMENTS: N/A    Impression  Sigmoid diverticulosis. Prostate enlargement. Bilateral nonobstructing renal  stones. Dependent bladder stone. No acute abnormality is identified.      Lab Results   Component Value Date/Time     05/05/2025 05:31 AM    K 4.3 05/05/2025 05:31 AM     05/05/2025 05:31 AM    CO2 25 05/05/2025 05:31 AM    BUN 19 05/05/2025 05:31 AM    CREATININE 1.36 05/05/2025 05:31 AM    GLUCOSE 104 05/05/2025 05:31 AM    CALCIUM 9.1 05/05/2025 05:31 AM    LABGLOM 53 05/05/2025 05:31 AM    LABGLOM >60 03/05/2024 10:33 AM      No results found for: \"BNP\"  Lab Results   Component Value Date    WBC 8.3 05/05/2025    HGB 12.6 05/05/2025    HCT 39.6 05/05/2025    MCV 89.6 05/05/2025     (L) 05/05/2025     No results for input(s): \"CHOL\", \"HDLC\", \"LDLC\", \"HBA1C\" in the last 72 hours.    Invalid input(s): \"TGL\"    Current meds:  No current facility-administered medications for this encounter.    Current Outpatient Medications:     bumetanide (BUMEX) 0.5 MG tablet, Take 1 tablet by mouth as needed (wt gain greater than 3 pounds,SOB, oxygen saturation > 90%), Disp: , Rfl:     losartan (COZAAR) 25 MG tablet, Take 1 tablet by mouth daily Per wife gives med if systolic greater than 100, Disp: 90 tablet, Rfl: 1    melatonin 3 MG TABS tablet, Take 1 tablet by mouth nightly as needed, Disp: , Rfl:     DICLOFENAC PO, Take 75 mg by mouth as needed (PAIN), Disp: , Rfl:     acetaminophen (TYLENOL) 325 MG tablet, Take 2 tablets by mouth every 6 hours as needed for Pain, Disp: , Rfl:     MAGNESIUM GLUCONATE PO, Take 240 mg by mouth as needed (FOR LEG CRAMPS AT NIGHT), Disp: , Rfl:     donepezil (ARICEPT) 10 MG tablet, Take 1 tablet by mouth nightly, Disp: , Rfl:     tamsulosin (FLOMAX) 0.4 MG capsule, Take 1 capsule by mouth nightly, Disp: , Rfl:     aspirin 81 MG EC tablet, Take 1 tablet by mouth daily 3 x a week, Disp: , Rfl:     CARLOS Bustamante NP

## 2025-05-05 NOTE — H&P
Hospitalist Admission Note    NAME:  Aakash Faust   :  1945   MRN:  [id number]     Date/Time:  2025 3:42 PM    Patient PCP: Js Sanchez MD  ________________________________________________________________________    Given the patient's current clinical presentation, I have a high level of concern for decompensation if discharged from the emergency department.  Complex decision making was performed, which includes reviewing the patient's available past medical records, laboratory results, and x-ray films.       My assessment of this patient's clinical condition and my plan of care is as follows.    Assessment / Plan:    Aakash is a 79 y.o.  male with PMHx dementia, kidney stones, prostate cancer, CHF with reduced EF who presents with shortness of breath found to have likely CHF exacerbation.    # Acute decompensated heart failure with reduced ejection fraction: Acute, present on admission.  Recent echo with EF of 20%.  Also grade 3 diastolic dysfunction.  Has pacemaker.  Chest x-ray shows pulmonary vascular congestion.  Think COPD exacerbation unlikely as no history of COPD though distant history of significant smoking.  - Cardiology consult  - EP consult  - Bumex twice daily for now  - Troponins downtrending and patient has no chest pain.  - Start Jardiance   - Continue Coreg if BP tolerates  - DuoNebs as needed  - Would consider Entresto of her home losartan if BP tolerates  - Continue aspirin  - Daily weights  - Strict I's and O's    ALISHA: Baseline of 1.09 presented with 1.36.  Likely cardiorenal in nature.  Will diurese.  Will trend.    Aortic stenosis: Status post TAVR.  Continue to monitor.    NSVT: Had 28 beat run this morning.  - Continue telemetry  - Will interrogate pacemaker  - Beta-blocker if BP tolerates    BPH: Chronic.  Continue Flomax.    Dementia: Chronic.  Continue Aricept.              I have personally reviewed the radiographs, laboratory data in Epic and [employer] and

## 2025-05-06 ENCOUNTER — APPOINTMENT (OUTPATIENT)
Facility: HOSPITAL | Age: 80
DRG: 981 | End: 2025-05-06
Payer: MEDICARE

## 2025-05-06 LAB
ALBUMIN SERPL-MCNC: 3 G/DL (ref 3.5–5)
ALBUMIN/GLOB SERPL: 1 (ref 1.1–2.2)
ALP SERPL-CCNC: 83 U/L (ref 45–117)
ALT SERPL-CCNC: 17 U/L (ref 12–78)
ANION GAP SERPL CALC-SCNC: 8 MMOL/L (ref 2–12)
APPEARANCE UR: CLEAR
AST SERPL-CCNC: 12 U/L (ref 15–37)
BACTERIA URNS QL MICRO: NEGATIVE /HPF
BASOPHILS # BLD: 0.04 K/UL (ref 0–0.1)
BASOPHILS NFR BLD: 0.6 % (ref 0–1)
BILIRUB SERPL-MCNC: 1.1 MG/DL (ref 0.2–1)
BILIRUB UR QL: NEGATIVE
BUN SERPL-MCNC: 22 MG/DL (ref 6–20)
BUN/CREAT SERPL: 16 (ref 12–20)
CALCIUM SERPL-MCNC: 8.6 MG/DL (ref 8.5–10.1)
CHLORIDE SERPL-SCNC: 102 MMOL/L (ref 97–108)
CO2 SERPL-SCNC: 26 MMOL/L (ref 21–32)
COLOR UR: ABNORMAL
CREAT SERPL-MCNC: 1.35 MG/DL (ref 0.7–1.3)
D DIMER PPP FEU-MCNC: 7.11 MG/L FEU (ref 0–0.65)
DIFFERENTIAL METHOD BLD: ABNORMAL
EOSINOPHIL # BLD: 0.12 K/UL (ref 0–0.4)
EOSINOPHIL NFR BLD: 1.7 % (ref 0–7)
EPITH CASTS URNS QL MICRO: ABNORMAL /LPF
ERYTHROCYTE [DISTWIDTH] IN BLOOD BY AUTOMATED COUNT: 14.1 % (ref 11.5–14.5)
GLOBULIN SER CALC-MCNC: 3 G/DL (ref 2–4)
GLUCOSE SERPL-MCNC: 125 MG/DL (ref 65–100)
GLUCOSE UR STRIP.AUTO-MCNC: >1000 MG/DL
HCT VFR BLD AUTO: 37.7 % (ref 36.6–50.3)
HGB BLD-MCNC: 12.2 G/DL (ref 12.1–17)
HGB UR QL STRIP: ABNORMAL
HYALINE CASTS URNS QL MICRO: ABNORMAL /LPF (ref 0–2)
IMM GRANULOCYTES # BLD AUTO: 0.03 K/UL (ref 0–0.04)
IMM GRANULOCYTES NFR BLD AUTO: 0.4 % (ref 0–0.5)
KETONES UR QL STRIP.AUTO: NEGATIVE MG/DL
LEUKOCYTE ESTERASE UR QL STRIP.AUTO: NEGATIVE
LYMPHOCYTES # BLD: 1.14 K/UL (ref 0.8–3.5)
LYMPHOCYTES NFR BLD: 16.3 % (ref 12–49)
MCH RBC QN AUTO: 28.8 PG (ref 26–34)
MCHC RBC AUTO-ENTMCNC: 32.4 G/DL (ref 30–36.5)
MCV RBC AUTO: 88.9 FL (ref 80–99)
MONOCYTES # BLD: 0.66 K/UL (ref 0–1)
MONOCYTES NFR BLD: 9.4 % (ref 5–13)
NEUTS SEG # BLD: 5.01 K/UL (ref 1.8–8)
NEUTS SEG NFR BLD: 71.6 % (ref 32–75)
NITRITE UR QL STRIP.AUTO: NEGATIVE
NRBC # BLD: 0 K/UL (ref 0–0.01)
NRBC BLD-RTO: 0 PER 100 WBC
PH UR STRIP: 5.5 (ref 5–8)
PLATELET # BLD AUTO: 104 K/UL (ref 150–400)
PMV BLD AUTO: 11 FL (ref 8.9–12.9)
POTASSIUM SERPL-SCNC: 3.4 MMOL/L (ref 3.5–5.1)
PROT SERPL-MCNC: 6 G/DL (ref 6.4–8.2)
PROT UR STRIP-MCNC: NEGATIVE MG/DL
RBC # BLD AUTO: 4.24 M/UL (ref 4.1–5.7)
RBC #/AREA URNS HPF: ABNORMAL /HPF (ref 0–5)
SODIUM SERPL-SCNC: 136 MMOL/L (ref 136–145)
SP GR UR REFRACTOMETRY: 1.01 (ref 1–1.03)
SPECIMEN HOLD: NORMAL
UROBILINOGEN UR QL STRIP.AUTO: 0.2 EU/DL (ref 0.2–1)
WBC # BLD AUTO: 7 K/UL (ref 4.1–11.1)
WBC URNS QL MICRO: ABNORMAL /HPF (ref 0–4)

## 2025-05-06 PROCEDURE — 74178 CT ABD&PLV WO CNTR FLWD CNTR: CPT

## 2025-05-06 PROCEDURE — 2580000003 HC RX 258: Performed by: FAMILY MEDICINE

## 2025-05-06 PROCEDURE — 93005 ELECTROCARDIOGRAM TRACING: CPT | Performed by: EMERGENCY MEDICINE

## 2025-05-06 PROCEDURE — 36415 COLL VENOUS BLD VENIPUNCTURE: CPT

## 2025-05-06 PROCEDURE — 71275 CT ANGIOGRAPHY CHEST: CPT

## 2025-05-06 PROCEDURE — APPSS30 APP SPLIT SHARED TIME 16-30 MINUTES: Performed by: NURSE PRACTITIONER

## 2025-05-06 PROCEDURE — 6360000004 HC RX CONTRAST MEDICATION: Performed by: FAMILY MEDICINE

## 2025-05-06 PROCEDURE — 99232 SBSQ HOSP IP/OBS MODERATE 35: CPT | Performed by: INTERNAL MEDICINE

## 2025-05-06 PROCEDURE — 2700000000 HC OXYGEN THERAPY PER DAY

## 2025-05-06 PROCEDURE — 85025 COMPLETE CBC W/AUTO DIFF WBC: CPT

## 2025-05-06 PROCEDURE — 87077 CULTURE AEROBIC IDENTIFY: CPT

## 2025-05-06 PROCEDURE — 1100000000 HC RM PRIVATE

## 2025-05-06 PROCEDURE — 94761 N-INVAS EAR/PLS OXIMETRY MLT: CPT

## 2025-05-06 PROCEDURE — 85379 FIBRIN DEGRADATION QUANT: CPT

## 2025-05-06 PROCEDURE — 0202U NFCT DS 22 TRGT SARS-COV-2: CPT

## 2025-05-06 PROCEDURE — 87154 CUL TYP ID BLD PTHGN 6+ TRGT: CPT

## 2025-05-06 PROCEDURE — 6370000000 HC RX 637 (ALT 250 FOR IP): Performed by: NURSE PRACTITIONER

## 2025-05-06 PROCEDURE — 6360000002 HC RX W HCPCS: Performed by: FAMILY MEDICINE

## 2025-05-06 PROCEDURE — 6370000000 HC RX 637 (ALT 250 FOR IP): Performed by: FAMILY MEDICINE

## 2025-05-06 PROCEDURE — 87040 BLOOD CULTURE FOR BACTERIA: CPT

## 2025-05-06 PROCEDURE — 80053 COMPREHEN METABOLIC PANEL: CPT

## 2025-05-06 PROCEDURE — 81001 URINALYSIS AUTO W/SCOPE: CPT

## 2025-05-06 PROCEDURE — 87186 SC STD MICRODIL/AGAR DIL: CPT

## 2025-05-06 PROCEDURE — 2500000003 HC RX 250 WO HCPCS: Performed by: FAMILY MEDICINE

## 2025-05-06 RX ORDER — IOPAMIDOL 755 MG/ML
100 INJECTION, SOLUTION INTRAVASCULAR
Status: COMPLETED | OUTPATIENT
Start: 2025-05-06 | End: 2025-05-06

## 2025-05-06 RX ORDER — POTASSIUM CHLORIDE 750 MG/1
40 TABLET, EXTENDED RELEASE ORAL ONCE
Status: COMPLETED | OUTPATIENT
Start: 2025-05-06 | End: 2025-05-06

## 2025-05-06 RX ADMIN — BUMETANIDE 1 MG: 0.25 INJECTION INTRAMUSCULAR; INTRAVENOUS at 16:47

## 2025-05-06 RX ADMIN — BUMETANIDE 1 MG: 0.25 INJECTION INTRAMUSCULAR; INTRAVENOUS at 08:36

## 2025-05-06 RX ADMIN — EMPAGLIFLOZIN 10 MG: 10 TABLET, FILM COATED ORAL at 08:36

## 2025-05-06 RX ADMIN — CARVEDILOL 3.12 MG: 3.12 TABLET, FILM COATED ORAL at 08:36

## 2025-05-06 RX ADMIN — Medication 4.5 MG: at 21:31

## 2025-05-06 RX ADMIN — ONDANSETRON 4 MG: 2 INJECTION, SOLUTION INTRAMUSCULAR; INTRAVENOUS at 09:43

## 2025-05-06 RX ADMIN — POTASSIUM CHLORIDE 40 MEQ: 750 TABLET, FILM COATED, EXTENDED RELEASE ORAL at 08:35

## 2025-05-06 RX ADMIN — DONEPEZIL HYDROCHLORIDE 10 MG: 5 TABLET, FILM COATED ORAL at 21:31

## 2025-05-06 RX ADMIN — SODIUM CHLORIDE, PRESERVATIVE FREE 10 ML: 5 INJECTION INTRAVENOUS at 21:34

## 2025-05-06 RX ADMIN — SODIUM CHLORIDE, PRESERVATIVE FREE 10 ML: 5 INJECTION INTRAVENOUS at 08:36

## 2025-05-06 RX ADMIN — TAMSULOSIN HYDROCHLORIDE 0.4 MG: 0.4 CAPSULE ORAL at 21:31

## 2025-05-06 RX ADMIN — IOPAMIDOL 100 ML: 755 INJECTION, SOLUTION INTRAVENOUS at 15:43

## 2025-05-06 RX ADMIN — PIPERACILLIN AND TAZOBACTAM 4500 MG: 4; .5 INJECTION, POWDER, FOR SOLUTION INTRAVENOUS at 17:26

## 2025-05-06 ASSESSMENT — PAIN SCALES - GENERAL: PAINLEVEL_OUTOF10: 3

## 2025-05-06 NOTE — PROGRESS NOTES
0924: Made Dr Carolina aware that patient Wife & daughter, said concerned because patient was \"shaking\". RN observed patient \"shaking\"  then patient walked to BR. Wife said had been lightheaded, Will do orthostatic VS soon. No signs of seizures. Is urinating often, c/o lower abdomen. pain. and at times dysuria. Will place external catheter. RN asked MD if would like UA and culture collected and would MD like ABD US? Advised MD patient may be constipated per wife. MD ordered UA and reviewing chart.      0936: RN made Dr Carolina aware that patient was belching a lot and just vomited, wife said was nauseated before breakfast and ate some breakfast and took pills. Emesis large amount food contents. Approximately 450 ml. No new orders from MD. RN gave zofran prn for nausea and sent urine to lab.

## 2025-05-06 NOTE — PROGRESS NOTES
[facility] CARDIOLOGY                    Cardiology Care Note     []Initial Encounter     [x]Follow-up    Patient Name: Aakash Faust - :1945 - MRN:[id number]  Primary Cardiologist: Aakash Murcia MD, Ernst Parmar MD  Consulting Cardiologist: José Zavala MD     Reason for encounter: CHF    HPI:       Aakash Faust is a 79 y.o. male with PMH significant for HFrEF s/p BiV ICD, s/p TAVR 2024, CAD s/p CABG, PVC's, HLD, prostate CA and dementia.     Pt presented to the ED due to complaints of worsening SOB since . Can happen with activity and at rest, worse yesterday and associated with orthopnea. Wife states shortly after his device was reprogrammed his symptoms started. Has some lower ext edema off and on. No CP. Wife provides most of history.     Subjective:      Aakash Faust reports breathing better.      Assessment and Plan     Acute on chronic HFrEF, s/p [employer] BiV ICD  - recent echo w/ EF 20-25%, was 35-40% after TAVR  - no need to repeat echo  - pBNP 8566, repeat in AM   - cont IV bumex 1 mg bid for 1 more day, to PO tomorrow  - on losartan PTA, consider switching to Entresto  - Hold ARB/ARNi for now d/t BP to focus on diuresing  - cont low dose coreg w/ hold parameters  - started Jardiance  - K down to 3.4 today, start aldactone if BP allows   - likely cause of elevated troponin   - weight decreasing  - consulted EP, device functioning appropriately and still CRT pacing >95% of time      2. NSVT, PVCs  - ~28 beat run 25   - replete K to K > 4, Mg > 2  - interrogated device, functioning properly  - started on coreg     3. S/p TAVR  - on ASA  - recent echo w/ normal valve function     4. CAD s/p CABG  - s/p C 2024 w/ occluded SVG to RCA, patent SVG to Lcx, patent LIMA to LAD  - cont ASA   - allergy/intolerance to statins     5. Dementia   - cont Aricept     6. Thrombocytopenia  - appears chronic, monitor     Small hiatal hernia.  SMALL BOWEL: No dilatation or wall thickening.  COLON: No dilatation or wall thickening. Sigmoid diverticulosis.  APPENDIX: Not distended.    PERITONEUM: No ascites or pneumoperitoneum.  RETROPERITONEUM: No lymphadenopathy or hemorrhage.    REPRODUCTIVE ORGANS: The prostate is enlarged, measuring 5.1 cm.  URINARY BLADDER: Dependent bladder stone.  BONES: Degenerative changes are seen in the thoracic and lumbar spine and hip  joints bilaterally.  ADDITIONAL COMMENTS: N/A    Impression  Sigmoid diverticulosis. Prostate enlargement. Bilateral nonobstructing renal  stones. Dependent bladder stone. No acute abnormality is identified.      Lab Results   Component Value Date/Time     05/06/2025 05:14 AM    K 3.4 05/06/2025 05:14 AM     05/06/2025 05:14 AM    CO2 26 05/06/2025 05:14 AM    BUN 22 05/06/2025 05:14 AM    CREATININE 1.35 05/06/2025 05:14 AM    GLUCOSE 125 05/06/2025 05:14 AM    CALCIUM 8.6 05/06/2025 05:14 AM    LABGLOM 53 05/06/2025 05:14 AM    LABGLOM >60 03/05/2024 10:33 AM      No results found for: \"BNP\"  Lab Results   Component Value Date    WBC 7.0 05/06/2025    HGB 12.2 05/06/2025    HCT 37.7 05/06/2025    MCV 88.9 05/06/2025     (L) 05/06/2025     No results for input(s): \"CHOL\", \"HDLC\", \"LDLC\", \"HBA1C\" in the last 72 hours.    Invalid input(s): \"TGL\"    Current meds:    Current Facility-Administered Medications:     potassium chloride (KLOR-CON) extended release tablet 40 mEq, 40 mEq, Oral, Once, Shasha Angeles APRN - NP    bumetanide (BUMEX) injection 1 mg, 1 mg, IntraVENous, BID, Alejandro Carolina MD, 1 mg at 05/05/25 1548    aspirin EC tablet 81 mg, 81 mg, Oral, Once per day on Monday Wednesday Friday, Alejandro Carolina MD, 81 mg at 05/05/25 1316    donepezil (ARICEPT) tablet 10 mg, 10 mg, Oral, Nightly, Alejandro Carolina MD, 10 mg at 05/05/25 2157    melatonin tablet 4.5 mg, 4.5 mg, Oral, Nightly PRN, Alejandro Carolina MD, 4.5 mg at 05/05/25 2200

## 2025-05-06 NOTE — PLAN OF CARE
Problem: Chronic Conditions and Co-morbidities  Goal: Patient's chronic conditions and co-morbidity symptoms are monitored and maintained or improved  Outcome: Progressing     Problem: Discharge Planning  Goal: Discharge to home or other facility with appropriate resources  Outcome: Progressing     Problem: Pain  Goal: Verbalizes/displays adequate comfort level or baseline comfort level  5/5/2025 2258 by Jane Lancaster, TREMAYNE  Outcome: Progressing  5/5/2025 1027 by Davina Mcdermott RN  Outcome: Progressing     Problem: Safety - Adult  Goal: Free from fall injury  5/5/2025 2258 by Jane Lancaster, TREMAYNE  Outcome: Progressing  5/5/2025 1027 by Davina Mcdermott, RN  Outcome: Progressing

## 2025-05-06 NOTE — PROGRESS NOTES
JEANNIE EPPS [facility]  [address] [place] [address] [phone]        Hospitalist Progress Note      NAME: Aakash Faust   :  1945  MRM:  [id number]    Date/Time of service: 2025  2:43 PM       Subjective:   Patient was personally seen and examined by me during this time period.  Chart reviewed.     Patient had some shaking whole body shaking this morning.  He also reports some dysuria and abdominal pain.  He threw up this morning.     ROS: per history above       Objective:       Vitals:       Last 24hrs VS reviewed since prior progress note. Most recent are:    Vitals:    25 1205   BP: 112/78   Pulse: (!) 115   Resp: 18   Temp: 99.5 °F (37.5 °C)   SpO2: 96%     SpO2 Readings from Last 6 Encounters:   25 96%   10/15/24 95%   24 95%   24 97%   24 93%   24 98%          Intake/Output Summary (Last 24 hours) at 2025 1443  Last data filed at 2025 1833  Gross per 24 hour   Intake --   Output 750 ml   Net -750 ml        Exam:     Physical Exam:    Gen:  Well-developed, well-nourished, in no acute distress  HEENT:  Pink conjunctivae, hearing intact to voice, moist mucous membranes  Neck:  Supple  Resp:  No accessory muscle use, clear breath sounds without wheezes rales or rhonchi  Card:  No murmurs, normal S1, S2 without thrills, bruits or peripheral edema  Abd:  Soft, non-tender, non-distended  Skin:  No rashes or ulcers, skin turgor is good  Neuro:  Cranial nerves 3-12 are grossly intact, follows commands appropriately  Psych:  Good insight, oriented to person, place and time, alert      Medications Reviewed: (see below)    Lab Data Reviewed: (see below)    ______________________________________________________________________    Medications:     Current Facility-Administered Medications   Medication Dose Route Frequency    bumetanide (BUMEX) injection 1 mg  1 mg IntraVENous BID    aspirin EC tablet 81 mg  81 mg Oral Once per  day on Monday Wednesday Friday    donepezil (ARICEPT) tablet 10 mg  10 mg Oral Nightly    melatonin tablet 4.5 mg  4.5 mg Oral Nightly PRN    tamsulosin (FLOMAX) capsule 0.4 mg  0.4 mg Oral Nightly    sodium chloride flush 0.9 % injection 5-40 mL  5-40 mL IntraVENous 2 times per day    sodium chloride flush 0.9 % injection 5-40 mL  5-40 mL IntraVENous PRN    0.9 % sodium chloride infusion   IntraVENous PRN    potassium chloride (KLOR-CON) extended release tablet 40 mEq  40 mEq Oral PRN    Or    potassium bicarb-citric acid (EFFER-K) effervescent tablet 40 mEq  40 mEq Oral PRN    Or    potassium chloride 10 mEq/100 mL IVPB (Peripheral Line)  10 mEq IntraVENous PRN    magnesium sulfate 2000 mg in 50 mL IVPB premix  2,000 mg IntraVENous PRN    enoxaparin (LOVENOX) injection 40 mg  40 mg SubCUTAneous Daily    ondansetron (ZOFRAN-ODT) disintegrating tablet 4 mg  4 mg Oral Q8H PRN    Or    ondansetron (ZOFRAN) injection 4 mg  4 mg IntraVENous Q6H PRN    polyethylene glycol (GLYCOLAX) packet 17 g  17 g Oral Daily PRN    acetaminophen (TYLENOL) tablet 650 mg  650 mg Oral Q6H PRN    Or    acetaminophen (TYLENOL) suppository 650 mg  650 mg Rectal Q6H PRN    carvedilol (COREG) tablet 3.125 mg  3.125 mg Oral BID WC    empagliflozin (JARDIANCE) tablet 10 mg  10 mg Oral Daily    ipratropium 0.5 mg-albuterol 2.5 mg (DUONEB) nebulizer solution 1 Dose  1 Dose Inhalation Q4H PRN          Lab Review:     Recent Labs     05/05/25 0531 05/06/25 0514   WBC 8.3 7.0   HGB 12.6 12.2   HCT 39.6 37.7   * 104*     Recent Labs     05/05/25 0531 05/06/25 0514    136   K 4.3 3.4*    102   CO2 25 26   BUN 19 22*   MG 1.9  --    ALT 23 17     No results found for: \"GLUCPOC\"       Assessment / Plan:     Principal Problem:    Acute on chronic systolic CHF (congestive heart failure) (HCC)  Active Problems:    Acute exacerbation of chronic heart failure (HCC)  Resolved Problems:    * No resolved hospital problems. *      Aakash

## 2025-05-07 LAB
ACB COMPLEX DNA BLD POS QL NAA+NON-PROBE: NOT DETECTED
ACCESSION NUMBER, LLC1M: NORMAL
ALBUMIN SERPL-MCNC: 2.9 G/DL (ref 3.5–5)
ALBUMIN/GLOB SERPL: 0.9 (ref 1.1–2.2)
ALP SERPL-CCNC: 78 U/L (ref 45–117)
ALT SERPL-CCNC: 16 U/L (ref 12–78)
ANION GAP SERPL CALC-SCNC: 6 MMOL/L (ref 2–12)
AST SERPL-CCNC: 11 U/L (ref 15–37)
B FRAGILIS DNA BLD POS QL NAA+NON-PROBE: NOT DETECTED
B PERT DNA SPEC QL NAA+PROBE: NOT DETECTED
BASOPHILS # BLD: 0.02 K/UL (ref 0–0.1)
BASOPHILS NFR BLD: 0.2 % (ref 0–1)
BILIRUB SERPL-MCNC: 2.4 MG/DL (ref 0.2–1)
BIOFIRE TEST COMMENT: NORMAL
BORDETELLA PARAPERTUSSIS BY PCR: NOT DETECTED
BUN SERPL-MCNC: 21 MG/DL (ref 6–20)
BUN/CREAT SERPL: 14 (ref 12–20)
C ALBICANS DNA BLD POS QL NAA+NON-PROBE: NOT DETECTED
C AURIS DNA BLD POS QL NAA+NON-PROBE: NOT DETECTED
C GATTII+NEOFOR DNA BLD POS QL NAA+N-PRB: NOT DETECTED
C GLABRATA DNA BLD POS QL NAA+NON-PROBE: NOT DETECTED
C KRUSEI DNA BLD POS QL NAA+NON-PROBE: NOT DETECTED
C PARAP DNA BLD POS QL NAA+NON-PROBE: NOT DETECTED
C PNEUM DNA SPEC QL NAA+PROBE: NOT DETECTED
C TROPICLS DNA BLD POS QL NAA+NON-PROBE: NOT DETECTED
CALCIUM SERPL-MCNC: 8.4 MG/DL (ref 8.5–10.1)
CHLORIDE SERPL-SCNC: 99 MMOL/L (ref 97–108)
CO2 SERPL-SCNC: 29 MMOL/L (ref 21–32)
CREAT SERPL-MCNC: 1.46 MG/DL (ref 0.7–1.3)
DIFFERENTIAL METHOD BLD: ABNORMAL
E CLOAC COMP DNA BLD POS NAA+NON-PROBE: NOT DETECTED
E COLI DNA BLD POS QL NAA+NON-PROBE: NOT DETECTED
E FAECALIS DNA BLD POS QL NAA+NON-PROBE: NOT DETECTED
E FAECIUM DNA BLD POS QL NAA+NON-PROBE: NOT DETECTED
EKG ATRIAL RATE: 89 BPM
EKG ATRIAL RATE: 96 BPM
EKG DIAGNOSIS: NORMAL
EKG P AXIS: 46 DEGREES
EKG P AXIS: 50 DEGREES
EKG P-R INTERVAL: 168 MS
EKG P-R INTERVAL: 172 MS
EKG Q-T INTERVAL: 408 MS
EKG Q-T INTERVAL: 416 MS
EKG Q-T INTERVAL: 426 MS
EKG QRS DURATION: 136 MS
EKG QRS DURATION: 152 MS
EKG QRS DURATION: 158 MS
EKG QTC CALCULATION (BAZETT): 515 MS
EKG QTC CALCULATION (BAZETT): 518 MS
EKG QTC CALCULATION (BAZETT): 522 MS
EKG R AXIS: 239 DEGREES
EKG R AXIS: 251 DEGREES
EKG R AXIS: 270 DEGREES
EKG T AXIS: -62 DEGREES
EKG T AXIS: 49 DEGREES
EKG T AXIS: 55 DEGREES
EKG VENTRICULAR RATE: 89 BPM
EKG VENTRICULAR RATE: 95 BPM
EKG VENTRICULAR RATE: 96 BPM
ENTEROBACTERALES DNA BLD POS NAA+N-PRB: NOT DETECTED
EOSINOPHIL # BLD: 0.06 K/UL (ref 0–0.4)
EOSINOPHIL NFR BLD: 0.7 % (ref 0–7)
ERYTHROCYTE [DISTWIDTH] IN BLOOD BY AUTOMATED COUNT: 14 % (ref 11.5–14.5)
FLUAV SUBTYP SPEC NAA+PROBE: NOT DETECTED
FLUBV RNA SPEC QL NAA+PROBE: NOT DETECTED
GLOBULIN SER CALC-MCNC: 3.2 G/DL (ref 2–4)
GLUCOSE SERPL-MCNC: 100 MG/DL (ref 65–100)
GP B STREP DNA BLD POS QL NAA+NON-PROBE: NOT DETECTED
HADV DNA SPEC QL NAA+PROBE: NOT DETECTED
HAEM INFLU DNA BLD POS QL NAA+NON-PROBE: NOT DETECTED
HCOV 229E RNA SPEC QL NAA+PROBE: NOT DETECTED
HCOV HKU1 RNA SPEC QL NAA+PROBE: NOT DETECTED
HCOV NL63 RNA SPEC QL NAA+PROBE: NOT DETECTED
HCOV OC43 RNA SPEC QL NAA+PROBE: NOT DETECTED
HCT VFR BLD AUTO: 36.6 % (ref 36.6–50.3)
HGB BLD-MCNC: 12 G/DL (ref 12.1–17)
HMPV RNA SPEC QL NAA+PROBE: NOT DETECTED
HPIV1 RNA SPEC QL NAA+PROBE: NOT DETECTED
HPIV2 RNA SPEC QL NAA+PROBE: NOT DETECTED
HPIV3 RNA SPEC QL NAA+PROBE: NOT DETECTED
HPIV4 RNA SPEC QL NAA+PROBE: NOT DETECTED
IMM GRANULOCYTES # BLD AUTO: 0.03 K/UL (ref 0–0.04)
IMM GRANULOCYTES NFR BLD AUTO: 0.3 % (ref 0–0.5)
K OXYTOCA DNA BLD POS QL NAA+NON-PROBE: NOT DETECTED
KLEBSIELLA SP DNA BLD POS QL NAA+NON-PRB: NOT DETECTED
KLEBSIELLA SP DNA BLD POS QL NAA+NON-PRB: NOT DETECTED
L MONOCYTOG DNA BLD POS QL NAA+NON-PROBE: NOT DETECTED
LYMPHOCYTES # BLD: 0.8 K/UL (ref 0.8–3.5)
LYMPHOCYTES NFR BLD: 8.8 % (ref 12–49)
M PNEUMO DNA SPEC QL NAA+PROBE: NOT DETECTED
MCH RBC QN AUTO: 28.6 PG (ref 26–34)
MCHC RBC AUTO-ENTMCNC: 32.8 G/DL (ref 30–36.5)
MCV RBC AUTO: 87.1 FL (ref 80–99)
MONOCYTES # BLD: 0.58 K/UL (ref 0–1)
MONOCYTES NFR BLD: 6.4 % (ref 5–13)
N MEN DNA BLD POS QL NAA+NON-PROBE: NOT DETECTED
NEUTS SEG # BLD: 7.61 K/UL (ref 1.8–8)
NEUTS SEG NFR BLD: 83.6 % (ref 32–75)
NRBC # BLD: 0 K/UL (ref 0–0.01)
NRBC BLD-RTO: 0 PER 100 WBC
NT PRO BNP: 7326 PG/ML
P AERUGINOSA DNA BLD POS NAA+NON-PROBE: NOT DETECTED
PLATELET # BLD AUTO: 118 K/UL (ref 150–400)
PMV BLD AUTO: 10.6 FL (ref 8.9–12.9)
POTASSIUM SERPL-SCNC: 3.9 MMOL/L (ref 3.5–5.1)
PROT SERPL-MCNC: 6.1 G/DL (ref 6.4–8.2)
PROTEUS SP DNA BLD POS QL NAA+NON-PROBE: NOT DETECTED
RBC # BLD AUTO: 4.2 M/UL (ref 4.1–5.7)
RESISTANT GENE TARGETS: NORMAL
RSV RNA SPEC QL NAA+PROBE: NOT DETECTED
RV+EV RNA SPEC QL NAA+PROBE: NOT DETECTED
S AUREUS DNA BLD POS QL NAA+NON-PROBE: NOT DETECTED
S AUREUS+CONS DNA BLD POS NAA+NON-PROBE: NOT DETECTED
S EPIDERMIDIS DNA BLD POS QL NAA+NON-PRB: NOT DETECTED
S LUGDUNENSIS DNA BLD POS QL NAA+NON-PRB: NOT DETECTED
S MALTOPHILIA DNA BLD POS QL NAA+NON-PRB: NOT DETECTED
S MARCESCENS DNA BLD POS NAA+NON-PROBE: NOT DETECTED
S PNEUM DNA BLD POS QL NAA+NON-PROBE: NOT DETECTED
S PYO DNA BLD POS QL NAA+NON-PROBE: NOT DETECTED
SALMONELLA DNA BLD POS QL NAA+NON-PROBE: NOT DETECTED
SARS-COV-2 RNA RESP QL NAA+PROBE: NOT DETECTED
SODIUM SERPL-SCNC: 134 MMOL/L (ref 136–145)
STREPTOCOCCUS DNA BLD POS NAA+NON-PROBE: NOT DETECTED
WBC # BLD AUTO: 9.1 K/UL (ref 4.1–11.1)

## 2025-05-07 PROCEDURE — 93010 ELECTROCARDIOGRAM REPORT: CPT | Performed by: SPECIALIST

## 2025-05-07 PROCEDURE — 83880 ASSAY OF NATRIURETIC PEPTIDE: CPT

## 2025-05-07 PROCEDURE — 2500000003 HC RX 250 WO HCPCS: Performed by: FAMILY MEDICINE

## 2025-05-07 PROCEDURE — 80053 COMPREHEN METABOLIC PANEL: CPT

## 2025-05-07 PROCEDURE — 36415 COLL VENOUS BLD VENIPUNCTURE: CPT

## 2025-05-07 PROCEDURE — 2580000003 HC RX 258: Performed by: FAMILY MEDICINE

## 2025-05-07 PROCEDURE — 6370000000 HC RX 637 (ALT 250 FOR IP): Performed by: NURSE PRACTITIONER

## 2025-05-07 PROCEDURE — 85025 COMPLETE CBC W/AUTO DIFF WBC: CPT

## 2025-05-07 PROCEDURE — 1100000000 HC RM PRIVATE

## 2025-05-07 PROCEDURE — 6370000000 HC RX 637 (ALT 250 FOR IP): Performed by: FAMILY MEDICINE

## 2025-05-07 PROCEDURE — 94761 N-INVAS EAR/PLS OXIMETRY MLT: CPT

## 2025-05-07 PROCEDURE — APPSS30 APP SPLIT SHARED TIME 16-30 MINUTES: Performed by: NURSE PRACTITIONER

## 2025-05-07 PROCEDURE — 6360000002 HC RX W HCPCS: Performed by: FAMILY MEDICINE

## 2025-05-07 RX ADMIN — TAMSULOSIN HYDROCHLORIDE 0.4 MG: 0.4 CAPSULE ORAL at 20:26

## 2025-05-07 RX ADMIN — PIPERACILLIN AND TAZOBACTAM 3375 MG: 3; .375 INJECTION, POWDER, LYOPHILIZED, FOR SOLUTION INTRAVENOUS at 22:33

## 2025-05-07 RX ADMIN — ASPIRIN 81 MG: 81 TABLET, COATED ORAL at 20:26

## 2025-05-07 RX ADMIN — CARVEDILOL 3.12 MG: 3.12 TABLET, FILM COATED ORAL at 08:13

## 2025-05-07 RX ADMIN — PIPERACILLIN AND TAZOBACTAM 3375 MG: 3; .375 INJECTION, POWDER, LYOPHILIZED, FOR SOLUTION INTRAVENOUS at 00:17

## 2025-05-07 RX ADMIN — PIPERACILLIN AND TAZOBACTAM 3375 MG: 3; .375 INJECTION, POWDER, LYOPHILIZED, FOR SOLUTION INTRAVENOUS at 16:08

## 2025-05-07 RX ADMIN — SODIUM CHLORIDE, PRESERVATIVE FREE 10 ML: 5 INJECTION INTRAVENOUS at 20:28

## 2025-05-07 RX ADMIN — CARVEDILOL 3.12 MG: 3.12 TABLET, FILM COATED ORAL at 17:56

## 2025-05-07 RX ADMIN — EMPAGLIFLOZIN 10 MG: 10 TABLET, FILM COATED ORAL at 08:13

## 2025-05-07 RX ADMIN — DONEPEZIL HYDROCHLORIDE 10 MG: 5 TABLET, FILM COATED ORAL at 20:26

## 2025-05-07 RX ADMIN — Medication 4.5 MG: at 22:26

## 2025-05-07 RX ADMIN — PIPERACILLIN AND TAZOBACTAM 3375 MG: 3; .375 INJECTION, POWDER, LYOPHILIZED, FOR SOLUTION INTRAVENOUS at 06:26

## 2025-05-07 NOTE — PROGRESS NOTES
JEANNIE [facility] CARDIOLOGY                    Cardiology Care Note     []Initial Encounter     [x]Follow-up    Patient Name: Aakash Faust - :1945 - MRN:[id number]  Primary Cardiologist: Aakash Murcia MD, Ernst Parmar MD  Consulting Cardiologist: José Zavala MD     Reason for encounter: CHF    HPI:       Aakash Faust is a 79 y.o. male with PMH significant for HFrEF s/p BiV ICD, s/p TAVR 2024, CAD s/p CABG, PVC's, HLD, prostate CA and dementia.     Pt presented to the ED due to complaints of worsening SOB since . Can happen with activity and at rest, worse yesterday and associated with orthopnea. Wife states shortly after his device was reprogrammed his symptoms started. Has some lower ext edema off and on. No CP. Wife provides most of history.     Subjective:      Aakash Faust reports breathing is unchanged. Wants to eat      Assessment and Plan     Acute on chronic HFrEF, s/p Medtronic BiV ICD  - recent echo w/ EF 20-25%, was 35-40% after TAVR  - no need to repeat echo  - pBNP 8566, 7326  - holding bumex for ALISHA, if Cr better in am then add bumex 1 mg po qd  - on losartan PTA, consider switching to Entresto  - Hold ARB/ARNi/aldactone alisha/low bp  - cont low dose coreg w/ hold parameters  - started Jardiance  - likely cause of elevated troponin   - weight decreasing  - consulted EP, device functioning appropriately and still CRT pacing >95% of time      2. NSVT, PVCs  - ~28 beat run 25   - replete lytes  to K > 4, Mg > 2  - interrogated device, functioning properly  - started on coreg     3. S/p TAVR  - on ASA  - recent echo w/ normal valve function     4. CAD s/p CABG  - s/p LHC 2024 w/ occluded SVG to RCA, patent SVG to Lcx, patent LIMA to LAD  - cont ASA   - allergy/intolerance to statins     5. Dementia   - cont Aricept     6. Thrombocytopenia  - appears chronic, monitor     7 diverticulitis:   - IV zosyn  - clear liq diet      D//w dtrs at bedside

## 2025-05-07 NOTE — PROGRESS NOTES
JEANNIE EPPS [facility]  [address] [place] [address] [phone]        Hospitalist Progress Note      NAME: Aakash Faust   :  1945  MRM:  [id number]    Date/Time of service: 2025  2:30 PM       Subjective:   Patient was personally seen and examined by me during this time period.  Chart reviewed.     Patient reports initially that he feels short of breath, but then denies shortness of breath.  Wants to eat.     ROS: per history above       Objective:       Vitals:       Last 24hrs VS reviewed since prior progress note. Most recent are:    Vitals:    25 1211   BP: 102/70   Pulse: 88   Resp: 17   Temp:    SpO2: 100%     SpO2 Readings from Last 6 Encounters:   25 100%   10/15/24 95%   24 95%   24 97%   24 93%   24 98%        No intake or output data in the 24 hours ending 25 1430       Exam:     Physical Exam:    Gen:  Well-developed, well-nourished, in no acute distress  HEENT:  Pink conjunctivae, hearing intact to voice, moist mucous membranes  Neck:  Supple  Resp:  No accessory muscle use, clear breath sounds without wheezes rales or rhonchi  Card:  No murmurs, normal S1, S2 without thrills, bruits or peripheral edema  Abd:  Soft, left lower quadrant tenderness to palpation. Nondistended.   Skin:  No rashes or ulcers, skin turgor is good  Neuro:  Cranial nerves 3-12 are grossly intact, follows commands appropriately  Psych:  Good insight, oriented to person, place and time, alert      Medications Reviewed: (see below)    Lab Data Reviewed: (see below)    ______________________________________________________________________    Medications:     Current Facility-Administered Medications   Medication Dose Route Frequency    piperacillin-tazobactam (ZOSYN) 3,375 mg in sodium chloride 0.9 % 50 mL IVPB (addEASE)  3,375 mg IntraVENous Q8H    [Held by provider] bumetanide (BUMEX) injection 1 mg  1 mg IntraVENous BID    aspirin EC

## 2025-05-07 NOTE — CARE COORDINATION
5/7/2025  2:38 PM      ICD-10-CM    1. Acute on chronic systolic CHF (congestive heart failure) (HCC)  I50.23       2. ALISHA (acute kidney injury)  N17.9       3. Shortness of breath  R06.02           General Risk Score: 4   Values used to calculate this score:    Points  Metrics       1        Age: 79       2        Hospital Admissions: 2       0        ED Visits: 0       0        Has Chronic Obstructive Pulmonary Disease: No       0        Has Diabetes Excluding Gestational Diabetes: No       1        Has Congestive Heart Failure: Yes       0        Has Liver Disease: No       0        Has Depression: No       0        Current PCP: Js Sanchez MD       0        Has Medicaid: No      CM reviewed EMR. No CM needs indicated at this time. Per IDR, estimated discharge date is 5/9/25. Providers, if needs arise, please consult case management.     Trinh Mulligan, MS  [phone]

## 2025-05-08 LAB
ALBUMIN SERPL-MCNC: 2.9 G/DL (ref 3.5–5)
ALBUMIN/GLOB SERPL: 0.8 (ref 1.1–2.2)
ALP SERPL-CCNC: 80 U/L (ref 45–117)
ALT SERPL-CCNC: 16 U/L (ref 12–78)
ANION GAP SERPL CALC-SCNC: 9 MMOL/L (ref 2–12)
APPEARANCE UR: CLEAR
AST SERPL-CCNC: 14 U/L (ref 15–37)
BACTERIA URNS QL MICRO: NEGATIVE /HPF
BASOPHILS # BLD: 0.02 K/UL (ref 0–0.1)
BASOPHILS NFR BLD: 0.3 % (ref 0–1)
BILIRUB SERPL-MCNC: 1.9 MG/DL (ref 0.2–1)
BILIRUB UR QL: NEGATIVE
BUN SERPL-MCNC: 24 MG/DL (ref 6–20)
BUN/CREAT SERPL: 17 (ref 12–20)
CALCIUM SERPL-MCNC: 8.7 MG/DL (ref 8.5–10.1)
CHLORIDE SERPL-SCNC: 98 MMOL/L (ref 97–108)
CO2 SERPL-SCNC: 26 MMOL/L (ref 21–32)
COLOR UR: ABNORMAL
CREAT SERPL-MCNC: 1.4 MG/DL (ref 0.7–1.3)
DIFFERENTIAL METHOD BLD: ABNORMAL
EOSINOPHIL # BLD: 0.03 K/UL (ref 0–0.4)
EOSINOPHIL NFR BLD: 0.4 % (ref 0–7)
EPITH CASTS URNS QL MICRO: ABNORMAL /LPF
ERYTHROCYTE [DISTWIDTH] IN BLOOD BY AUTOMATED COUNT: 14.1 % (ref 11.5–14.5)
GLOBULIN SER CALC-MCNC: 3.5 G/DL (ref 2–4)
GLUCOSE SERPL-MCNC: 105 MG/DL (ref 65–100)
GLUCOSE UR STRIP.AUTO-MCNC: 500 MG/DL
HCT VFR BLD AUTO: 36.2 % (ref 36.6–50.3)
HGB BLD-MCNC: 11.9 G/DL (ref 12.1–17)
HGB UR QL STRIP: ABNORMAL
HYALINE CASTS URNS QL MICRO: ABNORMAL /LPF (ref 0–2)
IMM GRANULOCYTES # BLD AUTO: 0.02 K/UL (ref 0–0.04)
IMM GRANULOCYTES NFR BLD AUTO: 0.3 % (ref 0–0.5)
KETONES UR QL STRIP.AUTO: NEGATIVE MG/DL
LEUKOCYTE ESTERASE UR QL STRIP.AUTO: NEGATIVE
LYMPHOCYTES # BLD: 0.51 K/UL (ref 0.8–3.5)
LYMPHOCYTES NFR BLD: 6.8 % (ref 12–49)
MCH RBC QN AUTO: 28.5 PG (ref 26–34)
MCHC RBC AUTO-ENTMCNC: 32.9 G/DL (ref 30–36.5)
MCV RBC AUTO: 86.8 FL (ref 80–99)
MONOCYTES # BLD: 0.68 K/UL (ref 0–1)
MONOCYTES NFR BLD: 9 % (ref 5–13)
NEUTS SEG # BLD: 6.28 K/UL (ref 1.8–8)
NEUTS SEG NFR BLD: 83.2 % (ref 32–75)
NITRITE UR QL STRIP.AUTO: NEGATIVE
NRBC # BLD: 0 K/UL (ref 0–0.01)
NRBC BLD-RTO: 0 PER 100 WBC
PH UR STRIP: 5.5 (ref 5–8)
PLATELET # BLD AUTO: 126 K/UL (ref 150–400)
PMV BLD AUTO: 10.5 FL (ref 8.9–12.9)
POTASSIUM SERPL-SCNC: 4.3 MMOL/L (ref 3.5–5.1)
PROT SERPL-MCNC: 6.4 G/DL (ref 6.4–8.2)
PROT UR STRIP-MCNC: 30 MG/DL
RBC # BLD AUTO: 4.17 M/UL (ref 4.1–5.7)
RBC #/AREA URNS HPF: ABNORMAL /HPF (ref 0–5)
SODIUM SERPL-SCNC: 133 MMOL/L (ref 136–145)
SP GR UR REFRACTOMETRY: 1.01 (ref 1–1.03)
URINE CULTURE IF INDICATED: ABNORMAL
UROBILINOGEN UR QL STRIP.AUTO: 2 EU/DL (ref 0.2–1)
WBC # BLD AUTO: 7.5 K/UL (ref 4.1–11.1)
WBC URNS QL MICRO: ABNORMAL /HPF (ref 0–4)

## 2025-05-08 PROCEDURE — 51798 US URINE CAPACITY MEASURE: CPT

## 2025-05-08 PROCEDURE — 1100000000 HC RM PRIVATE

## 2025-05-08 PROCEDURE — 85025 COMPLETE CBC W/AUTO DIFF WBC: CPT

## 2025-05-08 PROCEDURE — 87086 URINE CULTURE/COLONY COUNT: CPT

## 2025-05-08 PROCEDURE — 6360000002 HC RX W HCPCS: Performed by: FAMILY MEDICINE

## 2025-05-08 PROCEDURE — 2500000003 HC RX 250 WO HCPCS: Performed by: FAMILY MEDICINE

## 2025-05-08 PROCEDURE — 80053 COMPREHEN METABOLIC PANEL: CPT

## 2025-05-08 PROCEDURE — 81001 URINALYSIS AUTO W/SCOPE: CPT

## 2025-05-08 PROCEDURE — 99232 SBSQ HOSP IP/OBS MODERATE 35: CPT | Performed by: INTERNAL MEDICINE

## 2025-05-08 PROCEDURE — 6370000000 HC RX 637 (ALT 250 FOR IP): Performed by: FAMILY MEDICINE

## 2025-05-08 PROCEDURE — APPSS30 APP SPLIT SHARED TIME 16-30 MINUTES: Performed by: NURSE PRACTITIONER

## 2025-05-08 PROCEDURE — 94761 N-INVAS EAR/PLS OXIMETRY MLT: CPT

## 2025-05-08 PROCEDURE — 2580000003 HC RX 258: Performed by: FAMILY MEDICINE

## 2025-05-08 PROCEDURE — 6370000000 HC RX 637 (ALT 250 FOR IP): Performed by: NURSE PRACTITIONER

## 2025-05-08 PROCEDURE — 36415 COLL VENOUS BLD VENIPUNCTURE: CPT

## 2025-05-08 RX ORDER — MORPHINE SULFATE 2 MG/ML
2 INJECTION, SOLUTION INTRAMUSCULAR; INTRAVENOUS EVERY 4 HOURS PRN
Refills: 0 | Status: DISCONTINUED | OUTPATIENT
Start: 2025-05-08 | End: 2025-05-19 | Stop reason: HOSPADM

## 2025-05-08 RX ORDER — OXYCODONE HYDROCHLORIDE 5 MG/1
5 TABLET ORAL EVERY 4 HOURS PRN
Refills: 0 | Status: DISCONTINUED | OUTPATIENT
Start: 2025-05-08 | End: 2025-05-19 | Stop reason: HOSPADM

## 2025-05-08 RX ORDER — SODIUM CHLORIDE, SODIUM LACTATE, POTASSIUM CHLORIDE, CALCIUM CHLORIDE 600; 310; 30; 20 MG/100ML; MG/100ML; MG/100ML; MG/100ML
INJECTION, SOLUTION INTRAVENOUS CONTINUOUS
Status: DISPENSED | OUTPATIENT
Start: 2025-05-08 | End: 2025-05-08

## 2025-05-08 RX ADMIN — EMPAGLIFLOZIN 10 MG: 10 TABLET, FILM COATED ORAL at 09:09

## 2025-05-08 RX ADMIN — PIPERACILLIN AND TAZOBACTAM 3375 MG: 3; .375 INJECTION, POWDER, LYOPHILIZED, FOR SOLUTION INTRAVENOUS at 22:07

## 2025-05-08 RX ADMIN — PIPERACILLIN AND TAZOBACTAM 3375 MG: 3; .375 INJECTION, POWDER, LYOPHILIZED, FOR SOLUTION INTRAVENOUS at 06:53

## 2025-05-08 RX ADMIN — Medication 4.5 MG: at 22:03

## 2025-05-08 RX ADMIN — CARVEDILOL 3.12 MG: 3.12 TABLET, FILM COATED ORAL at 16:34

## 2025-05-08 RX ADMIN — ACETAMINOPHEN 650 MG: 325 TABLET ORAL at 03:15

## 2025-05-08 RX ADMIN — PIPERACILLIN AND TAZOBACTAM 3375 MG: 3; .375 INJECTION, POWDER, LYOPHILIZED, FOR SOLUTION INTRAVENOUS at 14:40

## 2025-05-08 RX ADMIN — SODIUM CHLORIDE, PRESERVATIVE FREE 10 ML: 5 INJECTION INTRAVENOUS at 09:10

## 2025-05-08 RX ADMIN — TAMSULOSIN HYDROCHLORIDE 0.4 MG: 0.4 CAPSULE ORAL at 22:03

## 2025-05-08 RX ADMIN — CARVEDILOL 3.12 MG: 3.12 TABLET, FILM COATED ORAL at 09:09

## 2025-05-08 RX ADMIN — OXYCODONE 5 MG: 5 TABLET ORAL at 11:13

## 2025-05-08 RX ADMIN — DONEPEZIL HYDROCHLORIDE 10 MG: 5 TABLET, FILM COATED ORAL at 22:03

## 2025-05-08 RX ADMIN — SODIUM CHLORIDE, SODIUM LACTATE, POTASSIUM CHLORIDE, AND CALCIUM CHLORIDE: .6; .31; .03; .02 INJECTION, SOLUTION INTRAVENOUS at 10:20

## 2025-05-08 RX ADMIN — OXYCODONE 5 MG: 5 TABLET ORAL at 15:23

## 2025-05-08 ASSESSMENT — PAIN DESCRIPTION - ORIENTATION
ORIENTATION: LEFT
ORIENTATION: LOWER

## 2025-05-08 ASSESSMENT — PAIN SCALES - GENERAL
PAINLEVEL_OUTOF10: 5
PAINLEVEL_OUTOF10: 5
PAINLEVEL_OUTOF10: 7

## 2025-05-08 ASSESSMENT — PAIN DESCRIPTION - LOCATION
LOCATION: ABDOMEN
LOCATION: GROIN

## 2025-05-08 ASSESSMENT — PAIN DESCRIPTION - DESCRIPTORS: DESCRIPTORS: ACHING

## 2025-05-08 NOTE — PROGRESS NOTES
Spiritual Health History and Assessment/Progress Note  [facility]    Initial Encounter,  ,  ,      Name: Aakash Faust MRN: [id number]    Age: 79 y.o.     Sex: male   Language: English   Mormon: Religious   Acute on chronic systolic CHF (congestive heart failure) (HCC)     Date: 5/8/2025            Total Time Calculated: 38 min              Spiritual Assessment began in [facility] A3 MULTI-SPECIALTY TELEMETRY        Referral/Consult From: Volunteer, Family   Encounter Overview/Reason: Initial Encounter  Service Provided For: Patient, Family    Sharifa, Belief, Meaning:   Patient is connected with a sharifa tradition or spiritual practice  Family/Friends are connected with a sharifa tradition or spiritual practice      Importance and Influence:  Patient has spiritual/personal beliefs that influence decisions regarding their health  Family/Friends have spiritual/personal beliefs that influence decisions regarding the patient's health    Community:  Patient feels well-supported. Support system includes: Spouse/Partner, Children, and Sharifa Community  Family/Friends feel well-supported. Support system includes: Spouse/Partner, Children, and Sharifa Community    Assessment and Plan of Care:   Patient Interventions include: Facilitated expression of thoughts and feelings and Provided sacramental/Anabaptism ritual  Family/Friends Interventions include: Facilitated expression of thoughts and feelings and Provided sacramental/Anabaptism ritual    Patient Plan of Care: Spiritual Care available upon further referral  Family/Friends Plan of Care: Spiritual Care available upon further referral    Chart review. Received referral from volunteer to visit patient. Checked in with patient's nurse prior to visitation. Patient's spouse is named Alanna. The patient prefers to be called Kuldeep. Patient and spouse have been  for fifty-four years. They have been doubles playing tennis together. They are a large Religious family. Patient

## 2025-05-08 NOTE — PROGRESS NOTES
JEANNIE EPPS [facility]  [address] [place] [address] [phone]        Hospitalist Progress Note      NAME: Aakash Faust   :  1945  MRM:  [id number]    Date/Time of service: 2025  3:41 PM       Subjective:   Patient was personally seen and examined by me during this time period.  Chart reviewed.     Still having some abdominal pain, but tolerating po.  Reports some dysuria today. No shortness of breath.     ROS: per history above       Objective:       Vitals:       Last 24hrs VS reviewed since prior progress note. Most recent are:    Vitals:    25 1536   BP: 107/81   Pulse: 91   Resp: 19   Temp: 97.9 °F (36.6 °C)   SpO2: 93%     SpO2 Readings from Last 6 Encounters:   25 93%   10/15/24 95%   24 95%   24 97%   24 93%   24 98%          Intake/Output Summary (Last 24 hours) at 2025 1541  Last data filed at 2025 0430  Gross per 24 hour   Intake --   Output 50 ml   Net -50 ml          Exam:     Physical Exam:    Gen:  Well-developed, well-nourished, in no acute distress  HEENT:  Pink conjunctivae, hearing intact to voice, moist mucous membranes  Neck:  Supple  Resp:  No accessory muscle use, clear breath sounds without wheezes rales or rhonchi  Card:  No murmurs, normal S1, S2 without thrills, bruits or peripheral edema  Abd:  Soft, left lower quadrant tenderness to palpation. Nondistended.   Skin:  No rashes or ulcers, skin turgor is good  Neuro:  Cranial nerves 3-12 are grossly intact, follows commands appropriately  Psych:  poor insight.       Medications Reviewed: (see below)    Lab Data Reviewed: (see below)    ______________________________________________________________________    Medications:     Current Facility-Administered Medications   Medication Dose Route Frequency    oxyCODONE (ROXICODONE) immediate release tablet 5 mg  5 mg Oral Q4H PRN    morphine (PF) injection 2 mg  2 mg IntraVENous Q4H PRN     piperacillin-tazobactam (ZOSYN) 3,375 mg in sodium chloride 0.9 % 50 mL IVPB (addEASE)  3,375 mg IntraVENous Q8H    [Held by provider] bumetanide (BUMEX) injection 1 mg  1 mg IntraVENous BID    aspirin EC tablet 81 mg  81 mg Oral Once per day on Monday Wednesday Friday    donepezil (ARICEPT) tablet 10 mg  10 mg Oral Nightly    melatonin tablet 4.5 mg  4.5 mg Oral Nightly PRN    tamsulosin (FLOMAX) capsule 0.4 mg  0.4 mg Oral Nightly    sodium chloride flush 0.9 % injection 5-40 mL  5-40 mL IntraVENous 2 times per day    sodium chloride flush 0.9 % injection 5-40 mL  5-40 mL IntraVENous PRN    0.9 % sodium chloride infusion   IntraVENous PRN    potassium chloride (KLOR-CON) extended release tablet 40 mEq  40 mEq Oral PRN    Or    potassium bicarb-citric acid (EFFER-K) effervescent tablet 40 mEq  40 mEq Oral PRN    Or    potassium chloride 10 mEq/100 mL IVPB (Peripheral Line)  10 mEq IntraVENous PRN    magnesium sulfate 2000 mg in 50 mL IVPB premix  2,000 mg IntraVENous PRN    enoxaparin (LOVENOX) injection 40 mg  40 mg SubCUTAneous Daily    ondansetron (ZOFRAN-ODT) disintegrating tablet 4 mg  4 mg Oral Q8H PRN    Or    ondansetron (ZOFRAN) injection 4 mg  4 mg IntraVENous Q6H PRN    polyethylene glycol (GLYCOLAX) packet 17 g  17 g Oral Daily PRN    acetaminophen (TYLENOL) tablet 650 mg  650 mg Oral Q6H PRN    Or    acetaminophen (TYLENOL) suppository 650 mg  650 mg Rectal Q6H PRN    carvedilol (COREG) tablet 3.125 mg  3.125 mg Oral BID WC    empagliflozin (JARDIANCE) tablet 10 mg  10 mg Oral Daily    ipratropium 0.5 mg-albuterol 2.5 mg (DUONEB) nebulizer solution 1 Dose  1 Dose Inhalation Q4H PRN          Lab Review:     Recent Labs     05/06/25  0514 05/07/25  0230 05/08/25  0253   WBC 7.0 9.1 7.5   HGB 12.2 12.0* 11.9*   HCT 37.7 36.6 36.2*   * 118* 126*     Recent Labs     05/06/25  0514 05/07/25  0230 05/08/25  0253    134* 133*   K 3.4* 3.9 4.3    99 98   CO2 26 29 26   BUN 22* 21* 24*   ALT

## 2025-05-08 NOTE — PROGRESS NOTES
JEANNIE [facility] CARDIOLOGY                    Cardiology Care Note     []Initial Encounter     [x]Follow-up    Patient Name: Aakash Faust - :1945 - MRN:[id number]  Primary Cardiologist: Aakash Murcia MD, Ernst Parmar MD  Consulting Cardiologist: José Zavala MD     Reason for encounter: CHF    HPI:       Aakash Faust is a 79 y.o. male with PMH significant for HFrEF s/p BiV ICD, s/p TAVR 2024, CAD s/p CABG, PVC's, HLD, prostate CA and dementia.     Pt presented to the ED due to complaints of worsening SOB since . Can happen with activity and at rest, worse yesterday and associated with orthopnea. Wife states shortly after his device was reprogrammed his symptoms started. Has some lower ext edema off and on. No CP. Wife provides most of history.     Subjective:      Aakash Faust reports breathing is unchanged, breathing comfortably while in bed.      Assessment and Plan     Acute on chronic HFrEF, s/p Medtronic BiV ICD  - recent echo w/ EF 20-25%, was 35-40% after TAVR  - no need to repeat echo  - pBNP 8566, 7326  - holding bumex for ALISHA  - on losartan PTA, consider switching to Entresto if BP/Cr allow   - Hold ARB/ARNi/aldactone alisha/low bp  - cont low dose coreg w/ hold parameters  - started Jardiance  - likely cause of elevated troponin   - weight decreasing  - consulted EP, device functioning appropriately and still CRT pacing >95% of time      2. NSVT, PVCs  - ~28 beat run 25   - replete lytes  to K > 4, Mg > 2  - interrogated device, functioning properly  - started on coreg     3. S/p TAVR  - on ASA  - recent echo w/ normal valve function     4. CAD s/p CABG  - s/p LHC 2024 w/ occluded SVG to RCA, patent SVG to Lcx, patent LIMA to LAD  - cont ASA   - allergy/intolerance to statins     5. Dementia   - cont Aricept     6. Thrombocytopenia  - appears chronic, monitor     7. Diverticulitis  - IV zosyn  - clear liq diet  - per primary

## 2025-05-09 LAB
ALBUMIN SERPL-MCNC: 2.9 G/DL (ref 3.5–5)
ALBUMIN/GLOB SERPL: 0.8 (ref 1.1–2.2)
ALP SERPL-CCNC: 77 U/L (ref 45–117)
ALT SERPL-CCNC: 18 U/L (ref 12–78)
ANION GAP SERPL CALC-SCNC: 8 MMOL/L (ref 2–12)
AST SERPL-CCNC: 17 U/L (ref 15–37)
BASOPHILS # BLD: 0.02 K/UL (ref 0–0.1)
BASOPHILS NFR BLD: 0.2 % (ref 0–1)
BILIRUB SERPL-MCNC: 1.6 MG/DL (ref 0.2–1)
BUN SERPL-MCNC: 24 MG/DL (ref 6–20)
BUN/CREAT SERPL: 17 (ref 12–20)
CALCIUM SERPL-MCNC: 8.7 MG/DL (ref 8.5–10.1)
CHLORIDE SERPL-SCNC: 97 MMOL/L (ref 97–108)
CO2 SERPL-SCNC: 25 MMOL/L (ref 21–32)
CREAT SERPL-MCNC: 1.4 MG/DL (ref 0.7–1.3)
DIFFERENTIAL METHOD BLD: ABNORMAL
EOSINOPHIL # BLD: 0 K/UL (ref 0–0.4)
EOSINOPHIL NFR BLD: 0 % (ref 0–7)
ERYTHROCYTE [DISTWIDTH] IN BLOOD BY AUTOMATED COUNT: 14.1 % (ref 11.5–14.5)
GLOBULIN SER CALC-MCNC: 3.7 G/DL (ref 2–4)
GLUCOSE SERPL-MCNC: 122 MG/DL (ref 65–100)
HCT VFR BLD AUTO: 36.7 % (ref 36.6–50.3)
HGB BLD-MCNC: 12.2 G/DL (ref 12.1–17)
IMM GRANULOCYTES # BLD AUTO: 0.04 K/UL (ref 0–0.04)
IMM GRANULOCYTES NFR BLD AUTO: 0.4 % (ref 0–0.5)
LYMPHOCYTES # BLD: 0.74 K/UL (ref 0.8–3.5)
LYMPHOCYTES NFR BLD: 6.7 % (ref 12–49)
MCH RBC QN AUTO: 28.6 PG (ref 26–34)
MCHC RBC AUTO-ENTMCNC: 33.2 G/DL (ref 30–36.5)
MCV RBC AUTO: 85.9 FL (ref 80–99)
MONOCYTES # BLD: 0.88 K/UL (ref 0–1)
MONOCYTES NFR BLD: 8 % (ref 5–13)
NEUTS SEG # BLD: 9.32 K/UL (ref 1.8–8)
NEUTS SEG NFR BLD: 84.7 % (ref 32–75)
NRBC # BLD: 0 K/UL (ref 0–0.01)
NRBC BLD-RTO: 0 PER 100 WBC
PLATELET # BLD AUTO: 136 K/UL (ref 150–400)
PMV BLD AUTO: 10.6 FL (ref 8.9–12.9)
POTASSIUM SERPL-SCNC: 4.2 MMOL/L (ref 3.5–5.1)
PROT SERPL-MCNC: 6.6 G/DL (ref 6.4–8.2)
RBC # BLD AUTO: 4.27 M/UL (ref 4.1–5.7)
RBC MORPH BLD: ABNORMAL
SODIUM SERPL-SCNC: 130 MMOL/L (ref 136–145)
WBC # BLD AUTO: 11 K/UL (ref 4.1–11.1)

## 2025-05-09 PROCEDURE — 85025 COMPLETE CBC W/AUTO DIFF WBC: CPT

## 2025-05-09 PROCEDURE — 6370000000 HC RX 637 (ALT 250 FOR IP): Performed by: FAMILY MEDICINE

## 2025-05-09 PROCEDURE — 94761 N-INVAS EAR/PLS OXIMETRY MLT: CPT

## 2025-05-09 PROCEDURE — 2500000003 HC RX 250 WO HCPCS: Performed by: FAMILY MEDICINE

## 2025-05-09 PROCEDURE — 99232 SBSQ HOSP IP/OBS MODERATE 35: CPT | Performed by: INTERNAL MEDICINE

## 2025-05-09 PROCEDURE — 6360000002 HC RX W HCPCS: Performed by: FAMILY MEDICINE

## 2025-05-09 PROCEDURE — 6370000000 HC RX 637 (ALT 250 FOR IP): Performed by: NURSE PRACTITIONER

## 2025-05-09 PROCEDURE — 36415 COLL VENOUS BLD VENIPUNCTURE: CPT

## 2025-05-09 PROCEDURE — 1100000000 HC RM PRIVATE

## 2025-05-09 PROCEDURE — 2580000003 HC RX 258: Performed by: FAMILY MEDICINE

## 2025-05-09 PROCEDURE — 80053 COMPREHEN METABOLIC PANEL: CPT

## 2025-05-09 PROCEDURE — APPSS30 APP SPLIT SHARED TIME 16-30 MINUTES: Performed by: NURSE PRACTITIONER

## 2025-05-09 RX ORDER — SENNOSIDES 8.6 MG/1
1 TABLET ORAL NIGHTLY
Status: DISCONTINUED | OUTPATIENT
Start: 2025-05-09 | End: 2025-05-17

## 2025-05-09 RX ORDER — POLYETHYLENE GLYCOL 3350 17 G/17G
17 POWDER, FOR SOLUTION ORAL DAILY
Status: DISCONTINUED | OUTPATIENT
Start: 2025-05-09 | End: 2025-05-17

## 2025-05-09 RX ADMIN — PIPERACILLIN AND TAZOBACTAM 3375 MG: 3; .375 INJECTION, POWDER, LYOPHILIZED, FOR SOLUTION INTRAVENOUS at 14:48

## 2025-05-09 RX ADMIN — EMPAGLIFLOZIN 10 MG: 10 TABLET, FILM COATED ORAL at 09:18

## 2025-05-09 RX ADMIN — POLYETHYLENE GLYCOL 3350 17 G: 17 POWDER, FOR SOLUTION ORAL at 17:04

## 2025-05-09 RX ADMIN — DONEPEZIL HYDROCHLORIDE 10 MG: 5 TABLET, FILM COATED ORAL at 21:51

## 2025-05-09 RX ADMIN — ASPIRIN 81 MG: 81 TABLET, COATED ORAL at 21:51

## 2025-05-09 RX ADMIN — PIPERACILLIN AND TAZOBACTAM 3375 MG: 3; .375 INJECTION, POWDER, LYOPHILIZED, FOR SOLUTION INTRAVENOUS at 21:58

## 2025-05-09 RX ADMIN — SENNOSIDES 8.6 MG: 8.6 TABLET, FILM COATED ORAL at 21:51

## 2025-05-09 RX ADMIN — CARVEDILOL 3.12 MG: 3.12 TABLET, FILM COATED ORAL at 17:12

## 2025-05-09 RX ADMIN — POLYETHYLENE GLYCOL 3350 17 G: 17 POWDER, FOR SOLUTION ORAL at 06:23

## 2025-05-09 RX ADMIN — PIPERACILLIN AND TAZOBACTAM 3375 MG: 3; .375 INJECTION, POWDER, LYOPHILIZED, FOR SOLUTION INTRAVENOUS at 06:26

## 2025-05-09 RX ADMIN — SODIUM CHLORIDE, PRESERVATIVE FREE 10 ML: 5 INJECTION INTRAVENOUS at 09:18

## 2025-05-09 RX ADMIN — TAMSULOSIN HYDROCHLORIDE 0.4 MG: 0.4 CAPSULE ORAL at 21:51

## 2025-05-09 RX ADMIN — ACETAMINOPHEN 650 MG: 325 TABLET ORAL at 17:09

## 2025-05-09 RX ADMIN — SODIUM CHLORIDE, PRESERVATIVE FREE 10 ML: 5 INJECTION INTRAVENOUS at 21:58

## 2025-05-09 ASSESSMENT — PAIN DESCRIPTION - DESCRIPTORS: DESCRIPTORS: ACHING

## 2025-05-09 ASSESSMENT — PAIN DESCRIPTION - ORIENTATION: ORIENTATION: LEFT

## 2025-05-09 ASSESSMENT — PAIN DESCRIPTION - LOCATION: LOCATION: ABDOMEN

## 2025-05-09 NOTE — PROGRESS NOTES
JEANNIE [facility] CARDIOLOGY                    Cardiology Care Note     []Initial Encounter     [x]Follow-up    Patient Name: Aakash Faust - :1945 - MRN:[id number]  Primary Cardiologist: Aakash Murcia MD, Ernst Parmar MD  Consulting Cardiologist: José Zavala MD     Reason for encounter: CHF    HPI:       Aakash Faust is a 79 y.o. male with PMH significant for HFrEF s/p BiV ICD, s/p TAVR 2024, CAD s/p CABG, PVC's, HLD, prostate CA and dementia.     Pt presented to the ED due to complaints of worsening SOB since . Can happen with activity and at rest, worse yesterday and associated with orthopnea. Wife states shortly after his device was reprogrammed his symptoms started. Has some lower ext edema off and on. No CP. Wife provides most of history.     Subjective:      Aakash Faust reports breathing is unchanged, breathing comfortably while in bed.      Assessment and Plan     Acute on chronic HFrEF, s/p Medtronic BiV ICD  - recent echo w/ EF 20-25%, was 35-40% after TAVR  - no need to repeat echo  - pBNP 8566, 7326  - holding bumex for ALISHA Cr 1.4   - on losartan PTA, consider switching to Entresto if BP/Cr allow   - Hold ARB/ARNi/aldactone alisha/low bp  - cont low dose coreg w/ hold parameters  - started Jardianc  - weight decreasing  - consulted EP, device functioning appropriately and still CRT pacing >95% of time      2. NSVT, PVCs  - ~28 beat run 25   - replete lytes  to K > 4, Mg > 2  - interrogated device, functioning properly  - started on coreg     3. S/p TAVR  - on ASA  - recent echo w/ normal valve function     4. CAD s/p CABG  - s/p LHC 2024 w/ occluded SVG to RCA, patent SVG to Lcx, patent LIMA to LAD  - cont ASA   - allergy/intolerance to statins     5. Dementia   - cont Aricept     6. Thrombocytopenia  - appears chronic, monitor     7. Diverticulitis  - IV zosyn  - full liquid diet  - per primary         Will sign off    Future Appointments   Date Time Provider Department

## 2025-05-09 NOTE — PROGRESS NOTES
JEANNIE EPPS [facility]  [address] [place] [address] [phone]        Hospitalist Progress Note      NAME: Aakash Faust   :  1945  MRM:  [id number]    Date/Time of service: 2025  4:14 PM       Subjective:   Patient was personally seen and examined by me during this time period.  Chart reviewed.     Still having some abdominal pain, but tolerating po. No shortness of breath.     ROS: per history above       Objective:       Vitals:       Last 24hrs VS reviewed since prior progress note. Most recent are:    Vitals:    25 1131   BP: 105/75   Pulse: 86   Resp: 19   Temp: 97.5 °F (36.4 °C)   SpO2: 94%     SpO2 Readings from Last 6 Encounters:   25 94%   10/15/24 95%   24 95%   24 97%   24 93%   24 98%          Intake/Output Summary (Last 24 hours) at 2025 1614  Last data filed at 2025 1449  Gross per 24 hour   Intake 650 ml   Output 350 ml   Net 300 ml          Exam:     Physical Exam:    Gen:  Well-developed, well-nourished, in no acute distress  HEENT:  Pink conjunctivae, hearing intact to voice, moist mucous membranes  Neck:  Supple  Resp:  No accessory muscle use, clear breath sounds without wheezes rales or rhonchi  Card:  No murmurs, normal S1, S2 without thrills, bruits or peripheral edema  Abd:  Soft, left lower quadrant tenderness to palpation. Nondistended.   Skin:  No rashes or ulcers, skin turgor is good  Neuro:  Cranial nerves 3-12 are grossly intact, follows commands appropriately  Psych:  poor insight.       Medications Reviewed: (see below)    Lab Data Reviewed: (see below)    ______________________________________________________________________    Medications:     Current Facility-Administered Medications   Medication Dose Route Frequency    senna (SENOKOT) tablet 8.6 mg  1 tablet Oral Nightly    polyethylene glycol (GLYCOLAX) packet 17 g  17 g Oral Daily    oxyCODONE (ROXICODONE) immediate release tablet 5 mg

## 2025-05-10 PROBLEM — R94.31 QT PROLONGATION: Status: ACTIVE | Noted: 2025-05-10

## 2025-05-10 PROBLEM — Z95.810 AICD (AUTOMATIC CARDIOVERTER/DEFIBRILLATOR) PRESENT: Status: ACTIVE | Noted: 2025-05-10

## 2025-05-10 PROBLEM — Z88.1: Status: ACTIVE | Noted: 2025-05-10

## 2025-05-10 PROBLEM — R78.81 BACTEREMIA DUE TO PSEUDOMONAS: Status: ACTIVE | Noted: 2025-05-10

## 2025-05-10 PROBLEM — B96.5 BACTEREMIA DUE TO PSEUDOMONAS: Status: ACTIVE | Noted: 2025-05-10

## 2025-05-10 PROBLEM — K57.20 DIVERTICULITIS OF LARGE INTESTINE WITH ABSCESS WITHOUT BLEEDING: Status: ACTIVE | Noted: 2025-05-10

## 2025-05-10 LAB
ALBUMIN SERPL-MCNC: 2.7 G/DL (ref 3.5–5)
ALBUMIN/GLOB SERPL: 0.8 (ref 1.1–2.2)
ALP SERPL-CCNC: 78 U/L (ref 45–117)
ALT SERPL-CCNC: 21 U/L (ref 12–78)
ANION GAP SERPL CALC-SCNC: 10 MMOL/L (ref 2–12)
AST SERPL-CCNC: 25 U/L (ref 15–37)
BACTERIA SPEC CULT: ABNORMAL
BACTERIA SPEC CULT: ABNORMAL
BACTERIA SPEC CULT: NORMAL
BASOPHILS # BLD: 0.02 K/UL (ref 0–0.1)
BASOPHILS NFR BLD: 0.2 % (ref 0–1)
BILIRUB SERPL-MCNC: 1.3 MG/DL (ref 0.2–1)
BUN SERPL-MCNC: 24 MG/DL (ref 6–20)
BUN/CREAT SERPL: 22 (ref 12–20)
CALCIUM SERPL-MCNC: 8.4 MG/DL (ref 8.5–10.1)
CHLORIDE SERPL-SCNC: 98 MMOL/L (ref 97–108)
CO2 SERPL-SCNC: 25 MMOL/L (ref 21–32)
CREAT SERPL-MCNC: 1.1 MG/DL (ref 0.7–1.3)
DIFFERENTIAL METHOD BLD: ABNORMAL
EOSINOPHIL # BLD: 0.02 K/UL (ref 0–0.4)
EOSINOPHIL NFR BLD: 0.2 % (ref 0–7)
ERYTHROCYTE [DISTWIDTH] IN BLOOD BY AUTOMATED COUNT: 13.9 % (ref 11.5–14.5)
GLOBULIN SER CALC-MCNC: 3.2 G/DL (ref 2–4)
GLUCOSE SERPL-MCNC: 89 MG/DL (ref 65–100)
HCT VFR BLD AUTO: 35.9 % (ref 36.6–50.3)
HGB BLD-MCNC: 11.7 G/DL (ref 12.1–17)
IMM GRANULOCYTES # BLD AUTO: 0.03 K/UL (ref 0–0.04)
IMM GRANULOCYTES NFR BLD AUTO: 0.3 % (ref 0–0.5)
LYMPHOCYTES # BLD: 0.87 K/UL (ref 0.8–3.5)
LYMPHOCYTES NFR BLD: 9.2 % (ref 12–49)
MCH RBC QN AUTO: 28.1 PG (ref 26–34)
MCHC RBC AUTO-ENTMCNC: 32.6 G/DL (ref 30–36.5)
MCV RBC AUTO: 86.3 FL (ref 80–99)
MONOCYTES # BLD: 0.74 K/UL (ref 0–1)
MONOCYTES NFR BLD: 7.9 % (ref 5–13)
NEUTS SEG # BLD: 7.73 K/UL (ref 1.8–8)
NEUTS SEG NFR BLD: 82.2 % (ref 32–75)
NRBC # BLD: 0 K/UL (ref 0–0.01)
NRBC BLD-RTO: 0 PER 100 WBC
PLATELET # BLD AUTO: 143 K/UL (ref 150–400)
PMV BLD AUTO: 11.4 FL (ref 8.9–12.9)
POTASSIUM SERPL-SCNC: 3.9 MMOL/L (ref 3.5–5.1)
PROT SERPL-MCNC: 5.9 G/DL (ref 6.4–8.2)
RBC # BLD AUTO: 4.16 M/UL (ref 4.1–5.7)
SERVICE CMNT-IMP: ABNORMAL
SERVICE CMNT-IMP: NORMAL
SODIUM SERPL-SCNC: 133 MMOL/L (ref 136–145)
WBC # BLD AUTO: 9.4 K/UL (ref 4.1–11.1)

## 2025-05-10 PROCEDURE — 6360000002 HC RX W HCPCS: Performed by: FAMILY MEDICINE

## 2025-05-10 PROCEDURE — 94640 AIRWAY INHALATION TREATMENT: CPT

## 2025-05-10 PROCEDURE — 36415 COLL VENOUS BLD VENIPUNCTURE: CPT

## 2025-05-10 PROCEDURE — 1100000000 HC RM PRIVATE

## 2025-05-10 PROCEDURE — 6370000000 HC RX 637 (ALT 250 FOR IP): Performed by: FAMILY MEDICINE

## 2025-05-10 PROCEDURE — 2580000003 HC RX 258: Performed by: FAMILY MEDICINE

## 2025-05-10 PROCEDURE — 80053 COMPREHEN METABOLIC PANEL: CPT

## 2025-05-10 PROCEDURE — 99223 1ST HOSP IP/OBS HIGH 75: CPT | Performed by: INTERNAL MEDICINE

## 2025-05-10 PROCEDURE — 87040 BLOOD CULTURE FOR BACTERIA: CPT

## 2025-05-10 PROCEDURE — 6360000002 HC RX W HCPCS: Performed by: HOSPITALIST

## 2025-05-10 PROCEDURE — 6370000000 HC RX 637 (ALT 250 FOR IP): Performed by: NURSE PRACTITIONER

## 2025-05-10 PROCEDURE — 2500000003 HC RX 250 WO HCPCS: Performed by: FAMILY MEDICINE

## 2025-05-10 PROCEDURE — 94761 N-INVAS EAR/PLS OXIMETRY MLT: CPT

## 2025-05-10 PROCEDURE — 2580000003 HC RX 258: Performed by: HOSPITALIST

## 2025-05-10 PROCEDURE — 85025 COMPLETE CBC W/AUTO DIFF WBC: CPT

## 2025-05-10 RX ADMIN — IPRATROPIUM BROMIDE AND ALBUTEROL SULFATE 1 DOSE: 2.5; .5 SOLUTION RESPIRATORY (INHALATION) at 22:01

## 2025-05-10 RX ADMIN — PIPERACILLIN AND TAZOBACTAM 3375 MG: 3; .375 INJECTION, POWDER, LYOPHILIZED, FOR SOLUTION INTRAVENOUS at 06:58

## 2025-05-10 RX ADMIN — SENNOSIDES 8.6 MG: 8.6 TABLET, FILM COATED ORAL at 21:48

## 2025-05-10 RX ADMIN — PIPERACILLIN AND TAZOBACTAM 3375 MG: 3; .375 INJECTION, POWDER, LYOPHILIZED, FOR SOLUTION INTRAVENOUS at 15:53

## 2025-05-10 RX ADMIN — DONEPEZIL HYDROCHLORIDE 10 MG: 5 TABLET, FILM COATED ORAL at 21:48

## 2025-05-10 RX ADMIN — SODIUM CHLORIDE, PRESERVATIVE FREE 10 ML: 5 INJECTION INTRAVENOUS at 09:49

## 2025-05-10 RX ADMIN — CARVEDILOL 3.12 MG: 3.12 TABLET, FILM COATED ORAL at 17:13

## 2025-05-10 RX ADMIN — POLYETHYLENE GLYCOL 3350 17 G: 17 POWDER, FOR SOLUTION ORAL at 09:49

## 2025-05-10 RX ADMIN — EMPAGLIFLOZIN 10 MG: 10 TABLET, FILM COATED ORAL at 09:49

## 2025-05-10 RX ADMIN — MEROPENEM 1000 MG: 1 INJECTION INTRAVENOUS at 17:17

## 2025-05-10 RX ADMIN — CARVEDILOL 3.12 MG: 3.12 TABLET, FILM COATED ORAL at 09:49

## 2025-05-10 RX ADMIN — Medication 4.5 MG: at 21:48

## 2025-05-10 RX ADMIN — ENOXAPARIN SODIUM 40 MG: 100 INJECTION SUBCUTANEOUS at 09:49

## 2025-05-10 RX ADMIN — TAMSULOSIN HYDROCHLORIDE 0.4 MG: 0.4 CAPSULE ORAL at 21:49

## 2025-05-10 NOTE — PROGRESS NOTES
Pt's  from [facility] stopped by the hospital room to provided the Sacrament of the Sick while family were present at bedside. Please contact spiritual health services for further assistance needed.    Adonis Vilchis Mdiv, [facility]  Staff    Spiritual Health Services  Paging Service [phone] (PATSY)

## 2025-05-10 NOTE — CONSULTS
Infectious Disease Consult    Impression/Plan   Pseudomonal bacteremia.  Suspect due to diverticulitis.  Repeat blood cultures sent today.  Pseudomonas with elevated TOMÁS's to cefepime and piperacillin-tazobactam.  I am concerned about development of resistance on long-term antibiotics which patient will need due to the abscess.  Antibiotics to be changed to meropenem..  Diverticulitis with with 2.2 x 1.0 cm right pelvic abscess.  Not amenable to drainage by IR.  Patient will need minimum 4 weeks of IV antibiotics at discharge.  This was discussed with wife at bedside who is in agreement  ALISHA.  Resolved  TAVR/AICD.  History of aortic stenosis.  Follow blood cultures  Dementia  History of ciprofloxacin and sulfa allergies  Prolonged QTc.  QTc 5/6 was 515    Anti-infectives:   Piperacillin-tazobactam    Subjective:   Date of Consultation:  May 10, 2025  Date of Admission: 5/5/2025   Referring Physician:     Patient is a 79 y.o. male with a past medical history significant for TAVR/AICD, dementia, and dyslipidemia who is being seen for bacteremia.    Patient was admitted to [facility] on 5/5/2025 with complaints of shortness of breath along with fatigue and exercise intolerance.    Patient initially treated for acute decompensated heart failure.  He developed rigors on the second day of admissio and a CT scan of the chest, abdomen, and pelvis was obtained as well as blood cultures.  Blood cultures returned growing Pseudomonas and CT revealed diverticulitis with a right pelvic abscess.    Patient has been started on piperacillin-tazobactam.  The infectious diseases service has been asked to assist with antibiotic management      Patient Active Problem List   Diagnosis    Coronary atherosclerosis    Mixed hyperlipidemia    Aortic stenosis    Cardiomyopathy (HCC)    Chronic systolic heart failure (HCC)    Biventricular ICD (implantable cardioverter-defibrillator) in place    Non-rheumatic aortic stenosis

## 2025-05-10 NOTE — PROGRESS NOTES
Hospitalist Progress Note      NAME:  Aakash Faust   :  1945  MRM:  [id number]    Date/Time: 5/10/2025  6:59 AM           Assessment / Plan:     Aakash Faust is a 79 y.o. male with dementia, nephrolithiasis, prostate cancer, CAD, and HFrEF.  He was admitted on 2025 for evaluation/management of acute decompensation of chronic systolic heart failure and ALISHA.  He was found to have acute diverticulitis and Pseudomonas aeruginosa bloodstream infection.    #Acute decompensation of chronic systolic heart failure: Reason for admission.  Improving.  TTE  showed LVEF 20 to 25% with severe global hypokinesis.  Approaching euvolemia, not requiring supplemental oxygen.  -Appreciate cardiology assistance  -Continue to hold bumetanide for now--will possibly resume tomorrow  -GDMT: Continue carvedilol and empagliflozin, both of which reportedly are not home meds.  Holding home losartan in setting of increased creatinine, consider switching to subcubitril/valsartan per cardiology    #Acute sigmoid diverticulitis with abscess: Seen on CT abdomen/pelvis .  Abscess is not in a good position to be drained percutaneously.  Pain improving.  -Change piperacillin/tazobactam to meropenem given MICs.  Anticipate at least 4 weeks of antibiotics on discharge  -Likely needs repeat CTA to ensure resolution of abscess as an outpatient    #Pseudomonas aeruginosa bacteremia: 1 of 4 samples collected on  is growing pansensitive Pseudomonas aeruginosa.  Suspect a true infection, GI source.  -Continue antibiotics as above  -Repeat blood cultures to ensure clearance  -Consult ID    #ALISHA: Resolved.  Secondary to bacteremia/diverticulitis.  -BMP tomorrow  -Holding home losartan for now, possibly start subcubitril/valsartan soon if ALISHA remains at day    #Hyponatremia: Mild, stable, and likely not clinically significant.    #NSVT: Occurred .  Pacemaker was interrogated per cardiology and is functioning properly.  -Continue

## 2025-05-11 LAB
ALBUMIN SERPL-MCNC: 2.8 G/DL (ref 3.5–5)
ALBUMIN/GLOB SERPL: 0.8 (ref 1.1–2.2)
ALP SERPL-CCNC: 79 U/L (ref 45–117)
ALT SERPL-CCNC: 28 U/L (ref 12–78)
ANION GAP SERPL CALC-SCNC: 8 MMOL/L (ref 2–12)
AST SERPL-CCNC: 27 U/L (ref 15–37)
BASOPHILS # BLD: 0.03 K/UL (ref 0–0.1)
BASOPHILS NFR BLD: 0.3 % (ref 0–1)
BILIRUB SERPL-MCNC: 0.9 MG/DL (ref 0.2–1)
BUN SERPL-MCNC: 26 MG/DL (ref 6–20)
BUN/CREAT SERPL: 24 (ref 12–20)
CALCIUM SERPL-MCNC: 8.8 MG/DL (ref 8.5–10.1)
CHLORIDE SERPL-SCNC: 101 MMOL/L (ref 97–108)
CO2 SERPL-SCNC: 26 MMOL/L (ref 21–32)
CREAT SERPL-MCNC: 1.1 MG/DL (ref 0.7–1.3)
DIFFERENTIAL METHOD BLD: ABNORMAL
EOSINOPHIL # BLD: 0.06 K/UL (ref 0–0.4)
EOSINOPHIL NFR BLD: 0.7 % (ref 0–7)
ERYTHROCYTE [DISTWIDTH] IN BLOOD BY AUTOMATED COUNT: 13.8 % (ref 11.5–14.5)
GLOBULIN SER CALC-MCNC: 3.7 G/DL (ref 2–4)
GLUCOSE SERPL-MCNC: 94 MG/DL (ref 65–100)
HCT VFR BLD AUTO: 37.7 % (ref 36.6–50.3)
HGB BLD-MCNC: 12.3 G/DL (ref 12.1–17)
IMM GRANULOCYTES # BLD AUTO: 0.05 K/UL (ref 0–0.04)
IMM GRANULOCYTES NFR BLD AUTO: 0.6 % (ref 0–0.5)
LYMPHOCYTES # BLD: 0.89 K/UL (ref 0.8–3.5)
LYMPHOCYTES NFR BLD: 10 % (ref 12–49)
MCH RBC QN AUTO: 28.1 PG (ref 26–34)
MCHC RBC AUTO-ENTMCNC: 32.6 G/DL (ref 30–36.5)
MCV RBC AUTO: 86.3 FL (ref 80–99)
MONOCYTES # BLD: 0.67 K/UL (ref 0–1)
MONOCYTES NFR BLD: 7.5 % (ref 5–13)
NEUTS SEG # BLD: 7.18 K/UL (ref 1.8–8)
NEUTS SEG NFR BLD: 80.9 % (ref 32–75)
NRBC # BLD: 0 K/UL (ref 0–0.01)
NRBC BLD-RTO: 0 PER 100 WBC
PLATELET # BLD AUTO: 160 K/UL (ref 150–400)
PMV BLD AUTO: 11.2 FL (ref 8.9–12.9)
POTASSIUM SERPL-SCNC: 3.8 MMOL/L (ref 3.5–5.1)
PROT SERPL-MCNC: 6.5 G/DL (ref 6.4–8.2)
RBC # BLD AUTO: 4.37 M/UL (ref 4.1–5.7)
SODIUM SERPL-SCNC: 135 MMOL/L (ref 136–145)
WBC # BLD AUTO: 8.9 K/UL (ref 4.1–11.1)

## 2025-05-11 PROCEDURE — 85025 COMPLETE CBC W/AUTO DIFF WBC: CPT

## 2025-05-11 PROCEDURE — 6370000000 HC RX 637 (ALT 250 FOR IP): Performed by: FAMILY MEDICINE

## 2025-05-11 PROCEDURE — 80053 COMPREHEN METABOLIC PANEL: CPT

## 2025-05-11 PROCEDURE — 2500000003 HC RX 250 WO HCPCS: Performed by: FAMILY MEDICINE

## 2025-05-11 PROCEDURE — 1100000000 HC RM PRIVATE

## 2025-05-11 PROCEDURE — 6370000000 HC RX 637 (ALT 250 FOR IP): Performed by: NURSE PRACTITIONER

## 2025-05-11 PROCEDURE — 6360000002 HC RX W HCPCS: Performed by: FAMILY MEDICINE

## 2025-05-11 PROCEDURE — 6370000000 HC RX 637 (ALT 250 FOR IP): Performed by: HOSPITALIST

## 2025-05-11 PROCEDURE — 2580000003 HC RX 258: Performed by: HOSPITALIST

## 2025-05-11 PROCEDURE — 6360000002 HC RX W HCPCS: Performed by: HOSPITALIST

## 2025-05-11 PROCEDURE — 94761 N-INVAS EAR/PLS OXIMETRY MLT: CPT

## 2025-05-11 PROCEDURE — 36415 COLL VENOUS BLD VENIPUNCTURE: CPT

## 2025-05-11 RX ORDER — METOPROLOL TARTRATE 25 MG/1
12.5 TABLET, FILM COATED ORAL 2 TIMES DAILY
Status: DISCONTINUED | OUTPATIENT
Start: 2025-05-11 | End: 2025-05-19 | Stop reason: HOSPADM

## 2025-05-11 RX ORDER — BUMETANIDE 1 MG/1
1 TABLET ORAL DAILY
Status: DISCONTINUED | OUTPATIENT
Start: 2025-05-11 | End: 2025-05-11

## 2025-05-11 RX ADMIN — MEROPENEM 1000 MG: 1 INJECTION INTRAVENOUS at 09:41

## 2025-05-11 RX ADMIN — SODIUM CHLORIDE, PRESERVATIVE FREE 10 ML: 5 INJECTION INTRAVENOUS at 21:39

## 2025-05-11 RX ADMIN — ENOXAPARIN SODIUM 40 MG: 100 INJECTION SUBCUTANEOUS at 09:34

## 2025-05-11 RX ADMIN — MEROPENEM 1000 MG: 1 INJECTION INTRAVENOUS at 17:06

## 2025-05-11 RX ADMIN — Medication 4.5 MG: at 21:39

## 2025-05-11 RX ADMIN — BUMETANIDE 1 MG: 1 TABLET ORAL at 09:33

## 2025-05-11 RX ADMIN — EMPAGLIFLOZIN 10 MG: 10 TABLET, FILM COATED ORAL at 09:33

## 2025-05-11 RX ADMIN — SENNOSIDES 8.6 MG: 8.6 TABLET, FILM COATED ORAL at 21:38

## 2025-05-11 RX ADMIN — DONEPEZIL HYDROCHLORIDE 10 MG: 5 TABLET, FILM COATED ORAL at 21:38

## 2025-05-11 RX ADMIN — METOPROLOL TARTRATE 12.5 MG: 25 TABLET, FILM COATED ORAL at 21:38

## 2025-05-11 RX ADMIN — TAMSULOSIN HYDROCHLORIDE 0.4 MG: 0.4 CAPSULE ORAL at 21:39

## 2025-05-11 RX ADMIN — SODIUM CHLORIDE, PRESERVATIVE FREE 10 ML: 5 INJECTION INTRAVENOUS at 09:34

## 2025-05-11 RX ADMIN — MEROPENEM 1000 MG: 1 INJECTION INTRAVENOUS at 01:06

## 2025-05-11 NOTE — PROGRESS NOTES
Hospitalist Progress Note      NAME:  Aakash Faust   :  1945  MRM:  [id number]    Date/Time: 2025  7:39 AM           Assessment / Plan:     Aakash Faust is a 79 y.o. male with dementia, nephrolithiasis, prostate cancer, CAD, and HFrEF.  He was admitted on 2025 for evaluation/management of acute decompensation of chronic systolic heart failure and ALISHA.  He was found to have acute diverticulitis and Pseudomonas aeruginosa bloodstream infection.    #Acute decompensation of chronic systolic heart failure: Reason for admission.  Seems to be euvolemic.  TTE  showed LVEF 20 to 25% with severe global hypokinesis.  Of note, at home, he does not use diuretics daily as he has had issues with hypotension--he ends up using them only a few times a week at most.  -Appreciate cardiology assistance  -Give p.o. bumetanide once today, spot dose thereafter  -GDMT: Continue empagliflozin which is reportedly new this admission.  Change carvedilol to metoprolol given his relative hypotension.  Previously holding home losartan in setting of increased creatinine, now continuing to hold due to relative hypotension but could start sacubitril/valsartan when BPs are bit better    #Acute sigmoid diverticulitis with abscess: Seen on CT abdomen/pelvis .  Abscess is not in a good position to be drained percutaneously.  Pain improving.  -Continue meropenem given MICs.  Anticipate at least 4 weeks of antibiotics on discharge  -Likely needs repeat CT abd/pelvis as an outpatient to ensure resolution of abscess    #Pseudomonas aeruginosa bacteremia: 1 of 4 samples collected on  is growing pansensitive Pseudomonas aeruginosa.  Surveillance cultures from 5/10 exhibiting no growth so far.  Suspect a true infection, GI source.  -Continue antibiotics as above  -Appreciate ID assistance    #ALISHA: Resolved.  Secondary to bacteremia/diverticulitis.  -BMP tomorrow  -Holding home losartan for now (due to relative

## 2025-05-12 PROBLEM — R78.81 BACTEREMIA: Status: ACTIVE | Noted: 2025-05-12

## 2025-05-12 PROBLEM — Z71.89 GOALS OF CARE, COUNSELING/DISCUSSION: Status: ACTIVE | Noted: 2025-05-12

## 2025-05-12 PROBLEM — Z51.5 PALLIATIVE CARE ENCOUNTER: Status: ACTIVE | Noted: 2025-05-12

## 2025-05-12 LAB
ALBUMIN SERPL-MCNC: 2.8 G/DL (ref 3.5–5)
ALBUMIN/GLOB SERPL: 0.8 (ref 1.1–2.2)
ALP SERPL-CCNC: 78 U/L (ref 45–117)
ALT SERPL-CCNC: 42 U/L (ref 12–78)
ANION GAP SERPL CALC-SCNC: 4 MMOL/L (ref 2–12)
AST SERPL-CCNC: 44 U/L (ref 15–37)
BACTERIA SPEC CULT: NORMAL
BASOPHILS # BLD: 0.04 K/UL (ref 0–0.1)
BASOPHILS NFR BLD: 0.5 % (ref 0–1)
BILIRUB SERPL-MCNC: 0.8 MG/DL (ref 0.2–1)
BUN SERPL-MCNC: 27 MG/DL (ref 6–20)
BUN/CREAT SERPL: 24 (ref 12–20)
CALCIUM SERPL-MCNC: 8.7 MG/DL (ref 8.5–10.1)
CHLORIDE SERPL-SCNC: 103 MMOL/L (ref 97–108)
CO2 SERPL-SCNC: 27 MMOL/L (ref 21–32)
CREAT SERPL-MCNC: 1.14 MG/DL (ref 0.7–1.3)
DIFFERENTIAL METHOD BLD: ABNORMAL
EOSINOPHIL # BLD: 0.13 K/UL (ref 0–0.4)
EOSINOPHIL NFR BLD: 1.6 % (ref 0–7)
ERYTHROCYTE [DISTWIDTH] IN BLOOD BY AUTOMATED COUNT: 13.8 % (ref 11.5–14.5)
GLOBULIN SER CALC-MCNC: 3.6 G/DL (ref 2–4)
GLUCOSE SERPL-MCNC: 113 MG/DL (ref 65–100)
HCT VFR BLD AUTO: 37.6 % (ref 36.6–50.3)
HGB BLD-MCNC: 12.2 G/DL (ref 12.1–17)
IMM GRANULOCYTES # BLD AUTO: 0.05 K/UL (ref 0–0.04)
IMM GRANULOCYTES NFR BLD AUTO: 0.6 % (ref 0–0.5)
LYMPHOCYTES # BLD: 1.1 K/UL (ref 0.8–3.5)
LYMPHOCYTES NFR BLD: 13.2 % (ref 12–49)
MCH RBC QN AUTO: 27.9 PG (ref 26–34)
MCHC RBC AUTO-ENTMCNC: 32.4 G/DL (ref 30–36.5)
MCV RBC AUTO: 86 FL (ref 80–99)
MONOCYTES # BLD: 0.72 K/UL (ref 0–1)
MONOCYTES NFR BLD: 8.7 % (ref 5–13)
NEUTS SEG # BLD: 6.28 K/UL (ref 1.8–8)
NEUTS SEG NFR BLD: 75.4 % (ref 32–75)
NRBC # BLD: 0 K/UL (ref 0–0.01)
NRBC BLD-RTO: 0 PER 100 WBC
PLATELET # BLD AUTO: 180 K/UL (ref 150–400)
PMV BLD AUTO: 10.7 FL (ref 8.9–12.9)
POTASSIUM SERPL-SCNC: 3.6 MMOL/L (ref 3.5–5.1)
PROT SERPL-MCNC: 6.4 G/DL (ref 6.4–8.2)
RBC # BLD AUTO: 4.37 M/UL (ref 4.1–5.7)
SERVICE CMNT-IMP: NORMAL
SODIUM SERPL-SCNC: 134 MMOL/L (ref 136–145)
WBC # BLD AUTO: 8.3 K/UL (ref 4.1–11.1)

## 2025-05-12 PROCEDURE — 1100000000 HC RM PRIVATE

## 2025-05-12 PROCEDURE — 2500000003 HC RX 250 WO HCPCS: Performed by: FAMILY MEDICINE

## 2025-05-12 PROCEDURE — 6370000000 HC RX 637 (ALT 250 FOR IP): Performed by: NURSE PRACTITIONER

## 2025-05-12 PROCEDURE — 97161 PT EVAL LOW COMPLEX 20 MIN: CPT

## 2025-05-12 PROCEDURE — 99232 SBSQ HOSP IP/OBS MODERATE 35: CPT | Performed by: INTERNAL MEDICINE

## 2025-05-12 PROCEDURE — 36415 COLL VENOUS BLD VENIPUNCTURE: CPT

## 2025-05-12 PROCEDURE — 2580000003 HC RX 258: Performed by: HOSPITALIST

## 2025-05-12 PROCEDURE — 97530 THERAPEUTIC ACTIVITIES: CPT

## 2025-05-12 PROCEDURE — 6370000000 HC RX 637 (ALT 250 FOR IP): Performed by: FAMILY MEDICINE

## 2025-05-12 PROCEDURE — 99222 1ST HOSP IP/OBS MODERATE 55: CPT | Performed by: NURSE PRACTITIONER

## 2025-05-12 PROCEDURE — 6360000002 HC RX W HCPCS: Performed by: HOSPITALIST

## 2025-05-12 PROCEDURE — 02HV33Z INSERTION OF INFUSION DEVICE INTO SUPERIOR VENA CAVA, PERCUTANEOUS APPROACH: ICD-10-PCS | Performed by: HOSPITALIST

## 2025-05-12 PROCEDURE — 97165 OT EVAL LOW COMPLEX 30 MIN: CPT

## 2025-05-12 PROCEDURE — 80053 COMPREHEN METABOLIC PANEL: CPT

## 2025-05-12 PROCEDURE — 85025 COMPLETE CBC W/AUTO DIFF WBC: CPT

## 2025-05-12 PROCEDURE — 6370000000 HC RX 637 (ALT 250 FOR IP): Performed by: HOSPITALIST

## 2025-05-12 PROCEDURE — 94761 N-INVAS EAR/PLS OXIMETRY MLT: CPT

## 2025-05-12 RX ORDER — SODIUM CHLORIDE 9 MG/ML
INJECTION, SOLUTION INTRAVENOUS PRN
Status: DISCONTINUED | OUTPATIENT
Start: 2025-05-12 | End: 2025-05-19 | Stop reason: HOSPADM

## 2025-05-12 RX ORDER — SENNA AND DOCUSATE SODIUM 50; 8.6 MG/1; MG/1
2 TABLET, FILM COATED ORAL DAILY PRN
Status: DISCONTINUED | OUTPATIENT
Start: 2025-05-12 | End: 2025-05-17

## 2025-05-12 RX ORDER — SODIUM CHLORIDE 0.9 % (FLUSH) 0.9 %
5-40 SYRINGE (ML) INJECTION PRN
Status: DISCONTINUED | OUTPATIENT
Start: 2025-05-12 | End: 2025-05-19 | Stop reason: HOSPADM

## 2025-05-12 RX ORDER — LIDOCAINE HYDROCHLORIDE 10 MG/ML
50 INJECTION, SOLUTION EPIDURAL; INFILTRATION; INTRACAUDAL; PERINEURAL ONCE
Status: DISCONTINUED | OUTPATIENT
Start: 2025-05-12 | End: 2025-05-17

## 2025-05-12 RX ORDER — SODIUM CHLORIDE 0.9 % (FLUSH) 0.9 %
5-40 SYRINGE (ML) INJECTION EVERY 12 HOURS SCHEDULED
Status: DISCONTINUED | OUTPATIENT
Start: 2025-05-12 | End: 2025-05-19 | Stop reason: HOSPADM

## 2025-05-12 RX ADMIN — TAMSULOSIN HYDROCHLORIDE 0.4 MG: 0.4 CAPSULE ORAL at 21:25

## 2025-05-12 RX ADMIN — ACETAMINOPHEN 650 MG: 325 TABLET ORAL at 04:17

## 2025-05-12 RX ADMIN — ASPIRIN 81 MG: 81 TABLET, COATED ORAL at 21:25

## 2025-05-12 RX ADMIN — SODIUM CHLORIDE: 0.9 INJECTION, SOLUTION INTRAVENOUS at 17:22

## 2025-05-12 RX ADMIN — POLYETHYLENE GLYCOL 3350 17 G: 17 POWDER, FOR SOLUTION ORAL at 08:03

## 2025-05-12 RX ADMIN — MEROPENEM 1000 MG: 1 INJECTION INTRAVENOUS at 08:07

## 2025-05-12 RX ADMIN — MEROPENEM 1000 MG: 1 INJECTION INTRAVENOUS at 17:29

## 2025-05-12 RX ADMIN — SODIUM CHLORIDE, PRESERVATIVE FREE 10 ML: 5 INJECTION INTRAVENOUS at 08:04

## 2025-05-12 RX ADMIN — METOPROLOL TARTRATE 12.5 MG: 25 TABLET, FILM COATED ORAL at 08:03

## 2025-05-12 RX ADMIN — METOPROLOL TARTRATE 12.5 MG: 25 TABLET, FILM COATED ORAL at 21:25

## 2025-05-12 RX ADMIN — MEROPENEM 1000 MG: 1 INJECTION INTRAVENOUS at 01:22

## 2025-05-12 RX ADMIN — Medication 4.5 MG: at 21:24

## 2025-05-12 RX ADMIN — DONEPEZIL HYDROCHLORIDE 10 MG: 5 TABLET, FILM COATED ORAL at 21:25

## 2025-05-12 RX ADMIN — EMPAGLIFLOZIN 10 MG: 10 TABLET, FILM COATED ORAL at 08:03

## 2025-05-12 RX ADMIN — SENNOSIDES, DOCUSATE SODIUM 2 TABLET: 50; 8.6 TABLET, FILM COATED ORAL at 21:24

## 2025-05-12 ASSESSMENT — PAIN DESCRIPTION - LOCATION: LOCATION: ABDOMEN

## 2025-05-12 ASSESSMENT — PAIN DESCRIPTION - ORIENTATION: ORIENTATION: LOWER

## 2025-05-12 ASSESSMENT — PAIN DESCRIPTION - DESCRIPTORS: DESCRIPTORS: CRAMPING

## 2025-05-12 ASSESSMENT — PAIN SCALES - GENERAL
PAINLEVEL_OUTOF10: 7
PAINLEVEL_OUTOF10: 3

## 2025-05-12 NOTE — PROGRESS NOTES
Hospitalist Progress Note      NAME:  Aakash Faust   :  1945  MRM:  [id number]    Date/Time: 2025  7:48 AM           Assessment / Plan:     Aaksah Faust is a 79 y.o. male with dementia, nephrolithiasis, prostate cancer, CAD, and HFrEF.  He was admitted on 2025 for evaluation/management of acute decompensation of chronic systolic heart failure and ALISHA.  He was found to have acute diverticulitis and Pseudomonas aeruginosa bloodstream infection.    #Acute decompensation of chronic systolic heart failure: Reason for admission.  Euvolemic.  TTE  showed LVEF 20 to 25% with severe global hypokinesis.  Of note, at home, he does not use diuretics daily as he has had issues with hypotension-- he ends up using them only a few times a week at most.  -Appreciate cardiology assistance  -Hold off on further diuresis for now  -GDMT: Continue empagliflozin which is reportedly new this admission.  Continue metoprolol (changed from carvedilol due to soft BPs).  Previously holding home losartan in setting of increased creatinine, now continuing to hold due to relative hypotension but could start sacubitril/valsartan when BPs are bit better    #Acute sigmoid diverticulitis with abscess: Seen on CT abdomen/pelvis .  Abscess is not in a good position to be drained percutaneously.  Pain improving.  -Continue meropenem given MICs  -Likely repeat CT abd/pelvis before d/c to help determine duration of therapy-- maybe tomorrow?    #Pseudomonas aeruginosa bacteremia: 1 of 4 samples collected on  is growing pansensitive Pseudomonas aeruginosa.  Surveillance cultures from 5/10 exhibiting no growth so far.  Suspect a true infection, GI source.  -Continue antibiotics as above  -Appreciate ID assistance    #ALISHA: Resolved.  Secondary to bacteremia/diverticulitis.  -BMP tomorrow  -Holding home losartan for now (due to relative hypotension)    #Hyponatremia: Mild, stable, likely not clinically significant.    #NSVT:  epistaxis  Neck:  Supple, no apparent masses or swelling on inspection  Resp:  No accessory muscle use, clear breath sounds without wheezes, rales, or rhonchi  Card:  No murmurs, normal S1, S2 without thrills or bruits, no significant peripheral pitting edema  Abd:  Soft, nontender, no rebound or guarding, overall benign.  Mildly distended  Musc:  No cyanosis or clubbing  Skin:  No rashes or ulcers on examined skin, normal color  Neuro:  follows commands appropriately, motor function grossly intact, no tremors  Psych:  Good insight, normal affect, calm/cooperative, not apparently responding to internal stimuli       Medications Reviewed: (see below)    Lab Data Reviewed: (see below)    ______________________________________________________________________    Medications:     Current Facility-Administered Medications   Medication Dose Route Frequency    metoprolol tartrate (LOPRESSOR) tablet 12.5 mg  12.5 mg Oral BID    meropenem (MERREM) 1,000 mg in sodium chloride 0.9 % 100 mL IVPB (addEASE)  1,000 mg IntraVENous Q8H    senna (SENOKOT) tablet 8.6 mg  1 tablet Oral Nightly    polyethylene glycol (GLYCOLAX) packet 17 g  17 g Oral Daily    oxyCODONE (ROXICODONE) immediate release tablet 5 mg  5 mg Oral Q4H PRN    morphine (PF) injection 2 mg  2 mg IntraVENous Q4H PRN    aspirin EC tablet 81 mg  81 mg Oral Once per day on Monday Wednesday Friday    donepezil (ARICEPT) tablet 10 mg  10 mg Oral Nightly    melatonin tablet 4.5 mg  4.5 mg Oral Nightly PRN    tamsulosin (FLOMAX) capsule 0.4 mg  0.4 mg Oral Nightly    sodium chloride flush 0.9 % injection 5-40 mL  5-40 mL IntraVENous 2 times per day    sodium chloride flush 0.9 % injection 5-40 mL  5-40 mL IntraVENous PRN    0.9 % sodium chloride infusion   IntraVENous PRN    potassium chloride (KLOR-CON) extended release tablet 40 mEq  40 mEq Oral PRN    Or    potassium bicarb-citric acid (EFFER-K) effervescent tablet 40 mEq  40 mEq Oral PRN    Or    potassium chloride 10

## 2025-05-12 NOTE — PROGRESS NOTES
OCCUPATIONAL THERAPY EVALUATION/DISCHARGE  Patient: Aakash Faust (79 y.o. male)  Date: 5/12/2025  Primary Diagnosis: Shortness of breath [R06.02]  ALISHA (acute kidney injury) [N17.9]  Acute on chronic systolic CHF (congestive heart failure) (HCC) [I50.23]  Acute exacerbation of chronic heart failure (HCC) [I50.9]         Precautions:                    ASSESSMENT :  Patient is a pleasant 79 y.o. male who presented with SOB and admitted for CHF exacerbation. He has a history of dementia, CABG, and TAVR. Patient currently received in bed having recently returned to bed after being up in chair. He completed bed mobility independently and walked with SBA. Patient likely requires no more than Set-up to SBA with ADLs at this time. Patient BP and SpO2 stable throughout however SOB noted after activity, HR on SpO2 monitor briefly in the 40s before returning to resting HR of 71. Patient has no currently OT needs at this time.    Functional Outcome Measure:  The patient scored 21/24 on the AM-PAC outcome measure which is indicative of lower likelihood of requiring post acute rehab.      Further skilled acute occupational therapy is not indicated at this time.        PLAN :  Recommend with staff: Recommend with nursing, ADLs with supervision/set-up, OOB to chair 3x/day and toileting via functional mobility to and from bathroom. Thank you for completing as able in order to maintain patient strength, endurance and independence.      Recommendation for discharge: (in order for the patient to meet his/her long term goals):   No skilled occupational therapy    Other factors to consider for discharge: impaired cognition    IF patient discharges home will need the following DME: continuing to assess with progress     SUBJECTIVE:   Patient stated, “I'm breathing hard after moving around in bed.”    OBJECTIVE DATA SUMMARY:     Past Medical History:   Diagnosis Date    Coronary atherosclerosis of unspecified type of vessel, native or

## 2025-05-12 NOTE — CONSULTS
Palliative Medicine  Patient Name: Aakash Faust  YOB: 1945  MRN: [id number]  Age: 79 y.o.  Gender: male    Date of Initial Consult: 5/12/2025  Date of Service: 5/12/2025  Time: 6:05 PM  Provider: CARLOS Tipton NP  Hospital Day: 8  Admit Date: 5/5/2025  Referring Provider: Dr. Carolina      Reasons for Consultation:  Goals of Care and End Stage Disease    HISTORY OF PRESENT ILLNESS (HPI):   Aakash Faust is a 79 y.o. male with a past medical history of dementia, ischemic cardiomyopathy, EF 20-25% (ECHO 4/26/2025), Mod pulmonary HTN, s/p TAVR, s/p AICD, CAD s/p CABG, severe multivessel disease, occluded vein graft to RCA, patent graft to CX and patent LIMA to LAD, , prostate cancer, dementia, remote tobacco who presented from home with CC of worsening shortness of breath and weakness and was admitted on 5/5/2025 with a diagnosis of acute on chronic CHF, ALISHA, small left pleural effusion, left basilar atelectasis      5/6-blood Cx  + Pseudomonas aeruginosa.    Interval Abdominal CT for left lower abdominal pain, demonstrated sigmoid diverticulitis with a deep right pelvic sidewall phlegmon versus early abscess.  Also demonstrated bilateral nonobstructive nephrolithiasis.  pelvic abscess, not amenable to drainage by IR..    5/10- ALISHA resolved    ID following, will need minimum 4 weeks IV ABX.  Prolonged QTc.      Psychosocial: , 5 children, all very involved, 1 daughter lives in [place], the other 4 children live locally.  Multiple grandchildren.  Patient lives at home with his wife, their son is in the lower half of the home      PALLIATIVE DIAGNOSES:    Palliative care encounter  Abdominal abscess  Altered mental status, unspecified  DNR discussion  Goals of care discussion    ASSESSMENT AND PLAN:   Palliative consulted to assist with goals of care discussion with . Aakash Faust and wife in setting of multiple comorbidities including advanced heart failure with severely reduced EF,   [] Ascites  [] Other:  Neurological: [x] Normal speech  [] Normal sensation  []Deficits present:  Extremity: [x] Normal skin color/temp  [] Clubbing/cyanosis  [] No edema  [x] Other:wound foot, not assessed    Wt Readings from Last 15 Encounters:   05/12/25 79.7 kg (175 lb 12.8 oz)   04/25/25 81.6 kg (180 lb)   03/20/25 81.6 kg (180 lb)   10/28/24 85.8 kg (189 lb 3.2 oz)   10/21/24 84.5 kg (186 lb 3.2 oz)   10/15/24 84.5 kg (186 lb 3.2 oz)   10/15/24 83.5 kg (184 lb)   10/15/24 83.5 kg (184 lb)   09/25/24 84.6 kg (186 lb 6.4 oz)   09/09/24 85 kg (187 lb 6.4 oz)   09/04/24 84.6 kg (186 lb 6.4 oz)   08/26/24 84.5 kg (186 lb 3.2 oz)   08/19/24 84.1 kg (185 lb 6.4 oz)   08/12/24 84.6 kg (186 lb 6.4 oz)   08/06/24 85 kg (187 lb 6.4 oz)        Current Diet: ADULT DIET; Regular; Low Sodium (2 gm)       PSYCHOSOCIAL/SPIRITUAL SCREENING:   Palliative IDT has assessed this patient for cultural preferences / practices and a referral made as appropriate to needs (Cultural Services, Patient Advocacy, Ethics, etc.)    Spiritual Affiliation: Syn[employer]    Any spiritual / Confucianism concerns:  [] Yes /  [x] No   If \"Yes\" to discuss with pastoral care during IDT     Does caregiver feel burdened by caring for their loved one:   [] Yes /  [x] No /  [] No Caregiver Present/Available [] No Caregiver [] Pt Lives at Facility  If \"Yes\" to discuss with social work during IDT    Anticipatory grief assessment:   [x] Normal  / [] Maladaptive     If \"Maladaptive\" to discuss with social work during IDT    ESAS Anxiety: Anxiety Score: Not anxious    ESAS Depression: Depression Score: Not depressed        LAB AND IMAGING FINDINGS:   Objective data reviewed:  labs, images, records, medication use, vitals, and chart     FINAL COMMENTS   Thank you for allowing Palliative Medicine to participate in the care of Aakash Faust.    Only check if applicable and billing time based rather than MDM  [x] The total encounter time on this service date was __55__

## 2025-05-12 NOTE — PROGRESS NOTES
[facility] Progress Note  Yoni Dallas DO  [phone] Office  [phone] Fax    5/12/2025      Assessment & Plan:     Pseudomonal bacteremia.  Suspect due to diverticulitis.  Repeat blood cultures NGSF. Pseudomonas with elevated TOMÁS's to cefepime and piperacillin-tazobactam.  I am concerned about development of resistance on long-term antibiotics which patient will need due to the abscess.  Plan discharge on meropenem .  Tentative IV antibiotic orders below.  Would like to repeat CT prior to discharge to help determine duration of therapy.  Will order CT for tomorrow.  Diverticulitis with with 2.2 x 1.0 cm right pelvic abscess.  Not amenable to drainage by IR.  Patient will need minimum 4 weeks of IV antibiotics at discharge.  Family in agreement   ALISHA.  Resolved  TAVR/AICD.  History of aortic stenosis.  Follow blood cultures  Dementia  History of ciprofloxacin and sulfa allergies  Prolonged QTc.  QTc 5/6 was 515    Post Discharge PICC and Antibiotic Orders    1.  Diagnosis: Diverticular abscess.  Pseudomonal bacteremia  2.  Antibiotic: Meropenem 1 g IV every 8 hours through (TBD, minimum 6/7/2025)   3.  Routine PICC/ Sharad/ Portacath Care including PRN catheter flow management  4.  Weekly labs:   [x] CBC/diff/platelets   [x] BUN/Creatinine   [] CPK   [x] AST/TotalBilirubin/AlkalinePhosphatase   [x] CRP   []Trough Vancomycin level goal 15-20  5.  Fax lab to [phone]  Call Critical Lab Values to [phone]  6.  May send to IR for line evaluation or replacement [phone]  7.  Home Health to pull PIC line at end of therapy or send to IR for Sharad removal  8.  Allergies:    Allergies   Allergen Reactions    Hydromorphone      Other reaction(s): Unknown (comments)  BP problems    Statins Myalgia     AND MEMORY LOSS     Ciprofloxacin Other (See Comments) and Rash     fever    Sulfa Antibiotics Other (See Comments) and Rash     Fever     9. Pharmacy Consult for

## 2025-05-12 NOTE — CARE COORDINATION
3:25 [facility] has accepted patient.     Care Management Initial Assessment  5/12/2025 1:48 PM  If patient is discharged prior to next notation, then this note serves as note for discharge by case management.    Reason for Admission:   Shortness of breath [R06.02]  ALISHA (acute kidney injury) [N17.9]  Acute on chronic systolic CHF (congestive heart failure) (HCC) [I50.23]  Acute exacerbation of chronic heart failure (HCC) [I50.9]         Patient Admission Status: Inpatient  Date Admitted to INP: 5/5/25  RUR: Readmission Risk Score: 11.3    Hospitalization in the last 30 days (Readmission):  No        Advance Care Planning:  Code Status: DNR  Primary Healthcare Decision Maker:    Primary Decision Maker: Kirstin Faust - Spouse - [phone]   Advance Directive: DNI/DNR     __________________________________________________________________________  Assessment:      05/12/25 1344   Service Assessment   Patient Orientation Alert and Oriented;Person;Place;Situation;Self   Cognition Alert   History Provided By Spouse  (Kirstin Faust-wife)   Primary Caregiver Self   Accompanied By/Relationship Wife Kirstin   Support Systems Spouse/Significant Other   PCP Verified by CM Yes  (Dr. Js Sanchez)   Last Visit to PCP Within last 3 months  (within the last 2 weeks)   Prior Functional Level Independent in ADLs/IADLs   Current Functional Level Independent in ADLs/IADLs   Can patient return to prior living arrangement Yes   Ability to make needs known: Good   Family able to assist with home care needs: Yes   Financial Resources Medicare;Other (Comment)  (BCBS)   Community Resources None   Social/Functional History   Lives With Spouse   Type of Home House   Bathroom Equipment None   Home Equipment None   Receives Help From Family   Prior Level of Assist for ADLs Independent   Prior Level of Assist for Homemaking Independent   Ambulation Assistance Independent   Prior Level of Assist for Transfers

## 2025-05-12 NOTE — PLAN OF CARE
Problem: Physical Therapy - Adult  Goal: By Discharge: Performs mobility at highest level of function for planned discharge setting.  See evaluation for individualized goals.  Description: FUNCTIONAL STATUS PRIOR TO ADMISSION: Pt reports indep LOF with mobility, no use of device at baseline. Wife provides supervision/ cues with ADLs due to pt h/o dementia.     HOME SUPPORT PRIOR TO ADMISSION: The patient lived with wife who provides supervision as needed; son lives in home as well.    Physical Therapy Goals  Initiated 5/12/2025  1.  Patient will move from supine to sit and sit to supine in bed with independence within 7 day(s).    2.  Patient will perform sit to stand with modified independence within 7 day(s).  3.  Patient will transfer from bed to chair and chair to bed with supervision/set-up using the least restrictive device within 7 day(s).  4.  Patient will ambulate with supervision/set-up for 150 feet with the least restrictive device within 7 day(s).   5.  Patient will ascend/descend 4 stairs with handrail(s) with contact guard assist within 7 day(s).   Outcome: Progressing   PHYSICAL THERAPY EVALUATION    Patient: Aakash Faust (79 y.o. male)  Date: 5/12/2025  Primary Diagnosis: Shortness of breath [R06.02]  ALISHA (acute kidney injury) [N17.9]  Acute on chronic systolic CHF (congestive heart failure) (HCC) [I50.23]  Acute exacerbation of chronic heart failure (HCC) [I50.9]       Precautions: Restrictions/Precautions  Restrictions/Precautions: Fall Risk            ASSESSMENT :   DEFICITS/IMPAIRMENTS:   The patient is limited by decreased functional mobility, strength, activity tolerance, safety awareness, cognition, attention/concentration, balance following admit with acute on chronic CHF, diverticulitis with abscess and bacteremia. PMH significant for CHF with EF 20%, TAVR, CABG, dementia, prostate CA.    Pt received with supportive wife present; eager to mobilize. Pt tolerated gait training on unit  discussed with: occupational therapist and registered nurse    Patient Education  Education Given To: Patient  Education Provided: Role of Therapy;Plan of Care;Fall Prevention Strategies;Mobility Training;Transfer Training  Education Method: Verbal  Barriers to Learning:  (impaired memory)  Education Outcome: Verbalized understanding;Continued education needed    Thank you for this referral.  Shasha Holguin, PT  Minutes: 15      Physical Therapy Evaluation Charge Determination   History Examination Presentation Decision-Making   HIGH Complexity :3+ comorbidities / personal factors will impact the outcome/ POC  MEDIUM Complexity : 3 Standardized tests and measures addressin body structure, function, activity limitation and / or participation in recreation  LOW Complexity : Stable, uncomplicated  AM-PAC  LOW    Based on the above components, the patient evaluation is determined to be of the following complexity level: Low

## 2025-05-12 NOTE — PROGRESS NOTES
PICC Line Insertion Procedure Note    Procedure: Insertion of #4 FR PICC    Indications:  Long Term IV therapy, Home IV therapy    Procedure Details   Informed consent was obtained for the procedure, including sedation.  Risks of lung perforation, hemorrhage, and adverse drug reaction were discussed.     #4 FR PICC inserted to the L Basilic vein per hospital protocol.   Blood return:  yes    Findings:  Catheter inserted to 47 cm, with 0 cm. Exposed. Mid upper arm circumference is 30 cm.  There were no changes to vital signs. Catheter was flushed with 20 cc NS. Patient did tolerate procedure well.    Recommendations:  Tip confirmation in SVC with 3CG, no CXR required.   OK for immediate use.  PICC Brochure given to patient with teaching instruction.

## 2025-05-13 ENCOUNTER — APPOINTMENT (OUTPATIENT)
Facility: HOSPITAL | Age: 80
DRG: 981 | End: 2025-05-13
Payer: MEDICARE

## 2025-05-13 ENCOUNTER — TELEPHONE (OUTPATIENT)
Age: 80
End: 2025-05-13

## 2025-05-13 PROBLEM — K57.20 PERFORATED DIVERTICULUM OF LARGE INTESTINE: Status: ACTIVE | Noted: 2025-05-13

## 2025-05-13 LAB
ALBUMIN SERPL-MCNC: 2.8 G/DL (ref 3.5–5)
ALBUMIN/GLOB SERPL: 0.8 (ref 1.1–2.2)
ALP SERPL-CCNC: 74 U/L (ref 45–117)
ALT SERPL-CCNC: 35 U/L (ref 12–78)
ANION GAP SERPL CALC-SCNC: 6 MMOL/L (ref 2–12)
AST SERPL-CCNC: 25 U/L (ref 15–37)
BASOPHILS # BLD: 0.03 K/UL (ref 0–0.1)
BASOPHILS NFR BLD: 0.4 % (ref 0–1)
BILIRUB SERPL-MCNC: 0.7 MG/DL (ref 0.2–1)
BUN SERPL-MCNC: 23 MG/DL (ref 6–20)
BUN/CREAT SERPL: 23 (ref 12–20)
CALCIUM SERPL-MCNC: 9.1 MG/DL (ref 8.5–10.1)
CHLORIDE SERPL-SCNC: 103 MMOL/L (ref 97–108)
CO2 SERPL-SCNC: 28 MMOL/L (ref 21–32)
CREAT SERPL-MCNC: 1.01 MG/DL (ref 0.7–1.3)
DIFFERENTIAL METHOD BLD: ABNORMAL
EOSINOPHIL # BLD: 0.14 K/UL (ref 0–0.4)
EOSINOPHIL NFR BLD: 2.1 % (ref 0–7)
ERYTHROCYTE [DISTWIDTH] IN BLOOD BY AUTOMATED COUNT: 14.1 % (ref 11.5–14.5)
GLOBULIN SER CALC-MCNC: 3.5 G/DL (ref 2–4)
GLUCOSE SERPL-MCNC: 106 MG/DL (ref 65–100)
HCT VFR BLD AUTO: 37.1 % (ref 36.6–50.3)
HGB BLD-MCNC: 12 G/DL (ref 12.1–17)
IMM GRANULOCYTES # BLD AUTO: 0.06 K/UL (ref 0–0.04)
IMM GRANULOCYTES NFR BLD AUTO: 0.9 % (ref 0–0.5)
LYMPHOCYTES # BLD: 1.27 K/UL (ref 0.8–3.5)
LYMPHOCYTES NFR BLD: 18.8 % (ref 12–49)
MCH RBC QN AUTO: 28.4 PG (ref 26–34)
MCHC RBC AUTO-ENTMCNC: 32.3 G/DL (ref 30–36.5)
MCV RBC AUTO: 87.7 FL (ref 80–99)
MONOCYTES # BLD: 0.58 K/UL (ref 0–1)
MONOCYTES NFR BLD: 8.6 % (ref 5–13)
NEUTS SEG # BLD: 4.66 K/UL (ref 1.8–8)
NEUTS SEG NFR BLD: 69.2 % (ref 32–75)
NRBC # BLD: 0 K/UL (ref 0–0.01)
NRBC BLD-RTO: 0 PER 100 WBC
PLATELET # BLD AUTO: 192 K/UL (ref 150–400)
PMV BLD AUTO: 10.4 FL (ref 8.9–12.9)
POTASSIUM SERPL-SCNC: 3.7 MMOL/L (ref 3.5–5.1)
PROT SERPL-MCNC: 6.3 G/DL (ref 6.4–8.2)
RBC # BLD AUTO: 4.23 M/UL (ref 4.1–5.7)
SODIUM SERPL-SCNC: 137 MMOL/L (ref 136–145)
WBC # BLD AUTO: 6.7 K/UL (ref 4.1–11.1)

## 2025-05-13 PROCEDURE — 6360000002 HC RX W HCPCS: Performed by: HOSPITALIST

## 2025-05-13 PROCEDURE — 1100000000 HC RM PRIVATE

## 2025-05-13 PROCEDURE — 6370000000 HC RX 637 (ALT 250 FOR IP): Performed by: NURSE PRACTITIONER

## 2025-05-13 PROCEDURE — 2580000003 HC RX 258: Performed by: HOSPITALIST

## 2025-05-13 PROCEDURE — 74177 CT ABD & PELVIS W/CONTRAST: CPT

## 2025-05-13 PROCEDURE — 97116 GAIT TRAINING THERAPY: CPT

## 2025-05-13 PROCEDURE — 99223 1ST HOSP IP/OBS HIGH 75: CPT | Performed by: PHYSICIAN ASSISTANT

## 2025-05-13 PROCEDURE — 6370000000 HC RX 637 (ALT 250 FOR IP): Performed by: FAMILY MEDICINE

## 2025-05-13 PROCEDURE — 6370000000 HC RX 637 (ALT 250 FOR IP): Performed by: HOSPITALIST

## 2025-05-13 PROCEDURE — 2500000003 HC RX 250 WO HCPCS: Performed by: FAMILY MEDICINE

## 2025-05-13 PROCEDURE — 6360000002 HC RX W HCPCS: Performed by: FAMILY MEDICINE

## 2025-05-13 PROCEDURE — 99233 SBSQ HOSP IP/OBS HIGH 50: CPT | Performed by: INTERNAL MEDICINE

## 2025-05-13 PROCEDURE — 80053 COMPREHEN METABOLIC PANEL: CPT

## 2025-05-13 PROCEDURE — 6360000004 HC RX CONTRAST MEDICATION: Performed by: INTERNAL MEDICINE

## 2025-05-13 PROCEDURE — 85025 COMPLETE CBC W/AUTO DIFF WBC: CPT

## 2025-05-13 PROCEDURE — 99223 1ST HOSP IP/OBS HIGH 75: CPT | Performed by: SURGERY

## 2025-05-13 PROCEDURE — 36415 COLL VENOUS BLD VENIPUNCTURE: CPT

## 2025-05-13 PROCEDURE — 94640 AIRWAY INHALATION TREATMENT: CPT

## 2025-05-13 PROCEDURE — 94761 N-INVAS EAR/PLS OXIMETRY MLT: CPT

## 2025-05-13 RX ORDER — IOPAMIDOL 755 MG/ML
100 INJECTION, SOLUTION INTRAVASCULAR
Status: COMPLETED | OUTPATIENT
Start: 2025-05-13 | End: 2025-05-13

## 2025-05-13 RX ADMIN — METOPROLOL TARTRATE 12.5 MG: 25 TABLET, FILM COATED ORAL at 20:51

## 2025-05-13 RX ADMIN — SODIUM CHLORIDE, PRESERVATIVE FREE 10 ML: 5 INJECTION INTRAVENOUS at 07:55

## 2025-05-13 RX ADMIN — SODIUM CHLORIDE, PRESERVATIVE FREE 10 ML: 5 INJECTION INTRAVENOUS at 20:49

## 2025-05-13 RX ADMIN — DONEPEZIL HYDROCHLORIDE 10 MG: 5 TABLET, FILM COATED ORAL at 20:49

## 2025-05-13 RX ADMIN — IOPAMIDOL 100 ML: 755 INJECTION, SOLUTION INTRAVENOUS at 06:57

## 2025-05-13 RX ADMIN — SODIUM CHLORIDE, PRESERVATIVE FREE 10 ML: 5 INJECTION INTRAVENOUS at 20:50

## 2025-05-13 RX ADMIN — MEROPENEM 1000 MG: 1 INJECTION INTRAVENOUS at 16:23

## 2025-05-13 RX ADMIN — SENNOSIDES 8.6 MG: 8.6 TABLET, FILM COATED ORAL at 20:49

## 2025-05-13 RX ADMIN — METOPROLOL TARTRATE 12.5 MG: 25 TABLET, FILM COATED ORAL at 07:50

## 2025-05-13 RX ADMIN — EMPAGLIFLOZIN 10 MG: 10 TABLET, FILM COATED ORAL at 07:50

## 2025-05-13 RX ADMIN — IPRATROPIUM BROMIDE AND ALBUTEROL SULFATE 1 DOSE: 2.5; .5 SOLUTION RESPIRATORY (INHALATION) at 21:21

## 2025-05-13 RX ADMIN — ENOXAPARIN SODIUM 40 MG: 100 INJECTION SUBCUTANEOUS at 07:52

## 2025-05-13 RX ADMIN — MEROPENEM 1000 MG: 1 INJECTION INTRAVENOUS at 01:26

## 2025-05-13 RX ADMIN — MEROPENEM 1000 MG: 1 INJECTION INTRAVENOUS at 07:57

## 2025-05-13 RX ADMIN — SODIUM CHLORIDE: 0.9 INJECTION, SOLUTION INTRAVENOUS at 07:57

## 2025-05-13 RX ADMIN — Medication 4.5 MG: at 20:48

## 2025-05-13 RX ADMIN — TAMSULOSIN HYDROCHLORIDE 0.4 MG: 0.4 CAPSULE ORAL at 20:49

## 2025-05-13 RX ADMIN — SODIUM CHLORIDE: 0.9 INJECTION, SOLUTION INTRAVENOUS at 16:23

## 2025-05-13 NOTE — PLAN OF CARE
Problem: Chronic Conditions and Co-morbidities  Goal: Patient's chronic conditions and co-morbidity symptoms are monitored and maintained or improved  Outcome: Progressing     Problem: Discharge Planning  Goal: Discharge to home or other facility with appropriate resources  Outcome: Progressing     Problem: Pain  Goal: Verbalizes/displays adequate comfort level or baseline comfort level  Outcome: Progressing     Problem: Safety - Adult  Goal: Free from fall injury  Outcome: Progressing

## 2025-05-13 NOTE — CONSULTS
sodium chloride infusion   IntraVENous PRN    lidocaine PF 1 % injection 50 mg  50 mg IntraDERmal Once    sennosides-docusate sodium (SENOKOT-S) 8.6-50 MG tablet 2 tablet  2 tablet Oral Daily PRN    metoprolol tartrate (LOPRESSOR) tablet 12.5 mg  12.5 mg Oral BID    meropenem (MERREM) 1,000 mg in sodium chloride 0.9 % 100 mL IVPB (addEASE)  1,000 mg IntraVENous Q8H    senna (SENOKOT) tablet 8.6 mg  1 tablet Oral Nightly    polyethylene glycol (GLYCOLAX) packet 17 g  17 g Oral Daily    oxyCODONE (ROXICODONE) immediate release tablet 5 mg  5 mg Oral Q4H PRN    morphine (PF) injection 2 mg  2 mg IntraVENous Q4H PRN    aspirin EC tablet 81 mg  81 mg Oral Once per day on Monday Wednesday Friday    donepezil (ARICEPT) tablet 10 mg  10 mg Oral Nightly    melatonin tablet 4.5 mg  4.5 mg Oral Nightly PRN    tamsulosin (FLOMAX) capsule 0.4 mg  0.4 mg Oral Nightly    sodium chloride flush 0.9 % injection 5-40 mL  5-40 mL IntraVENous 2 times per day    sodium chloride flush 0.9 % injection 5-40 mL  5-40 mL IntraVENous PRN    0.9 % sodium chloride infusion   IntraVENous PRN    potassium chloride (KLOR-CON) extended release tablet 40 mEq  40 mEq Oral PRN    Or    potassium bicarb-citric acid (EFFER-K) effervescent tablet 40 mEq  40 mEq Oral PRN    Or    potassium chloride 10 mEq/100 mL IVPB (Peripheral Line)  10 mEq IntraVENous PRN    magnesium sulfate 2000 mg in 50 mL IVPB premix  2,000 mg IntraVENous PRN    [Held by provider] enoxaparin (LOVENOX) injection 40 mg  40 mg SubCUTAneous Daily    ondansetron (ZOFRAN-ODT) disintegrating tablet 4 mg  4 mg Oral Q8H PRN    Or    ondansetron (ZOFRAN) injection 4 mg  4 mg IntraVENous Q6H PRN    polyethylene glycol (GLYCOLAX) packet 17 g  17 g Oral Daily PRN    acetaminophen (TYLENOL) tablet 650 mg  650 mg Oral Q6H PRN    Or    acetaminophen (TYLENOL) suppository 650 mg  650 mg Rectal Q6H PRN    empagliflozin (JARDIANCE) tablet 10 mg  10 mg Oral Daily    ipratropium 0.5 mg-albuterol 2.5 mg  (DUONEB) nebulizer solution 1 Dose  1 Dose Inhalation Q4H PRN      Allergies   Allergen Reactions    Hydromorphone      Other reaction(s): Unknown (comments)  BP problems    Statins Myalgia     AND MEMORY LOSS     Ciprofloxacin Other (See Comments) and Rash     fever    Sulfa Antibiotics Other (See Comments) and Rash     Fever       Review of Systems:REVIEW OF SYSTEMS:     []     Unable to obtain  ROS due to  []    mental status change  []    sedated   []    intubated   []    Total of 12 systems reviewed as follows:    Constitutional: +chills and malaise, neg for fevers, Eyes: negative for blurry vision  Ears, nose, mouth, throat, and face: negative for sore throat  Respiratory: negative for SOB  Cardiovascular: negative for CP  Gastrointestinal: +Abdominal pain, negative for nausea, vomiting, diarrhea, constipation, melena, hematochezia  Genitourinary: negative for dysuria  Integument/breast: neg for skin rash  Hematologic/lymphatic: neg for bruising  Musculoskeletal: negative for muscle aches  Neurological: no dizziness or h/a    Objective:      Patient Vitals for the past 8 hrs:   BP Temp Temp src Pulse Resp SpO2   25 1149 96/77 97.5 °F (36.4 °C) Oral 67 18 97 %   25 0745 109/79 97.7 °F (36.5 °C) Oral 68 17 99 %       Temp (24hrs), Av.6 °F (36.4 °C), Min:97.3 °F (36.3 °C), Max:97.7 °F (36.5 °C)      Physical Exam:  General:  Alert, cooperative, no distress, appears stated age.   Eyes:  Conjunctivae/corneas clear.    Nose: Nares normal. Septum midline   Mouth/Throat: Lips, mucosa, and tongue normal.    Neck: Supple, symmetrical, trachea midline   Lungs:   Clear to auscultation bilaterally.   Heart:  Regular rate and rhythm   Abdomen:   + Mild left lower quadrant tenderness, otherwise abdomen soft, non-tender, non-distended, no rebound, no guarding, no peritonitis   Extremities: Extremities normal, atraumatic, no cyanosis or edema.   Skin: Skin color, texture, turgor normal. No rashes or lesions

## 2025-05-13 NOTE — PROGRESS NOTES
Comprehensive Nutrition Assessment    Type and Reason for Visit:  Initial, LOS    Nutrition Recommendations/Plan:   Continue regular, 2 gm Na diet order     Malnutrition Assessment:  Malnutrition Status:  No malnutrition (05/13/25 1309)    Context:  Acute Illness     Findings of the 6 clinical characteristics of malnutrition:  Energy Intake:  No decrease in energy intake  Weight Loss:  No weight loss     Body Fat Loss:  No body fat loss     Muscle Mass Loss:  No muscle mass loss    Fluid Accumulation:  No fluid accumulation     Strength:  Not Performed    Nutrition Assessment:     Patient is an 79 year old male admitted with Shortness of breath [R06.02]  ALISHA (acute kidney injury) [N17.9]  Acute on chronic systolic CHF (congestive heart failure) (HCC) [I50.23]  Acute exacerbation of chronic heart failure (HCC) [I50.9]. He  has a past medical history of Coronary atherosclerosis of unspecified type of vessel, native or graft, Dementia (HCC), History of kidney stones, Mixed hyperlipidemia, Neuropathy of lower extremity, and Prostate cancer (HCC).  RD screen for LOS. Patient reports \"perfect\" appetite with no chewing/swallowing issues. NKFA. No meal intake documented but he reports consuming 100% of all meals, and having family bring meals in as well. No reported nausea/vomiting/diarrhea. States weight of 180-185# with no recent weight or appetite changes. Appears weight down 9# (4.7%) x 7 months, not clinically significant for timeframe. No complaints at this time.Lytes WDL. BG has been fairly stable throughout admission.    Wt Readings from Last 10 Encounters:   05/13/25 81.7 kg (180 lb 1.6 oz)   04/25/25 81.6 kg (180 lb)   03/20/25 81.6 kg (180 lb)   10/28/24 85.8 kg (189 lb 3.2 oz)   10/21/24 84.5 kg (186 lb 3.2 oz)   10/15/24 84.5 kg (186 lb 3.2 oz)   10/15/24 83.5 kg (184 lb)   10/15/24 83.5 kg (184 lb)   09/25/24 84.6 kg (186 lb 6.4 oz)   09/09/24 85 kg (187 lb 6.4 oz)     .Meal Intake:   No data  found.  Supplement Intake:  No data found.      Nutrition Related Findings:      Wound Type: None     Last BM: 05/12/25  Edema: None            RLE Edema: None  LLE Edema: None    Nutr. Labs:    Lab Results   Component Value Date    CREATININE 1.01 05/13/2025    BUN 23 (H) 05/13/2025     05/13/2025    K 3.7 05/13/2025     05/13/2025    CO2 28 05/13/2025       Lab Results   Component Value Date/Time    POCGLU 103 06/13/2024 11:28 AM    POCGLU 108 06/13/2024 11:04 AM    POCGLU 97 06/13/2024 10:26 AM        Hemoglobin A1C   Date Value Ref Range Status   06/05/2024 5.1 4.0 - 5.6 % Final     Comment:     (NOTE)  HbA1C Interpretive Ranges  <5.7              Normal  5.7 - 6.4         Consider Prediabetes  >6.5              Consider Diabetes         Lab Results   Component Value Date/Time    MG 1.9 05/05/2025 05:31 AM       Lab Results   Component Value Date    CALCIUM 9.1 05/13/2025       No results found for: \"TRIG\"    Nutr. Meds:  Scheduled Meds:   sodium chloride flush  5-40 mL IntraVENous 2 times per day    lidocaine 1 % injection  50 mg IntraDERmal Once    metoprolol tartrate  12.5 mg Oral BID    meropenem  1,000 mg IntraVENous Q8H    senna  1 tablet Oral Nightly    polyethylene glycol  17 g Oral Daily    aspirin  81 mg Oral Once per day on Monday Wednesday Friday    donepezil  10 mg Oral Nightly    tamsulosin  0.4 mg Oral Nightly    sodium chloride flush  5-40 mL IntraVENous 2 times per day    [Held by provider] enoxaparin  40 mg SubCUTAneous Daily    empagliflozin  10 mg Oral Daily     Continuous Infusions:   sodium chloride 5 mL/hr at 05/13/25 0757    sodium chloride       PRN Meds: sodium chloride flush, sodium chloride, sennosides-docusate sodium, oxyCODONE, morphine, melatonin, sodium chloride flush, sodium chloride, potassium chloride **OR** potassium alternative oral replacement **OR** potassium chloride, magnesium sulfate, ondansetron **OR** ondansetron, polyethylene glycol, acetaminophen **OR**

## 2025-05-13 NOTE — TELEPHONE ENCOUNTER
Patient spouse called in to reschedule appointment for her , because they cannot do it that late in the day due to her daughters confirmation. Patient and spouse's name and patient's  was verified. Informed patient Dr. Lainez did not have any earlier appointments in the day, but he could be scheduled with JUDY Sears. Spouse understood and rescheduled appointment with Rajeev.

## 2025-05-13 NOTE — CONSULTS
INTERVENTIONAL RADIOLOGY CONSULT NOTE    Consult received for pelvic drainage. Imaging removed. Fluid is deep in the upper pelvis. Currently, no safe percutaneous window for drainage. Recommend continuing medical management.          Connor Kay MD

## 2025-05-13 NOTE — PLAN OF CARE
Problem: Physical Therapy - Adult  Goal: By Discharge: Performs mobility at highest level of function for planned discharge setting.  See evaluation for individualized goals.  Description: FUNCTIONAL STATUS PRIOR TO ADMISSION: Pt reports indep LOF with mobility, no use of device at baseline. Wife provides supervision/ cues with ADLs due to pt h/o dementia.     HOME SUPPORT PRIOR TO ADMISSION: The patient lived with wife who provides supervision as needed; son lives in home as well.    Physical Therapy Goals  Initiated 5/12/2025  1.  Patient will move from supine to sit and sit to supine in bed with independence within 7 day(s).    2.  Patient will perform sit to stand with modified independence within 7 day(s).  3.  Patient will transfer from bed to chair and chair to bed with supervision/set-up using the least restrictive device within 7 day(s).  4.  Patient will ambulate with supervision/set-up for 150 feet with the least restrictive device within 7 day(s).   5.  Patient will ascend/descend 4 stairs with handrail(s) with contact guard assist within 7 day(s).   Outcome: Progressing     PHYSICAL THERAPY TREATMENT    Patient: Aakash Faust (79 y.o. male)  Date: 5/13/2025  Diagnosis: Shortness of breath [R06.02]  ALISHA (acute kidney injury) [N17.9]  Acute on chronic systolic CHF (congestive heart failure) (HCC) [I50.23]  Acute exacerbation of chronic heart failure (HCC) [I50.9] Acute on chronic systolic CHF (congestive heart failure) (HCC)  Procedure(s) (LRB):  SIGMOID COLECTOMY (N/A)    Precautions: Restrictions/Precautions  Restrictions/Precautions: Fall Risk            ASSESSMENT:  Patient continues to benefit from skilled PT services and is progressing towards goals. Patient tolerated session well. Ambulated 150 ft with SBA while using intermittent unilateral rail support. Reported persistent mild dizziness which limited distance negotiated. Patient will benefit from continued PT intervention to maximize

## 2025-05-13 NOTE — PRE-PROCEDURE INSTRUCTIONS
Patient schedule for surgery tomorrow. Jardiance was given this morning 5/13/25 at 0750. Medication was supposed to be held 3 days prior to surgery. Dr. Espinal and Dr. Lainez both notified and agree to move forward with procedure. Benefits outweigh risks for this patient. Patient's nurse, Vicki, notified and instructed to hold tomorrows dose for surgery.   
n/a

## 2025-05-13 NOTE — PROGRESS NOTES
Physical Therapy    Tx session attempted, however patient politely declined to mobilize after just recently settling back into bed. Per spouse, he has been up 3 times this morning so far. Will continue to follow.    Thank you,  Ander Wakefield, PT, DPT

## 2025-05-13 NOTE — PROGRESS NOTES
[facility] Progress Note  Yoni Dallas   [phone] Office  [phone] Fax    5/13/2025      Assessment & Plan:     Pseudomonal bacteremia.  Suspect due to diverticulitis.  Repeat blood cultures NGSF. Pseudomonas with elevated TOMÁS's to cefepime and piperacillin-tazobactam.  Continue meropenem.   Diverticulitis with with 2.2 x 1.0 cm right pelvic abscess.  Repeat CT shows significant increase in size of abscess.  Discussed with reading radiologist who has asked interventional radiologist to review.  If patient not a candidate for IR drainage then he will need surgery evaluation.  Check CRP in a.m.  ALISHA.  Resolved  TAVR/AICD.  History of aortic stenosis.  Follow blood cultures  Dementia  History of ciprofloxacin and sulfa allergies  Prolonged QTc.  QTc 5/6 was 515    Post Discharge PICC and Antibiotic Orders    1.  Diagnosis: Diverticular abscess.  Pseudomonal bacteremia  2.  Antibiotic: Meropenem 1 g IV every 8 hours through (TBD, minimum 6/7/2025)   3.  Routine PICC/ Sharad/ Portacath Care including PRN catheter flow management  4.  Weekly labs:   [x] CBC/diff/platelets   [x] BUN/Creatinine   [] CPK   [x] AST/TotalBilirubin/AlkalinePhosphatase   [x] CRP   []Trough Vancomycin level goal 15-20  5.  Fax lab to [phone]  Call Critical Lab Values to [phone]  6.  May send to IR for line evaluation or replacement [phone]  7.  Home Health to pull PIC line at end of therapy or send to IR for Sharad removal  8.  Allergies:    Allergies   Allergen Reactions    Hydromorphone      Other reaction(s): Unknown (comments)  BP problems    Statins Myalgia     AND MEMORY LOSS     Ciprofloxacin Other (See Comments) and Rash     fever    Sulfa Antibiotics Other (See Comments) and Rash     Fever     9. Pharmacy Consult for Vancomycin dosing      Yoni Dallas DO             Subjective:     No complaints    Objective:     Vitals: /79   Pulse 68   Temp 97.7 °F (36.5 °C)  clinical information in the electronic health records (e.g. problem list, visit note) on the day of the encounter       Yoni Dallas DO

## 2025-05-13 NOTE — PROGRESS NOTES
Spiritual Health History and Assessment/Progress Note  [facility]    Rituals, Rites and Sacraments,  ,  ,      Name: Aakash Faust MRN: [id number]    Age: 79 y.o.     Sex: male   Language: English   Mandaeism: Mosque   Acute on chronic systolic CHF (congestive heart failure) ([facility])     Date: 5/13/2025            Total Time Calculated: 5 min              Spiritual Assessment continued in [facility] A3 MULTI-SPECIALTY TELEMETRY        Referral/Consult From: Clergy/   Encounter Overview/Reason: Rituals, Rites and Sacraments  Service Provided For: Patient    Sharifa, Belief, Meaning:   Patient is connected with a sharifa tradition or spiritual practice  Family/Friends are connected with a sharifa tradition or spiritual practice      Importance and Influence:  Patient has spiritual/personal beliefs that influence decisions regarding their health  Family/Friends have spiritual/personal beliefs that influence decisions regarding the patient's health    Community:  Patient is connected with a spiritual community  Family/Friends are connected with a spiritual community:    Assessment and Plan of Care:     Patient Interventions include: Provided sacramental/Protestant ritual  Family/Friends Interventions include: Provided sacramental/Protestant ritual    Patient Plan of Care: Spiritual Care available upon further referral  Family/Friends Plan of Care: Spiritual Care available upon further referral    Electronically signed by Chaplain MARIO on 5/13/2025 at 2:22 PM     [facility]  Mr. Serrano was in bed. His wife and daughter were with him.  Mrs. Faust was thrilled that the  arrived with communion.  She reported thst Mr. Serrano is scheduled for surgery in the morning.  Prayer and communion offered to all three.  Assured of continued prayer and will plan to bring communion on Thursday.       Sr. LAUREN Candelaria, RN, ACSW, LCSW   Page:  [phone]

## 2025-05-13 NOTE — PROGRESS NOTES
Hospitalist Progress Note      NAME:  Aakash Faust   :  1945  MRM:  [id number]    Date/Time: 2025  8:09 AM           Assessment / Plan:     Aakash Faust is a 79 y.o. male with dementia, nephrolithiasis, prostate cancer, CAD, and HFrEF.  He was admitted on 2025 for evaluation/management of acute decompensation of chronic systolic heart failure and ALISHA.  He was found to have acute diverticulitis and Pseudomonas aeruginosa bloodstream infection.    #Acute decompensation of chronic systolic heart failure: Reason for admission.  Euvolemic.  TTE  showed LVEF 20 to 25% with severe global hypokinesis.  Of note, at home, he does not use diuretics daily as he has had issues with hypotension-- he ends up using them only a few times a week at most.  -Appreciate cardiology assistance  -Hold off on further diuresis for now  -GDMT: Continue empagliflozin which is reportedly new this admission.  Continue metoprolol (changed from carvedilol due to soft BPs).  Previously holding home losartan in setting of increased creatinine, now continuing to hold due to relative hypotension but could start sacubitril/valsartan when BPs are bit better    #Acute sigmoid diverticulitis with abscess: Initially seen on CT abdomen/pelvis .  Repeat CT on  showed significant increase in abscess size.  Abscess is not in a good position to be drained percutaneously per IR.  Pain improving.  -Surgery consulted.  Plan for operative intervention tomorrow  -N.p.o. after midnight  -Continue meropenem given MICs, duration TBD    #Pseudomonas aeruginosa bacteremia: 1 of 4 samples collected on  is growing pansensitive Pseudomonas aeruginosa.  Surveillance cultures from 5/10 exhibiting no growth so far.  Suspect a true infection, GI source.  -Continue antibiotics as above  -Appreciate ID assistance    #ALISHA: Resolved.  Secondary to bacteremia/diverticulitis.  -BMP tomorrow  -Holding home losartan for now (due to relative

## 2025-05-14 ENCOUNTER — ANESTHESIA (OUTPATIENT)
Facility: HOSPITAL | Age: 80
End: 2025-05-14
Payer: MEDICARE

## 2025-05-14 ENCOUNTER — ANESTHESIA EVENT (OUTPATIENT)
Facility: HOSPITAL | Age: 80
End: 2025-05-14
Payer: MEDICARE

## 2025-05-14 LAB
ABO + RH BLD: NORMAL
ANION GAP SERPL CALC-SCNC: 5 MMOL/L (ref 2–12)
BASOPHILS # BLD: 0.04 K/UL (ref 0–0.1)
BASOPHILS NFR BLD: 0.6 % (ref 0–1)
BLOOD GROUP ANTIBODIES SERPL: NORMAL
BUN SERPL-MCNC: 26 MG/DL (ref 6–20)
BUN/CREAT SERPL: 21 (ref 12–20)
CALCIUM SERPL-MCNC: 9.2 MG/DL (ref 8.5–10.1)
CHLORIDE SERPL-SCNC: 102 MMOL/L (ref 97–108)
CO2 SERPL-SCNC: 30 MMOL/L (ref 21–32)
CREAT SERPL-MCNC: 1.23 MG/DL (ref 0.7–1.3)
CRP SERPL-MCNC: 4.66 MG/DL (ref 0–0.3)
DIFFERENTIAL METHOD BLD: ABNORMAL
EOSINOPHIL # BLD: 0.1 K/UL (ref 0–0.4)
EOSINOPHIL NFR BLD: 1.4 % (ref 0–7)
ERYTHROCYTE [DISTWIDTH] IN BLOOD BY AUTOMATED COUNT: 14 % (ref 11.5–14.5)
GLUCOSE SERPL-MCNC: 106 MG/DL (ref 65–100)
HCT VFR BLD AUTO: 39.8 % (ref 36.6–50.3)
HGB BLD-MCNC: 12.5 G/DL (ref 12.1–17)
IMM GRANULOCYTES # BLD AUTO: 0.09 K/UL (ref 0–0.04)
IMM GRANULOCYTES NFR BLD AUTO: 1.3 % (ref 0–0.5)
LYMPHOCYTES # BLD: 1.41 K/UL (ref 0.8–3.5)
LYMPHOCYTES NFR BLD: 20.3 % (ref 12–49)
MCH RBC QN AUTO: 28 PG (ref 26–34)
MCHC RBC AUTO-ENTMCNC: 31.4 G/DL (ref 30–36.5)
MCV RBC AUTO: 89.2 FL (ref 80–99)
MONOCYTES # BLD: 0.54 K/UL (ref 0–1)
MONOCYTES NFR BLD: 7.8 % (ref 5–13)
NEUTS SEG # BLD: 4.76 K/UL (ref 1.8–8)
NEUTS SEG NFR BLD: 68.6 % (ref 32–75)
NRBC # BLD: 0 K/UL (ref 0–0.01)
NRBC BLD-RTO: 0 PER 100 WBC
PLATELET # BLD AUTO: 210 K/UL (ref 150–400)
PMV BLD AUTO: 10.6 FL (ref 8.9–12.9)
POTASSIUM SERPL-SCNC: 3.9 MMOL/L (ref 3.5–5.1)
RBC # BLD AUTO: 4.46 M/UL (ref 4.1–5.7)
SODIUM SERPL-SCNC: 137 MMOL/L (ref 136–145)
SPECIMEN EXP DATE BLD: NORMAL
WBC # BLD AUTO: 6.9 K/UL (ref 4.1–11.1)

## 2025-05-14 PROCEDURE — 7100000000 HC PACU RECOVERY - FIRST 15 MIN: Performed by: SURGERY

## 2025-05-14 PROCEDURE — 6360000002 HC RX W HCPCS: Performed by: NURSE ANESTHETIST, CERTIFIED REGISTERED

## 2025-05-14 PROCEDURE — 88307 TISSUE EXAM BY PATHOLOGIST: CPT

## 2025-05-14 PROCEDURE — 2500000003 HC RX 250 WO HCPCS: Performed by: FAMILY MEDICINE

## 2025-05-14 PROCEDURE — 99232 SBSQ HOSP IP/OBS MODERATE 35: CPT | Performed by: INTERNAL MEDICINE

## 2025-05-14 PROCEDURE — 2580000003 HC RX 258: Performed by: HOSPITALIST

## 2025-05-14 PROCEDURE — 99232 SBSQ HOSP IP/OBS MODERATE 35: CPT | Performed by: SURGERY

## 2025-05-14 PROCEDURE — 6360000002 HC RX W HCPCS: Performed by: HOSPITALIST

## 2025-05-14 PROCEDURE — 86900 BLOOD TYPING SEROLOGIC ABO: CPT

## 2025-05-14 PROCEDURE — 1100000000 HC RM PRIVATE

## 2025-05-14 PROCEDURE — 86850 RBC ANTIBODY SCREEN: CPT

## 2025-05-14 PROCEDURE — 2720000010 HC SURG SUPPLY STERILE: Performed by: SURGERY

## 2025-05-14 PROCEDURE — 87077 CULTURE AEROBIC IDENTIFY: CPT

## 2025-05-14 PROCEDURE — 44140 PARTIAL REMOVAL OF COLON: CPT | Performed by: SURGERY

## 2025-05-14 PROCEDURE — 3600000014 HC SURGERY LEVEL 4 ADDTL 15MIN: Performed by: SURGERY

## 2025-05-14 PROCEDURE — 87186 SC STD MICRODIL/AGAR DIL: CPT

## 2025-05-14 PROCEDURE — 6370000000 HC RX 637 (ALT 250 FOR IP): Performed by: SURGERY

## 2025-05-14 PROCEDURE — 3700000001 HC ADD 15 MINUTES (ANESTHESIA): Performed by: SURGERY

## 2025-05-14 PROCEDURE — 6370000000 HC RX 637 (ALT 250 FOR IP): Performed by: HOSPITALIST

## 2025-05-14 PROCEDURE — 2709999900 HC NON-CHARGEABLE SUPPLY: Performed by: SURGERY

## 2025-05-14 PROCEDURE — 44139 MOBILIZATION OF COLON: CPT | Performed by: SURGERY

## 2025-05-14 PROCEDURE — 2500000003 HC RX 250 WO HCPCS: Performed by: NURSE ANESTHETIST, CERTIFIED REGISTERED

## 2025-05-14 PROCEDURE — 85025 COMPLETE CBC W/AUTO DIFF WBC: CPT

## 2025-05-14 PROCEDURE — 86140 C-REACTIVE PROTEIN: CPT

## 2025-05-14 PROCEDURE — 80048 BASIC METABOLIC PNL TOTAL CA: CPT

## 2025-05-14 PROCEDURE — 36415 COLL VENOUS BLD VENIPUNCTURE: CPT

## 2025-05-14 PROCEDURE — 87070 CULTURE OTHR SPECIMN AEROBIC: CPT

## 2025-05-14 PROCEDURE — 3700000000 HC ANESTHESIA ATTENDED CARE: Performed by: SURGERY

## 2025-05-14 PROCEDURE — 6360000002 HC RX W HCPCS: Performed by: SURGERY

## 2025-05-14 PROCEDURE — 7100000001 HC PACU RECOVERY - ADDTL 15 MIN: Performed by: SURGERY

## 2025-05-14 PROCEDURE — 03HY32Z INSERTION OF MONITORING DEVICE INTO UPPER ARTERY, PERCUTANEOUS APPROACH: ICD-10-PCS | Performed by: NURSE ANESTHETIST, CERTIFIED REGISTERED

## 2025-05-14 PROCEDURE — 86901 BLOOD TYPING SEROLOGIC RH(D): CPT

## 2025-05-14 PROCEDURE — 87075 CULTR BACTERIA EXCEPT BLOOD: CPT

## 2025-05-14 PROCEDURE — 0DTN0ZZ RESECTION OF SIGMOID COLON, OPEN APPROACH: ICD-10-PCS | Performed by: SURGERY

## 2025-05-14 PROCEDURE — 6370000000 HC RX 637 (ALT 250 FOR IP): Performed by: FAMILY MEDICINE

## 2025-05-14 PROCEDURE — 3600000004 HC SURGERY LEVEL 4 BASE: Performed by: SURGERY

## 2025-05-14 PROCEDURE — 2580000003 HC RX 258: Performed by: NURSE ANESTHETIST, CERTIFIED REGISTERED

## 2025-05-14 PROCEDURE — 87205 SMEAR GRAM STAIN: CPT

## 2025-05-14 PROCEDURE — 2500000003 HC RX 250 WO HCPCS: Performed by: ANESTHESIOLOGY

## 2025-05-14 PROCEDURE — 2500000003 HC RX 250 WO HCPCS: Performed by: HOSPITALIST

## 2025-05-14 PROCEDURE — 6360000002 HC RX W HCPCS: Performed by: FAMILY MEDICINE

## 2025-05-14 DEVICE — GRAFT HUM TISS W2XL12CM WHL MEM AMNIO SHT NO ANG AMINOFIX: Type: IMPLANTABLE DEVICE | Site: ABDOMEN | Status: FUNCTIONAL

## 2025-05-14 RX ORDER — IPRATROPIUM BROMIDE AND ALBUTEROL SULFATE 2.5; .5 MG/3ML; MG/3ML
1 SOLUTION RESPIRATORY (INHALATION)
Status: DISCONTINUED | OUTPATIENT
Start: 2025-05-14 | End: 2025-05-14 | Stop reason: HOSPADM

## 2025-05-14 RX ORDER — SODIUM CHLORIDE, SODIUM LACTATE, POTASSIUM CHLORIDE, CALCIUM CHLORIDE 600; 310; 30; 20 MG/100ML; MG/100ML; MG/100ML; MG/100ML
INJECTION, SOLUTION INTRAVENOUS CONTINUOUS
Status: DISCONTINUED | OUTPATIENT
Start: 2025-05-14 | End: 2025-05-15

## 2025-05-14 RX ORDER — LABETALOL HYDROCHLORIDE 5 MG/ML
10 INJECTION, SOLUTION INTRAVENOUS
Status: DISCONTINUED | OUTPATIENT
Start: 2025-05-14 | End: 2025-05-14 | Stop reason: HOSPADM

## 2025-05-14 RX ORDER — ALBUTEROL SULFATE 0.83 MG/ML
2.5 SOLUTION RESPIRATORY (INHALATION)
Status: DISCONTINUED | OUTPATIENT
Start: 2025-05-14 | End: 2025-05-14 | Stop reason: HOSPADM

## 2025-05-14 RX ORDER — NALOXONE HYDROCHLORIDE 0.4 MG/ML
INJECTION, SOLUTION INTRAMUSCULAR; INTRAVENOUS; SUBCUTANEOUS PRN
Status: DISCONTINUED | OUTPATIENT
Start: 2025-05-14 | End: 2025-05-14 | Stop reason: HOSPADM

## 2025-05-14 RX ORDER — OXYCODONE HYDROCHLORIDE 5 MG/1
5 TABLET ORAL
Status: DISCONTINUED | OUTPATIENT
Start: 2025-05-14 | End: 2025-05-14 | Stop reason: HOSPADM

## 2025-05-14 RX ORDER — SODIUM CHLORIDE, SODIUM LACTATE, POTASSIUM CHLORIDE, CALCIUM CHLORIDE 600; 310; 30; 20 MG/100ML; MG/100ML; MG/100ML; MG/100ML
INJECTION, SOLUTION INTRAVENOUS
Status: DISCONTINUED | OUTPATIENT
Start: 2025-05-14 | End: 2025-05-14 | Stop reason: SDUPTHER

## 2025-05-14 RX ORDER — PROPOFOL 10 MG/ML
INJECTION, EMULSION INTRAVENOUS
Status: DISCONTINUED | OUTPATIENT
Start: 2025-05-14 | End: 2025-05-14 | Stop reason: SDUPTHER

## 2025-05-14 RX ORDER — DEXMEDETOMIDINE HYDROCHLORIDE 100 UG/ML
INJECTION, SOLUTION INTRAVENOUS
Status: DISCONTINUED | OUTPATIENT
Start: 2025-05-14 | End: 2025-05-14 | Stop reason: SDUPTHER

## 2025-05-14 RX ORDER — MEPERIDINE HYDROCHLORIDE 25 MG/ML
12.5 INJECTION INTRAMUSCULAR; INTRAVENOUS; SUBCUTANEOUS EVERY 5 MIN PRN
Status: DISCONTINUED | OUTPATIENT
Start: 2025-05-14 | End: 2025-05-14 | Stop reason: HOSPADM

## 2025-05-14 RX ORDER — SUCCINYLCHOLINE/SOD CL,ISO/PF 100 MG/5ML
SYRINGE (ML) INTRAVENOUS
Status: DISCONTINUED | OUTPATIENT
Start: 2025-05-14 | End: 2025-05-14 | Stop reason: SDUPTHER

## 2025-05-14 RX ORDER — SODIUM CHLORIDE, SODIUM LACTATE, POTASSIUM CHLORIDE, CALCIUM CHLORIDE 600; 310; 30; 20 MG/100ML; MG/100ML; MG/100ML; MG/100ML
INJECTION, SOLUTION INTRAVENOUS CONTINUOUS
Status: DISCONTINUED | OUTPATIENT
Start: 2025-05-14 | End: 2025-05-14 | Stop reason: HOSPADM

## 2025-05-14 RX ORDER — SODIUM CHLORIDE, SODIUM LACTATE, POTASSIUM CHLORIDE, CALCIUM CHLORIDE 600; 310; 30; 20 MG/100ML; MG/100ML; MG/100ML; MG/100ML
INJECTION, SOLUTION INTRAVENOUS CONTINUOUS
Status: DISCONTINUED | OUTPATIENT
Start: 2025-05-14 | End: 2025-05-14

## 2025-05-14 RX ORDER — NOREPINEPHRINE BITARTRATE 1 MG/ML
INJECTION, SOLUTION INTRAVENOUS
Status: DISCONTINUED | OUTPATIENT
Start: 2025-05-14 | End: 2025-05-14 | Stop reason: SDUPTHER

## 2025-05-14 RX ORDER — ROCURONIUM BROMIDE 10 MG/ML
INJECTION, SOLUTION INTRAVENOUS
Status: DISCONTINUED | OUTPATIENT
Start: 2025-05-14 | End: 2025-05-14 | Stop reason: SDUPTHER

## 2025-05-14 RX ORDER — LIDOCAINE HYDROCHLORIDE 10 MG/ML
1 INJECTION, SOLUTION EPIDURAL; INFILTRATION; INTRACAUDAL; PERINEURAL
Status: DISCONTINUED | OUTPATIENT
Start: 2025-05-14 | End: 2025-05-14 | Stop reason: HOSPADM

## 2025-05-14 RX ORDER — HYDROMORPHONE HYDROCHLORIDE 1 MG/ML
1 INJECTION, SOLUTION INTRAMUSCULAR; INTRAVENOUS; SUBCUTANEOUS EVERY 5 MIN PRN
Status: COMPLETED | OUTPATIENT
Start: 2025-05-14 | End: 2025-05-14

## 2025-05-14 RX ORDER — PHENYLEPHRINE HYDROCHLORIDE 10 MG/ML
INJECTION INTRAVENOUS
Status: DISCONTINUED | OUTPATIENT
Start: 2025-05-14 | End: 2025-05-14 | Stop reason: SDUPTHER

## 2025-05-14 RX ORDER — FENTANYL CITRATE 50 UG/ML
INJECTION, SOLUTION INTRAMUSCULAR; INTRAVENOUS
Status: DISCONTINUED | OUTPATIENT
Start: 2025-05-14 | End: 2025-05-14 | Stop reason: SDUPTHER

## 2025-05-14 RX ORDER — ETOMIDATE 2 MG/ML
INJECTION INTRAVENOUS
Status: DISCONTINUED | OUTPATIENT
Start: 2025-05-14 | End: 2025-05-14 | Stop reason: SDUPTHER

## 2025-05-14 RX ORDER — DROPERIDOL 2.5 MG/ML
0.62 INJECTION, SOLUTION INTRAMUSCULAR; INTRAVENOUS
Status: DISCONTINUED | OUTPATIENT
Start: 2025-05-14 | End: 2025-05-14 | Stop reason: HOSPADM

## 2025-05-14 RX ORDER — DIPHENHYDRAMINE HYDROCHLORIDE 50 MG/ML
12.5 INJECTION, SOLUTION INTRAMUSCULAR; INTRAVENOUS
Status: DISCONTINUED | OUTPATIENT
Start: 2025-05-14 | End: 2025-05-14 | Stop reason: HOSPADM

## 2025-05-14 RX ORDER — ACETAMINOPHEN 325 MG/1
650 TABLET ORAL EVERY 6 HOURS SCHEDULED
Status: DISCONTINUED | OUTPATIENT
Start: 2025-05-14 | End: 2025-05-19 | Stop reason: HOSPADM

## 2025-05-14 RX ADMIN — PHENYLEPHRINE HYDROCHLORIDE 100 MCG: 10 INJECTION INTRAVENOUS at 10:06

## 2025-05-14 RX ADMIN — DEXMEDETOMIDINE 16 MCG: 100 INJECTION, SOLUTION INTRAVENOUS at 13:32

## 2025-05-14 RX ADMIN — SUGAMMADEX 100 MG: 100 INJECTION, SOLUTION INTRAVENOUS at 13:08

## 2025-05-14 RX ADMIN — METOPROLOL TARTRATE 12.5 MG: 25 TABLET, FILM COATED ORAL at 08:06

## 2025-05-14 RX ADMIN — METOPROLOL TARTRATE 12.5 MG: 25 TABLET, FILM COATED ORAL at 20:48

## 2025-05-14 RX ADMIN — PROPOFOL 30 MG: 10 INJECTION, EMULSION INTRAVENOUS at 10:27

## 2025-05-14 RX ADMIN — MORPHINE SULFATE 2 MG: 2 INJECTION, SOLUTION INTRAMUSCULAR; INTRAVENOUS at 20:50

## 2025-05-14 RX ADMIN — OXYCODONE 5 MG: 5 TABLET ORAL at 16:46

## 2025-05-14 RX ADMIN — ASPIRIN 81 MG: 81 TABLET, COATED ORAL at 20:48

## 2025-05-14 RX ADMIN — ROCURONIUM BROMIDE 30 MG: 50 INJECTION INTRAVENOUS at 11:10

## 2025-05-14 RX ADMIN — PHENYLEPHRINE HYDROCHLORIDE 50 MCG/MIN: 10 INJECTION INTRAVENOUS at 10:58

## 2025-05-14 RX ADMIN — PHENYLEPHRINE HYDROCHLORIDE 200 MCG: 10 INJECTION INTRAVENOUS at 10:37

## 2025-05-14 RX ADMIN — MEROPENEM 1000 MG: 1 INJECTION INTRAVENOUS at 16:53

## 2025-05-14 RX ADMIN — SODIUM CHLORIDE, PRESERVATIVE FREE 10 ML: 5 INJECTION INTRAVENOUS at 20:51

## 2025-05-14 RX ADMIN — ETOMIDATE 3 MG: 2 INJECTION, SOLUTION INTRAVENOUS at 10:27

## 2025-05-14 RX ADMIN — ROCURONIUM BROMIDE 20 MG: 50 INJECTION INTRAVENOUS at 11:45

## 2025-05-14 RX ADMIN — ETOMIDATE 2 MG: 2 INJECTION, SOLUTION INTRAVENOUS at 10:06

## 2025-05-14 RX ADMIN — HYDROMORPHONE HYDROCHLORIDE 0.5 MG: 1 INJECTION, SOLUTION INTRAMUSCULAR; INTRAVENOUS; SUBCUTANEOUS at 18:55

## 2025-05-14 RX ADMIN — DONEPEZIL HYDROCHLORIDE 10 MG: 5 TABLET, FILM COATED ORAL at 20:48

## 2025-05-14 RX ADMIN — PROPOFOL 30 MG: 10 INJECTION, EMULSION INTRAVENOUS at 10:06

## 2025-05-14 RX ADMIN — SENNOSIDES 8.6 MG: 8.6 TABLET, FILM COATED ORAL at 20:49

## 2025-05-14 RX ADMIN — ETOMIDATE 2 MG: 2 INJECTION, SOLUTION INTRAVENOUS at 10:23

## 2025-05-14 RX ADMIN — HYDROMORPHONE HYDROCHLORIDE 1 MG: 1 INJECTION, SOLUTION INTRAMUSCULAR; INTRAVENOUS; SUBCUTANEOUS at 13:54

## 2025-05-14 RX ADMIN — MEROPENEM 1000 MG: 1 INJECTION INTRAVENOUS at 00:23

## 2025-05-14 RX ADMIN — SODIUM CHLORIDE, POTASSIUM CHLORIDE, SODIUM LACTATE AND CALCIUM CHLORIDE: 600; 310; 30; 20 INJECTION, SOLUTION INTRAVENOUS at 09:50

## 2025-05-14 RX ADMIN — ETOMIDATE 3 MG: 2 INJECTION, SOLUTION INTRAVENOUS at 10:29

## 2025-05-14 RX ADMIN — NOREPINEPHRINE BITARTRATE 8 MCG: 1 INJECTION INTRAVENOUS at 10:45

## 2025-05-14 RX ADMIN — HYDROMORPHONE HYDROCHLORIDE 1 MG: 1 INJECTION, SOLUTION INTRAMUSCULAR; INTRAVENOUS; SUBCUTANEOUS at 13:41

## 2025-05-14 RX ADMIN — Medication 100 MG: at 10:27

## 2025-05-14 RX ADMIN — FENTANYL CITRATE 50 MCG: 50 INJECTION, SOLUTION INTRAMUSCULAR; INTRAVENOUS at 09:59

## 2025-05-14 RX ADMIN — FENTANYL CITRATE 25 MCG: 50 INJECTION, SOLUTION INTRAMUSCULAR; INTRAVENOUS at 12:55

## 2025-05-14 RX ADMIN — MEROPENEM 1000 MG: 1 INJECTION INTRAVENOUS at 08:17

## 2025-05-14 RX ADMIN — ACETAMINOPHEN 650 MG: 325 TABLET ORAL at 16:46

## 2025-05-14 RX ADMIN — PROPOFOL 30 MG: 10 INJECTION, EMULSION INTRAVENOUS at 10:15

## 2025-05-14 RX ADMIN — ROCURONIUM BROMIDE 50 MG: 50 INJECTION INTRAVENOUS at 10:38

## 2025-05-14 RX ADMIN — FENTANYL CITRATE 25 MCG: 50 INJECTION, SOLUTION INTRAMUSCULAR; INTRAVENOUS at 09:50

## 2025-05-14 RX ADMIN — Medication 4.5 MG: at 20:47

## 2025-05-14 RX ADMIN — TAMSULOSIN HYDROCHLORIDE 0.4 MG: 0.4 CAPSULE ORAL at 20:49

## 2025-05-14 RX ADMIN — PHENYLEPHRINE HYDROCHLORIDE 200 MCG: 10 INJECTION INTRAVENOUS at 10:44

## 2025-05-14 RX ADMIN — PROPOFOL 30 MG: 10 INJECTION, EMULSION INTRAVENOUS at 10:11

## 2025-05-14 RX ADMIN — NOREPINEPHRINE BITARTRATE 8 MCG: 1 INJECTION INTRAVENOUS at 10:59

## 2025-05-14 RX ADMIN — ETOMIDATE 2 MG: 2 INJECTION, SOLUTION INTRAVENOUS at 10:15

## 2025-05-14 ASSESSMENT — PAIN SCALES - GENERAL
PAINLEVEL_OUTOF10: 4
PAINLEVEL_OUTOF10: 10
PAINLEVEL_OUTOF10: 7
PAINLEVEL_OUTOF10: 4
PAINLEVEL_OUTOF10: 10
PAINLEVEL_OUTOF10: 7
PAINLEVEL_OUTOF10: 9
PAINLEVEL_OUTOF10: 8

## 2025-05-14 ASSESSMENT — PAIN DESCRIPTION - LOCATION
LOCATION: ABDOMEN

## 2025-05-14 ASSESSMENT — PAIN DESCRIPTION - DESCRIPTORS
DESCRIPTORS: ACHING;SORE
DESCRIPTORS: ACHING
DESCRIPTORS: ACHING

## 2025-05-14 ASSESSMENT — PAIN - FUNCTIONAL ASSESSMENT
PAIN_FUNCTIONAL_ASSESSMENT: 0-10
PAIN_FUNCTIONAL_ASSESSMENT: ACTIVITIES ARE NOT PREVENTED

## 2025-05-14 ASSESSMENT — PAIN DESCRIPTION - ORIENTATION
ORIENTATION: MID;LOWER
ORIENTATION: INNER;MID
ORIENTATION: MID
ORIENTATION: MID

## 2025-05-14 ASSESSMENT — ENCOUNTER SYMPTOMS: SHORTNESS OF BREATH: 1

## 2025-05-14 ASSESSMENT — PAIN DESCRIPTION - PAIN TYPE: TYPE: SURGICAL PAIN

## 2025-05-14 NOTE — ANESTHESIA PRE PROCEDURE
and child/children.    Use of blood products discussed with patient and spouse whom consented to blood products.                  Wilson Espinal,    5/14/2025

## 2025-05-14 NOTE — PROGRESS NOTES
Hospitalist Progress Note      NAME:  Aakash Faust   :  1945  MRM:  [id number]    Date/Time: 2025  7:57 AM           Assessment / Plan:     Aakash Faust is a 79 y.o. male with dementia, nephrolithiasis, prostate cancer, CAD, and HFrEF.  He was admitted on 2025 for evaluation/management of acute decompensation of chronic systolic heart failure and ALISHA.  He was found to have acute diverticulitis and Pseudomonas aeruginosa bloodstream infection.    #Acute sigmoid diverticulitis with abscess: Initially seen on CT abdomen/pelvis .  Repeat CT on  showed significant increase in abscess size.  Status post sigmoid colectomy .  -Clear liquid diet for now, advance as tolerated  -Appreciate surgery assistance  -Continue meropenem given MICs, duration TBD  -Appreciate ID assistance    #Pseudomonas aeruginosa bacteremia: 1 of 4 samples collected on  is growing pansensitive Pseudomonas aeruginosa.  Surveillance cultures from 5/10 exhibiting no growth so far.  Suspect a true infection, GI source.  -Continue antibiotics as above  -Appreciate ID assistance    #Acute decompensation of chronic systolic heart failure: Reason for admission.  Euvolemic.  TTE  showed LVEF 20 to 25% with severe global hypokinesis.  Of note, at home, he does not use diuretics daily as he has had issues with hypotension-- he ends up using them only a few times a week at most.  -Appreciate cardiology assistance  -Hold off on further diuresis for now  -GDMT: Continue empagliflozin which is reportedly new this admission.  Continue metoprolol (changed from carvedilol due to soft BPs).  Previously holding home losartan in setting of increased creatinine, now continuing to hold due to relative hypotension but could start sacubitril/valsartan when BPs are bit better    #ALISHA: Resolved.  Secondary to bacteremia/diverticulitis.  -BMP tomorrow  -Holding home losartan for now (due to relative hypotension)    #Hyponatremia:

## 2025-05-14 NOTE — ANESTHESIA PROCEDURE NOTES
Arterial Line:    An arterial line was placed using ultrasound guidance, in the OR for the following indication(s): continuous blood pressure monitoring.    A 20 gauge (size) (length), Arrow (type) catheter was placed, into the left radial artery, secured by tape and Tegaderm.    Events:  patient tolerated procedure well with no complications.    Additional notes:  x3 attempts due to calcification of arteries5/14/2025 10:06 AM5/14/2025 10:25 AM  Anesthesiologist: Wilson Espinal DO  Resident/CRNA: Steven Ojeda APRN - CRNA  Performed: Resident/CRNA and Anesthesiologist   Preanesthetic Checklist  Completed: patient identified, site marked, risks and benefits discussed, surgical/procedural consents, pre-op evaluation, timeout performed, anesthesia consent given, oxygen available and monitors applied/VS acknowledged

## 2025-05-14 NOTE — OP NOTE
[facility]          [address] [place]  [address]                            OPERATIVE REPORT      PATIENT NAME: ROSS LINDSEY               : 1945  MED REC NO: [id number]                       ROOM: A369  ACCOUNT NO: [id number]                       ADMIT DATE: 2025  PROVIDER: Tayler Lainez MD    DATE OF SERVICE:  2025    PREOPERATIVE DIAGNOSES:  Perforated diverticulitis.    POSTOPERATIVE DIAGNOSES:  Perforated diverticulitis.    PROCEDURES PERFORMED:  Sigmoid colectomy.    SURGEON:  Tayler Lainez MD    ASSISTANT: Yoseph    ANESTHESIA:  General endotracheal anesthesia.    ESTIMATED BLOOD LOSS: 50 cc    SPECIMENS REMOVED:  Sigmoid colon.    INTRAOPERATIVE FINDINGS: Inflamed sigmoid colon with pelvic abscess     COMPLICATIONS:  None.    IMPLANTS: None    INDICATIONS:  The patient is an 79-year-old gentleman who presented to the hospital with CHF exacerbation.  He was subsequently found to have abdominal pain.  CT imaging showed a perforated diverticulitis.  This was treated initially with IV antibiotics.  However, repeat imaging showed the abscess to be much larger.  He is here today for sigmoid colectomy.    DESCRIPTION OF PROCEDURE:  After obtaining informed consent, the patient was brought to the operating room, was laid supine on the operating room table.  After general endotracheal anesthesia was induced, he was placed in lithotomy position, prepped and draped in standard fashion.  Surgical time-out was conducted.  He had been receiving ongoing antibiotics and did not require any additional dosing.  With time-out complete, a 10 blade scalpel used to make a midline incision.  Bovie electrocautery was used to divide the subcutaneous tissue down to the level of the fascia.  The fascia was sharply incised and the peritoneal cavity was entered.  A large wound protector was then placed.  With this in place, the patient was

## 2025-05-14 NOTE — ANESTHESIA POSTPROCEDURE EVALUATION
Department of Anesthesiology  Postprocedure Note    Patient: Aakash Faust  MRN: [id number]  YOB: 1945  Date of evaluation: 5/14/2025    Procedure Summary       Date: 05/14/25 Room / Location: [facility] MAIN OR F5 / [facility] MAIN OR    Anesthesia Start: 0950 Anesthesia Stop: 1334    Procedure: SIGMOID COLECTOMY (Abdomen) Diagnosis:       Perforated diverticulum of large intestine      (Perforated diverticulum of large intestine [K57.20])    Surgeons: Tayler Lainez MD Responsible Provider: Wilson Espinal DO    Anesthesia Type: general ASA Status: 4            Anesthesia Type: No value filed.    Pepe Phase I: Pepe Score: 8    Pepe Phase II:      Anesthesia Post Evaluation    Patient location during evaluation: PACU  Patient participation: complete - patient participated  Level of consciousness: sleepy but conscious and responsive to verbal stimuli  Pain scale: \"a little\"  Airway patency: patent  Nausea & Vomiting: no vomiting and no nausea  Cardiovascular status: hemodynamically stable  Respiratory status: acceptable and nasal cannula  Hydration status: stable  Comments: Patient seen and examined.  Ready for discharge from PACU.  Pain management: adequate    No notable events documented.

## 2025-05-14 NOTE — BRIEF OP NOTE
Brief Postoperative Note      Patient: Aakash Faust  YOB: 1945  MRN: [id number]    Date of Procedure: 5/14/2025    Pre-Op Diagnosis Codes:      * Perforated diverticulum of large intestine [K57.20]    Post-Op Diagnosis: Same       Procedure(s):  SIGMOID COLECTOMY    Surgeon(s):  Tayler Lainez MD    Assistant:  Surgical Assistant: Heike Minaya  First Assistant: Alejandro Hernández PA    Anesthesia: General    Estimated Blood Loss (mL): less than 100     Complications: None    Specimens:   ID Type Source Tests Collected by Time Destination   1 : 1) SIGMOID COLON Tissue Sigmoid Colon SURGICAL PATHOLOGY Tayler Lainez MD 5/14/2025 1155    A : pelvic abscess Swab Abdomen CULTURE, ANAEROBIC, CULTURE, WOUND (WITH GRAM STAIN) Tayler Lainez MD 5/14/2025 1117        Implants:  Implant Name Type Inv. Item Serial No.  Lot No. LRB No. Used Action   GRAFT HUM TISS [id number] WHL MEM AMNIO SHT NO ANG AMINOFIX - [id number]  GRAFT HUM TISS [id number] WHL MEM AMNIO SHT NO ANG AMINOFIX [id number] [facility] [id number] N/A 1 Implanted         Drains:   Closed/Suction Drain Left Abdomen Bulb (Active)       Urinary Catheter 05/14/25 2 Way (Active)       Findings:  Infection Present At Time Of Surgery (PATOS) (choose all levels that have infection present):  - Organ Space infection (below fascia) present as evidenced by abscess and pus  Other Findings: abscess in pelvis with perforation of the sigmoid colon  This procedure was not performed to treat colon cancer through resection    Electronically signed by Tayler Lainez MD on 5/14/2025 at 1:23 PM

## 2025-05-14 NOTE — PROGRESS NOTES
[facility] Progress Note  Yoni Dallas DO  [phone] Office  [phone] Fax    5/14/2025      Assessment & Plan:     Pseudomonal bacteremia.  Suspect due to diverticulitis.  Repeat blood cultures NGSF. Pseudomonas with elevated TOMÁS's to cefepime and piperacillin-tazobactam.  Continue meropenem.   Diverticulitis with with 2.2 x 1.0 cm right pelvic abscess.  Repeat CT showed significant increase in size of abscess despite appropriate antibiotics.  Patient was taken to the OR on 5/14 and underwent sigmoid colectomy for perforated diverticulitis.  Cultures are pending   ALISHA.  Resolved  TAVR/AICD.  History of aortic stenosis.  Follow blood cultures  Dementia  History of ciprofloxacin and sulfa allergies  Prolonged QTc.  QTc 5/6 was 515      PLEASE DO NOT DISCHARGE PATIENT UNTIL SURGICAL CULTURES ARE FINAL.    As long as surgical cultures from 5/14 did not reveal any organisms resistant to meropenem patient may be discharged on IV antibiotics as outlined below.       Post Discharge PICC and Antibiotic Orders    1.  Diagnosis: Diverticular abscess.  Pseudomonal bacteremia  2.  Antibiotic: Meropenem 1 g IV every 8 hours through 5/26/2025   3.  Routine PICC/ Sharad/ Portacath Care including PRN catheter flow management  4.  Weekly labs:   [x] CBC/diff/platelets   [x] BUN/Creatinine   [] CPK   [x] AST/TotalBilirubin/AlkalinePhosphatase   [x] CRP   []Trough Vancomycin level goal 15-20  5.  Fax lab to [phone]  Call Critical Lab Values to [phone]  6.  May send to IR for line evaluation or replacement -[phone]  7.  Home Health to pull PIC line at end of therapy or send to IR for Sharad removal  8.  Allergies:    Allergies   Allergen Reactions    Hydromorphone      Hypertension    Statins Myalgia     AND MEMORY LOSS     Ciprofloxacin Other (See Comments) and Rash     fever    Sulfa Antibiotics Other (See Comments) and Rash     Fever           Yoni Dallas DO        communicating results to the patient/family/caregiver  Care coordination (not separately reported) as noted above  Documenting clinical information in the electronic health records (e.g. problem list, visit note) on the day of the encounter       Yoni Dallas DO

## 2025-05-14 NOTE — PERIOP NOTE
TRANSFER - OUT REPORT:    Verbal report given to TREMAYNE Diaz on Aakash Faust  being transferred to [facility] for routine post-op       Report consisted of patient's Situation, Background, Assessment and   Recommendations(SBAR).     Information from the following report(s) Nurse Handoff Report and Surgery Report was reviewed with the receiving nurse.      Lines:   PICC 05/12/25 Left Basilic (Active)   Central Line Being Utilized Yes 05/14/25 0001   Criteria for Appropriate Use Long term IV/antibiotic administration 05/14/25 0001   Site Assessment Clean, dry & intact 05/14/25 0001   Phlebitis Assessment No symptoms 05/14/25 0001   Infiltration Assessment 0 05/14/25 0001   Extremity Circumference (cm) 30 cm 05/12/25 1445   External Catheter Length (cm) 0 cm 05/12/25 1445   Lumen #1 Color/Status Purple 05/14/25 0001   Line Care Cap changed 05/14/25 0001   Alcohol Cap Used Yes 05/14/25 0001   Date of Last Dressing Change 05/12/25 05/14/25 0001   Dressing Type Transparent w/CHG gel 05/14/25 0001   Dressing Status Clean, dry & intact 05/14/25 0001   Dressing Intervention New 05/12/25 1445       Peripheral IV 05/14/25 Distal;Posterior;Right Forearm (Active)       Arterial Line 05/14/25 Radial (Active)        Opportunity for questions and clarification was provided.      Patient transported with:  Registered Nurse

## 2025-05-14 NOTE — PROGRESS NOTES
General Surgery Daily Progress Note    Patient: Aakash Faust MRN: [id number]  SSN: [id number]    YOB: 1945  Age: 79 y.o.  Sex: male      Admit Date: 5/5/2025    POD * Day of Surgery *    Procedure: Procedure(s):  SIGMOID COLECTOMY    Subjective:   Patient  feels well  No overnight events    Current Facility-Administered Medications   Medication Dose Route Frequency    lidocaine PF 1 % injection 1 mL  1 mL IntraDERmal Once PRN    lactated ringers infusion   IntraVENous Continuous    albuterol (PROVENTIL) (2.5 MG/3ML) 0.083% nebulizer solution 2.5 mg  2.5 mg Nebulization Once PRN    sodium chloride flush 0.9 % injection 5-40 mL  5-40 mL IntraVENous 2 times per day    sodium chloride flush 0.9 % injection 5-40 mL  5-40 mL IntraVENous PRN    0.9 % sodium chloride infusion   IntraVENous PRN    lidocaine PF 1 % injection 50 mg  50 mg IntraDERmal Once    sennosides-docusate sodium (SENOKOT-S) 8.6-50 MG tablet 2 tablet  2 tablet Oral Daily PRN    metoprolol tartrate (LOPRESSOR) tablet 12.5 mg  12.5 mg Oral BID    meropenem (MERREM) 1,000 mg in sodium chloride 0.9 % 100 mL IVPB (addEASE)  1,000 mg IntraVENous Q8H    senna (SENOKOT) tablet 8.6 mg  1 tablet Oral Nightly    polyethylene glycol (GLYCOLAX) packet 17 g  17 g Oral Daily    oxyCODONE (ROXICODONE) immediate release tablet 5 mg  5 mg Oral Q4H PRN    morphine (PF) injection 2 mg  2 mg IntraVENous Q4H PRN    aspirin EC tablet 81 mg  81 mg Oral Once per day on Monday Wednesday Friday    donepezil (ARICEPT) tablet 10 mg  10 mg Oral Nightly    melatonin tablet 4.5 mg  4.5 mg Oral Nightly PRN    tamsulosin (FLOMAX) capsule 0.4 mg  0.4 mg Oral Nightly    sodium chloride flush 0.9 % injection 5-40 mL  5-40 mL IntraVENous 2 times per day    sodium chloride flush 0.9 % injection 5-40 mL  5-40 mL IntraVENous PRN    0.9 % sodium chloride infusion   IntraVENous PRN    potassium chloride (KLOR-CON) extended release tablet 40 mEq  40 mEq Oral PRN    Or    potassium

## 2025-05-15 ENCOUNTER — APPOINTMENT (OUTPATIENT)
Facility: HOSPITAL | Age: 80
DRG: 981 | End: 2025-05-15
Payer: MEDICARE

## 2025-05-15 LAB
ANION GAP SERPL CALC-SCNC: 5 MMOL/L (ref 2–12)
BASOPHILS # BLD: 0.03 K/UL (ref 0–0.1)
BASOPHILS NFR BLD: 0.3 % (ref 0–1)
BUN SERPL-MCNC: 22 MG/DL (ref 6–20)
BUN/CREAT SERPL: 22 (ref 12–20)
CALCIUM SERPL-MCNC: 8.8 MG/DL (ref 8.5–10.1)
CHLORIDE SERPL-SCNC: 105 MMOL/L (ref 97–108)
CO2 SERPL-SCNC: 28 MMOL/L (ref 21–32)
CREAT SERPL-MCNC: 0.99 MG/DL (ref 0.7–1.3)
DIFFERENTIAL METHOD BLD: ABNORMAL
EOSINOPHIL # BLD: 0.01 K/UL (ref 0–0.4)
EOSINOPHIL NFR BLD: 0.1 % (ref 0–7)
ERYTHROCYTE [DISTWIDTH] IN BLOOD BY AUTOMATED COUNT: 14.1 % (ref 11.5–14.5)
GLUCOSE SERPL-MCNC: 128 MG/DL (ref 65–100)
HCT VFR BLD AUTO: 38.2 % (ref 36.6–50.3)
HGB BLD-MCNC: 12.2 G/DL (ref 12.1–17)
IMM GRANULOCYTES # BLD AUTO: 0.07 K/UL (ref 0–0.04)
IMM GRANULOCYTES NFR BLD AUTO: 0.6 % (ref 0–0.5)
LYMPHOCYTES # BLD: 0.99 K/UL (ref 0.8–3.5)
LYMPHOCYTES NFR BLD: 9.1 % (ref 12–49)
MCH RBC QN AUTO: 28.2 PG (ref 26–34)
MCHC RBC AUTO-ENTMCNC: 31.9 G/DL (ref 30–36.5)
MCV RBC AUTO: 88.4 FL (ref 80–99)
MONOCYTES # BLD: 0.63 K/UL (ref 0–1)
MONOCYTES NFR BLD: 5.8 % (ref 5–13)
NEUTS SEG # BLD: 9.2 K/UL (ref 1.8–8)
NEUTS SEG NFR BLD: 84.1 % (ref 32–75)
NRBC # BLD: 0 K/UL (ref 0–0.01)
NRBC BLD-RTO: 0 PER 100 WBC
PLATELET # BLD AUTO: 214 K/UL (ref 150–400)
PMV BLD AUTO: 10.5 FL (ref 8.9–12.9)
POTASSIUM SERPL-SCNC: 4.7 MMOL/L (ref 3.5–5.1)
RBC # BLD AUTO: 4.32 M/UL (ref 4.1–5.7)
SODIUM SERPL-SCNC: 138 MMOL/L (ref 136–145)
WBC # BLD AUTO: 10.9 K/UL (ref 4.1–11.1)

## 2025-05-15 PROCEDURE — 99024 POSTOP FOLLOW-UP VISIT: CPT | Performed by: SURGERY

## 2025-05-15 PROCEDURE — 6370000000 HC RX 637 (ALT 250 FOR IP): Performed by: SURGERY

## 2025-05-15 PROCEDURE — 2500000003 HC RX 250 WO HCPCS: Performed by: FAMILY MEDICINE

## 2025-05-15 PROCEDURE — 71045 X-RAY EXAM CHEST 1 VIEW: CPT

## 2025-05-15 PROCEDURE — 6370000000 HC RX 637 (ALT 250 FOR IP): Performed by: FAMILY MEDICINE

## 2025-05-15 PROCEDURE — 2500000003 HC RX 250 WO HCPCS: Performed by: HOSPITALIST

## 2025-05-15 PROCEDURE — 97530 THERAPEUTIC ACTIVITIES: CPT

## 2025-05-15 PROCEDURE — 1100000000 HC RM PRIVATE

## 2025-05-15 PROCEDURE — 94640 AIRWAY INHALATION TREATMENT: CPT

## 2025-05-15 PROCEDURE — 2580000003 HC RX 258: Performed by: HOSPITALIST

## 2025-05-15 PROCEDURE — 36415 COLL VENOUS BLD VENIPUNCTURE: CPT

## 2025-05-15 PROCEDURE — 6360000002 HC RX W HCPCS: Performed by: SURGERY

## 2025-05-15 PROCEDURE — 94761 N-INVAS EAR/PLS OXIMETRY MLT: CPT

## 2025-05-15 PROCEDURE — 6370000000 HC RX 637 (ALT 250 FOR IP): Performed by: HOSPITALIST

## 2025-05-15 PROCEDURE — 6360000002 HC RX W HCPCS: Performed by: HOSPITALIST

## 2025-05-15 PROCEDURE — 99232 SBSQ HOSP IP/OBS MODERATE 35: CPT | Performed by: INTERNAL MEDICINE

## 2025-05-15 PROCEDURE — 99024 POSTOP FOLLOW-UP VISIT: CPT | Performed by: PHYSICIAN ASSISTANT

## 2025-05-15 PROCEDURE — 97116 GAIT TRAINING THERAPY: CPT

## 2025-05-15 PROCEDURE — 80048 BASIC METABOLIC PNL TOTAL CA: CPT

## 2025-05-15 PROCEDURE — 97164 PT RE-EVAL EST PLAN CARE: CPT

## 2025-05-15 PROCEDURE — 85025 COMPLETE CBC W/AUTO DIFF WBC: CPT

## 2025-05-15 PROCEDURE — 6370000000 HC RX 637 (ALT 250 FOR IP): Performed by: STUDENT IN AN ORGANIZED HEALTH CARE EDUCATION/TRAINING PROGRAM

## 2025-05-15 RX ORDER — FUROSEMIDE 40 MG/1
40 TABLET ORAL DAILY
Status: DISCONTINUED | OUTPATIENT
Start: 2025-05-15 | End: 2025-05-15

## 2025-05-15 RX ORDER — FUROSEMIDE 20 MG/1
20 TABLET ORAL DAILY
Status: DISCONTINUED | OUTPATIENT
Start: 2025-05-15 | End: 2025-05-15

## 2025-05-15 RX ADMIN — SENNOSIDES 8.6 MG: 8.6 TABLET, FILM COATED ORAL at 20:16

## 2025-05-15 RX ADMIN — ACETAMINOPHEN 650 MG: 325 TABLET ORAL at 00:56

## 2025-05-15 RX ADMIN — IPRATROPIUM BROMIDE AND ALBUTEROL SULFATE 1 DOSE: 2.5; .5 SOLUTION RESPIRATORY (INHALATION) at 01:50

## 2025-05-15 RX ADMIN — ENOXAPARIN SODIUM 40 MG: 100 INJECTION SUBCUTANEOUS at 07:44

## 2025-05-15 RX ADMIN — TAMSULOSIN HYDROCHLORIDE 0.4 MG: 0.4 CAPSULE ORAL at 20:16

## 2025-05-15 RX ADMIN — OXYCODONE 5 MG: 5 TABLET ORAL at 22:09

## 2025-05-15 RX ADMIN — METOPROLOL TARTRATE 12.5 MG: 25 TABLET, FILM COATED ORAL at 07:43

## 2025-05-15 RX ADMIN — SODIUM CHLORIDE, PRESERVATIVE FREE 10 ML: 5 INJECTION INTRAVENOUS at 07:50

## 2025-05-15 RX ADMIN — MEROPENEM 1000 MG: 1 INJECTION INTRAVENOUS at 16:04

## 2025-05-15 RX ADMIN — SODIUM CHLORIDE, PRESERVATIVE FREE 10 ML: 5 INJECTION INTRAVENOUS at 20:16

## 2025-05-15 RX ADMIN — MEROPENEM 1000 MG: 1 INJECTION INTRAVENOUS at 01:00

## 2025-05-15 RX ADMIN — OXYCODONE 5 MG: 5 TABLET ORAL at 13:41

## 2025-05-15 RX ADMIN — FUROSEMIDE 20 MG: 20 TABLET ORAL at 11:01

## 2025-05-15 RX ADMIN — MEROPENEM 1000 MG: 1 INJECTION INTRAVENOUS at 23:47

## 2025-05-15 RX ADMIN — OXYCODONE 5 MG: 5 TABLET ORAL at 00:57

## 2025-05-15 RX ADMIN — OXYCODONE 5 MG: 5 TABLET ORAL at 09:52

## 2025-05-15 RX ADMIN — OXYCODONE 5 MG: 5 TABLET ORAL at 17:39

## 2025-05-15 RX ADMIN — ACETAMINOPHEN 650 MG: 325 TABLET ORAL at 17:39

## 2025-05-15 RX ADMIN — IPRATROPIUM BROMIDE AND ALBUTEROL SULFATE 1 DOSE: 2.5; .5 SOLUTION RESPIRATORY (INHALATION) at 12:36

## 2025-05-15 RX ADMIN — SODIUM CHLORIDE, PRESERVATIVE FREE 10 ML: 5 INJECTION INTRAVENOUS at 00:56

## 2025-05-15 RX ADMIN — DONEPEZIL HYDROCHLORIDE 10 MG: 5 TABLET, FILM COATED ORAL at 20:16

## 2025-05-15 RX ADMIN — ACETAMINOPHEN 650 MG: 325 TABLET ORAL at 07:57

## 2025-05-15 RX ADMIN — Medication 4.5 MG: at 22:10

## 2025-05-15 RX ADMIN — IPRATROPIUM BROMIDE AND ALBUTEROL SULFATE 1 DOSE: 2.5; .5 SOLUTION RESPIRATORY (INHALATION) at 07:44

## 2025-05-15 RX ADMIN — ACETAMINOPHEN 650 MG: 325 TABLET ORAL at 23:46

## 2025-05-15 RX ADMIN — METOPROLOL TARTRATE 12.5 MG: 25 TABLET, FILM COATED ORAL at 20:16

## 2025-05-15 RX ADMIN — MEROPENEM 1000 MG: 1 INJECTION INTRAVENOUS at 07:49

## 2025-05-15 RX ADMIN — POLYETHYLENE GLYCOL 3350 17 G: 17 POWDER, FOR SOLUTION ORAL at 07:43

## 2025-05-15 ASSESSMENT — PAIN SCALES - GENERAL
PAINLEVEL_OUTOF10: 6
PAINLEVEL_OUTOF10: 7
PAINLEVEL_OUTOF10: 3
PAINLEVEL_OUTOF10: 5
PAINLEVEL_OUTOF10: 3
PAINLEVEL_OUTOF10: 2

## 2025-05-15 ASSESSMENT — PAIN DESCRIPTION - DESCRIPTORS
DESCRIPTORS: SORE
DESCRIPTORS: SORE

## 2025-05-15 ASSESSMENT — PAIN DESCRIPTION - LOCATION
LOCATION: ABDOMEN

## 2025-05-15 ASSESSMENT — PAIN DESCRIPTION - ORIENTATION
ORIENTATION: LOWER
ORIENTATION: MID

## 2025-05-15 ASSESSMENT — PAIN - FUNCTIONAL ASSESSMENT
PAIN_FUNCTIONAL_ASSESSMENT: ACTIVITIES ARE NOT PREVENTED
PAIN_FUNCTIONAL_ASSESSMENT: ACTIVITIES ARE NOT PREVENTED

## 2025-05-15 NOTE — SIGNIFICANT EVENT
Called to bedside for pt with complaints of feeling SOB, pt up in chair at bedside, post colon surgery today. Sats 88 on RA, pt denies any other complaints. NC placed on pt by RT- sats up to 100% with 2L NC    Alert & awake, NAD  CV: Hrr, s1,s2, strong radial pulses, skin warm, dry and pink  RESP: Lungs CTA, resp easy and non labored  GI: Ab soft , + BS  :Deferred  INTEG: Warm and dry    Will leave on NC at this time. Wean as tolerated

## 2025-05-15 NOTE — PROGRESS NOTES
Spiritual Health History and Assessment/Progress Note  [facility]    Rituals, Rites and Sacraments,  ,  ,      Name: Aakash Faust MRN: [id number]    Age: 79 y.o.     Sex: male   Language: English   Mosque: Christianity   Acute on chronic systolic CHF (congestive heart failure) ([facility])     Date: 5/15/2025            Total Time Calculated: 1265 min              Spiritual Assessment continued in [facility] A3 MULTI-SPECIALTY TELEMETRY        Referral/Consult From: Clergy/   Encounter Overview/Reason: Rituals, Rites and Sacraments  Service Provided For: Patient    Sharifa, Belief, Meaning:   Patient is connected with a sharifa tradition or spiritual practice  Family/Friends are connected with a sharifa tradition or spiritual practice      Importance and Influence:  Patient has spiritual/personal beliefs that influence decisions regarding their health  Family/Friends have spiritual/personal beliefs that influence decisions regarding the patient's health    Community:  Patient is connected with a spiritual community  Family/Friends are connected with a spiritual community:    Assessment and Plan of Care:     Patient Interventions include: Provided sacramental/Hindu ritual  Family/Friends Interventions include: Provided sacramental/Hindu ritual    Patient Plan of Care: Spiritual Care available upon further referral  Family/Friends Plan of Care: Spiritual Care available upon further referral    Electronically signed by Chaplain MARIO on 5/15/2025 at 3:11 PM     [facility]  Mr. Faust was sitting up in bed and on cell phone. His wife was with him. Prayer and communion offered. Assured of continued prayer for his well-being.     Sr. LAUREN Candelaria, RN, ACSW, LCSW   Page:  [phone]

## 2025-05-15 NOTE — PROGRESS NOTES
[facility] Progress Note  Yoni Dallas DO  [phone] Office  [phone] Fax    5/15/2025      Assessment & Plan:     Pseudomonal bacteremia.  Suspect due to diverticulitis.  Repeat blood cultures NGSF. Pseudomonas with elevated TOMÁS's to cefepime and piperacillin-tazobactam.  Continue meropenem.   Diverticulitis with with 2.2 x 1.0 cm right pelvic abscess.  Repeat CT showed significant increase in size of abscess despite appropriate antibiotics.  Patient was taken to the OR on 5/14 and underwent sigmoid colectomy for perforated diverticulitis.  Cultures are pending   ALISHA.  Resolved  TAVR/AICD.  History of aortic stenosis.  Follow blood cultures  Dementia  History of ciprofloxacin and sulfa allergies  Prolonged QTc.  QTc 5/6 was 515      PLEASE DO NOT DISCHARGE PATIENT UNTIL SURGICAL CULTURES ARE FINAL.    As long as surgical cultures from 5/14 did not reveal any organisms resistant to meropenem patient may be discharged on IV antibiotics as outlined below.       Post Discharge PICC and Antibiotic Orders    1.  Diagnosis: Diverticular abscess.  Pseudomonal bacteremia  2.  Antibiotic: Meropenem 1 g IV every 8 hours through 5/26/2025   3.  Routine PICC/ Sharad/ Portacath Care including PRN catheter flow management  4.  Weekly labs:   [x] CBC/diff/platelets   [x] BUN/Creatinine   [] CPK   [x] AST/TotalBilirubin/AlkalinePhosphatase   [x] CRP   []Trough Vancomycin level goal 15-20  5.  Fax lab to [phone]  Call Critical Lab Values to [phone]  6.  May send to IR for line evaluation or replacement [phone]  7.  Home Health to pull PIC line at end of therapy or send to IR for Sharad removal  8.  Allergies:    Allergies   Allergen Reactions    Hydromorphone      Hypertension    Statins Myalgia     AND MEMORY LOSS     Ciprofloxacin Other (See Comments) and Rash     fever    Sulfa Antibiotics Other (See Comments) and Rash     Fever           Yoni Dallas, DO       I will

## 2025-05-15 NOTE — PROGRESS NOTES
Chart reviewed.    Attempted follow up with Mr. Faust, however other provider was with patient and family.     Mr. Faust is Post-Op day 1 Sigmoid Colectomy. Per chart review, RR called overnight for O2 sat down to 88%, which has since resolved, o supplemental O2 needs.     Today he was noted to have mild to mod abdominal pain, which is not unexpected day 1 post surgery. Pain being treated w/ scheduled acetaminophen 650mg QID and prn oxy IR 5mg. Per chart review, pain seems to be well controlled with current regimen, not prohibiting him to getting OOB or working with PT.     PT did note orthostatic hypotension w/ symptoms of dizziness and nausea, which limited further ambulation. Etiology of hypotension unknown, however may need to avoid Oxy IR/Opioid use if it persists.     Recommend completing Durable DNR prior to discharge.    Please contact team via [employer] with any questions or concerns.    Thank you.

## 2025-05-15 NOTE — PROGRESS NOTES
Rapid Called at 0147     Responded to RRT at 0147 for Hypoxia. Pt experienced hypoxic episode while sitting in chair. Spo2 88%. 2L o2 NC placed. Spo2 100%. RT to give breathing treatment.     Provider at bedside: Yes  Interventions ordered: STAT Chest XR, Duoneb  Transfer to Higher Level of Care: na    Vitals:    05/15/25 0056   BP:    Pulse:    Resp: 18   Temp:    SpO2:         Rapid Ended at 0205  RRT RN assisted with transport to accepting unit No.    Junior Alegria RN

## 2025-05-15 NOTE — PROGRESS NOTES
General Surgery Daily Progress Note    Patient: Aakash Faust MRN: [id number]  SSN: [id number]    YOB: 1945  Age: 79 y.o.  Sex: male      Admit Date: 5/5/2025    POD 1 Day Post-Op    Procedure: Procedure(s):  SIGMOID COLECTOMY    Subjective:   Has a bit of abdominal pain, otherwise no complaints.  Rapid response called overnight for O2 sats at 88% while sleeping, otherwise breathing comfortably on room air now.  Has been out of bed several times.  Tolerating clears.  No nausea or vomiting.  No flatus or BM.    Current Facility-Administered Medications   Medication Dose Route Frequency    acetaminophen (TYLENOL) tablet 650 mg  650 mg Oral 4 times per day    sodium chloride flush 0.9 % injection 5-40 mL  5-40 mL IntraVENous 2 times per day    sodium chloride flush 0.9 % injection 5-40 mL  5-40 mL IntraVENous PRN    0.9 % sodium chloride infusion   IntraVENous PRN    lidocaine PF 1 % injection 50 mg  50 mg IntraDERmal Once    sennosides-docusate sodium (SENOKOT-S) 8.6-50 MG tablet 2 tablet  2 tablet Oral Daily PRN    metoprolol tartrate (LOPRESSOR) tablet 12.5 mg  12.5 mg Oral BID    meropenem (MERREM) 1,000 mg in sodium chloride 0.9 % 100 mL IVPB (addEASE)  1,000 mg IntraVENous Q8H    senna (SENOKOT) tablet 8.6 mg  1 tablet Oral Nightly    polyethylene glycol (GLYCOLAX) packet 17 g  17 g Oral Daily    oxyCODONE (ROXICODONE) immediate release tablet 5 mg  5 mg Oral Q4H PRN    morphine (PF) injection 2 mg  2 mg IntraVENous Q4H PRN    aspirin EC tablet 81 mg  81 mg Oral Once per day on Monday Wednesday Friday    donepezil (ARICEPT) tablet 10 mg  10 mg Oral Nightly    melatonin tablet 4.5 mg  4.5 mg Oral Nightly PRN    tamsulosin (FLOMAX) capsule 0.4 mg  0.4 mg Oral Nightly    sodium chloride flush 0.9 % injection 5-40 mL  5-40 mL IntraVENous 2 times per day    sodium chloride flush 0.9 % injection 5-40 mL  5-40 mL IntraVENous PRN    0.9 % sodium chloride infusion   IntraVENous PRN    potassium chloride  acute heart failure.  Will stop IV fluids as he is adequately taking p.o.  Encourage incentive spirometry.    Encourage out of bed activities/PT.    Will keep on clears for now and check later today for diet advancement.    Operative cultures obtained, will follow.  Continue meropenem now per ID.  Will hold discharge until surgical cultures return.    Continue DVT prophylaxis.    Rest of care per primary/consulting teams.            JUDY Cheatham [facility] Surgical Specialist at [employer]  [address] [address]   [phone]  P: [phone]

## 2025-05-15 NOTE — PLAN OF CARE
Problem: Physical Therapy - Adult  Goal: By Discharge: Performs mobility at highest level of function for planned discharge setting.  See evaluation for individualized goals.  Description: FUNCTIONAL STATUS PRIOR TO ADMISSION: Pt reports indep LOF with mobility, no use of device at baseline. Wife provides supervision/ cues with ADLs due to pt h/o dementia.     HOME SUPPORT PRIOR TO ADMISSION: The patient lived with wife who provides supervision as needed; son lives in home as well.    Physical Therapy Goals  Re-EVAL 5/15/2025  1.  Patient will move from supine to sit and sit to supine in bed with independence within 7 day(s).    2.  Patient will perform sit to stand with modified independence within 7 day(s).  3.  Patient will transfer from bed to chair and chair to bed with supervision/set-up using the least restrictive device within 7 day(s).  4.  Patient will ambulate with supervision/set-up for 150 feet with the least restrictive device within 7 day(s).   5.  Patient will ascend/descend 4 stairs with handrail(s) with contact guard assist within 7 day(s).   Outcome: Progressing  PHYSICAL THERAPY RE-EVALUATION    Patient: Aakash Faust (79 y.o. male)  Date: 5/15/2025  Primary Diagnosis: Shortness of breath [R06.02]  ALISHA (acute kidney injury) [N17.9]  Acute on chronic systolic CHF (congestive heart failure) (HCC) [I50.23]  Acute exacerbation of chronic heart failure (HCC) [I50.9]  Procedure(s) (LRB):  SIGMOID COLECTOMY (N/A) 1 Day Post-Op   Precautions: Restrictions/Precautions  Restrictions/Precautions: Fall Risk            ASSESSMENT : The patient is limited by decreased functional mobility, strength, activity tolerance, safety awareness, cognition, attention/concentration, balance following admit with acute on chronic CHF, diverticulitis with abscess and bacteremia s/p POD 1 sigmoid colectomy 5/14/25.  Overnight episode of desaturation with RR called and patient placed on supplemental 02 with improved oxygen  support needed for supervision with standing activities    Other factors to consider for discharge:  post surgical 5/14    IF patient discharges home will need the following DME: continuing to assess with progress       SUBJECTIVE:   Patient stated “I would like to go the chair.”    OBJECTIVE DATA SUMMARY:   Hospital course since last seen and reason for re-evaluation: Patient is POD 1 s/p sigmoid colectomy with RR 5/15 0147 due to hypoxia.    Past Medical History:   Diagnosis Date    Coronary atherosclerosis of unspecified type of vessel, native or graft 11/19/2010    Dementia (HCC)     MORE SHORT TERM    History of kidney stones     Mixed hyperlipidemia     Neuropathy of lower extremity     RT LEG AND SOMETIMES AFFECTS GAIT    Prostate cancer (HCC) 2016     Past Surgical History:   Procedure Laterality Date    CARDIAC CATHETERIZATION      CARDIAC PROCEDURE N/A 01/08/2024    Left and right heart cath / coronary angiography performed by Neymar Jerome MD at [facility] CARDIAC CATH LAB    CARDIAC PROCEDURE N/A 6/13/2024    . performed by Neymar Jerome MD at [facility] OPEN HEART    CARDIAC SURGERY N/A 6/13/2024    TRANSCATHETER AORTIC VALVE REPLACEMENT RIGHT TRANSFEMORAL APPROACH  26 S 3 performed by Raghav Dukes MD at [facility] OPEN HEART    CARPAL TUNNEL RELEASE  10/2019    bilateral    COLONOSCOPY      CORONARY ARTERY BYPASS GRAFT      EP DEVICE PROCEDURE N/A 03/08/2024    Insert ICD multi performed by Ernst Parmar MD at [facility] CARDIAC CATH LAB    OTHER SURGICAL HISTORY      implanted pain management device    SIGMOID COLECTOMY N/A 5/14/2025    SIGMOID COLECTOMY performed by Tayler Lainez MD at [facility] MAIN OR    SPINAL CORD STIMULATOR IMPLANT  2024    PAIN STIMULATOR IMPLANT - DOES NOT CURRENTLY WORK- PER WIFE NOT TURNED ON    TOTAL KNEE ARTHROPLASTY Left     TOTAL KNEE ARTHROPLASTY Right        Home Situation:  Social/Functional History  Lives With: Spouse  Type of Home: House  Home Layout: Two level, Able to Live on

## 2025-05-16 ENCOUNTER — APPOINTMENT (OUTPATIENT)
Facility: HOSPITAL | Age: 80
DRG: 981 | End: 2025-05-16
Payer: MEDICARE

## 2025-05-16 PROBLEM — B99.9 INTRA-ABDOMINAL INFECTION: Status: ACTIVE | Noted: 2025-05-16

## 2025-05-16 LAB
ALBUMIN SERPL-MCNC: 2.7 G/DL (ref 3.5–5)
ALBUMIN/GLOB SERPL: 1 (ref 1.1–2.2)
ALP SERPL-CCNC: 70 U/L (ref 45–117)
ALT SERPL-CCNC: 32 U/L (ref 12–78)
ANION GAP SERPL CALC-SCNC: 7 MMOL/L (ref 2–12)
AST SERPL-CCNC: 24 U/L (ref 15–37)
BACTERIA SPEC CULT: NORMAL
BASOPHILS # BLD: 0.02 K/UL (ref 0–0.1)
BASOPHILS NFR BLD: 0.2 % (ref 0–1)
BILIRUB SERPL-MCNC: 1 MG/DL (ref 0.2–1)
BUN SERPL-MCNC: 19 MG/DL (ref 6–20)
BUN/CREAT SERPL: 19 (ref 12–20)
CALCIUM SERPL-MCNC: 8.6 MG/DL (ref 8.5–10.1)
CHLORIDE SERPL-SCNC: 104 MMOL/L (ref 97–108)
CO2 SERPL-SCNC: 26 MMOL/L (ref 21–32)
CREAT SERPL-MCNC: 1.01 MG/DL (ref 0.7–1.3)
DIFFERENTIAL METHOD BLD: ABNORMAL
EOSINOPHIL # BLD: 0.13 K/UL (ref 0–0.4)
EOSINOPHIL NFR BLD: 1.4 % (ref 0–7)
ERYTHROCYTE [DISTWIDTH] IN BLOOD BY AUTOMATED COUNT: 14.2 % (ref 11.5–14.5)
GLOBULIN SER CALC-MCNC: 2.8 G/DL (ref 2–4)
GLUCOSE SERPL-MCNC: 99 MG/DL (ref 65–100)
HCT VFR BLD AUTO: 36.2 % (ref 36.6–50.3)
HGB BLD-MCNC: 11.5 G/DL (ref 12.1–17)
IMM GRANULOCYTES # BLD AUTO: 0.08 K/UL (ref 0–0.04)
IMM GRANULOCYTES NFR BLD AUTO: 0.9 % (ref 0–0.5)
LYMPHOCYTES # BLD: 1.19 K/UL (ref 0.8–3.5)
LYMPHOCYTES NFR BLD: 12.9 % (ref 12–49)
MAGNESIUM SERPL-MCNC: 2.2 MG/DL (ref 1.6–2.4)
MCH RBC QN AUTO: 28.3 PG (ref 26–34)
MCHC RBC AUTO-ENTMCNC: 31.8 G/DL (ref 30–36.5)
MCV RBC AUTO: 88.9 FL (ref 80–99)
MONOCYTES # BLD: 0.67 K/UL (ref 0–1)
MONOCYTES NFR BLD: 7.3 % (ref 5–13)
NEUTS SEG # BLD: 7.12 K/UL (ref 1.8–8)
NEUTS SEG NFR BLD: 77.3 % (ref 32–75)
NRBC # BLD: 0 K/UL (ref 0–0.01)
NRBC BLD-RTO: 0 PER 100 WBC
PHOSPHATE SERPL-MCNC: 2.7 MG/DL (ref 2.6–4.7)
PLATELET # BLD AUTO: 201 K/UL (ref 150–400)
PMV BLD AUTO: 10.6 FL (ref 8.9–12.9)
POTASSIUM SERPL-SCNC: 4.2 MMOL/L (ref 3.5–5.1)
PROT SERPL-MCNC: 5.5 G/DL (ref 6.4–8.2)
RBC # BLD AUTO: 4.07 M/UL (ref 4.1–5.7)
SERVICE CMNT-IMP: NORMAL
SODIUM SERPL-SCNC: 137 MMOL/L (ref 136–145)
WBC # BLD AUTO: 9.2 K/UL (ref 4.1–11.1)

## 2025-05-16 PROCEDURE — 99231 SBSQ HOSP IP/OBS SF/LOW 25: CPT | Performed by: NURSE PRACTITIONER

## 2025-05-16 PROCEDURE — 2580000003 HC RX 258: Performed by: PHYSICIAN ASSISTANT

## 2025-05-16 PROCEDURE — 6370000000 HC RX 637 (ALT 250 FOR IP): Performed by: STUDENT IN AN ORGANIZED HEALTH CARE EDUCATION/TRAINING PROGRAM

## 2025-05-16 PROCEDURE — 6370000000 HC RX 637 (ALT 250 FOR IP): Performed by: HOSPITALIST

## 2025-05-16 PROCEDURE — 6370000000 HC RX 637 (ALT 250 FOR IP): Performed by: FAMILY MEDICINE

## 2025-05-16 PROCEDURE — 99024 POSTOP FOLLOW-UP VISIT: CPT | Performed by: SURGERY

## 2025-05-16 PROCEDURE — 36415 COLL VENOUS BLD VENIPUNCTURE: CPT

## 2025-05-16 PROCEDURE — 6360000002 HC RX W HCPCS: Performed by: FAMILY MEDICINE

## 2025-05-16 PROCEDURE — 2500000003 HC RX 250 WO HCPCS: Performed by: FAMILY MEDICINE

## 2025-05-16 PROCEDURE — 6360000002 HC RX W HCPCS: Performed by: SURGERY

## 2025-05-16 PROCEDURE — 84100 ASSAY OF PHOSPHORUS: CPT

## 2025-05-16 PROCEDURE — 6370000000 HC RX 637 (ALT 250 FOR IP): Performed by: SURGERY

## 2025-05-16 PROCEDURE — 1100000000 HC RM PRIVATE

## 2025-05-16 PROCEDURE — 2500000003 HC RX 250 WO HCPCS: Performed by: HOSPITALIST

## 2025-05-16 PROCEDURE — 94761 N-INVAS EAR/PLS OXIMETRY MLT: CPT

## 2025-05-16 PROCEDURE — 83735 ASSAY OF MAGNESIUM: CPT

## 2025-05-16 PROCEDURE — 2580000003 HC RX 258: Performed by: HOSPITALIST

## 2025-05-16 PROCEDURE — 85025 COMPLETE CBC W/AUTO DIFF WBC: CPT

## 2025-05-16 PROCEDURE — 74018 RADEX ABDOMEN 1 VIEW: CPT

## 2025-05-16 PROCEDURE — 80053 COMPREHEN METABOLIC PANEL: CPT

## 2025-05-16 PROCEDURE — 6360000002 HC RX W HCPCS: Performed by: HOSPITALIST

## 2025-05-16 RX ORDER — BISACODYL 10 MG
10 SUPPOSITORY, RECTAL RECTAL DAILY PRN
Status: DISCONTINUED | OUTPATIENT
Start: 2025-05-16 | End: 2025-05-17

## 2025-05-16 RX ORDER — BISACODYL 10 MG
10 SUPPOSITORY, RECTAL RECTAL ONCE
Status: DISCONTINUED | OUTPATIENT
Start: 2025-05-16 | End: 2025-05-17

## 2025-05-16 RX ORDER — SODIUM CHLORIDE, SODIUM LACTATE, POTASSIUM CHLORIDE, CALCIUM CHLORIDE 600; 310; 30; 20 MG/100ML; MG/100ML; MG/100ML; MG/100ML
INJECTION, SOLUTION INTRAVENOUS CONTINUOUS
Status: DISCONTINUED | OUTPATIENT
Start: 2025-05-16 | End: 2025-05-17

## 2025-05-16 RX ADMIN — SODIUM CHLORIDE, PRESERVATIVE FREE 10 ML: 5 INJECTION INTRAVENOUS at 21:13

## 2025-05-16 RX ADMIN — MEROPENEM 1000 MG: 1 INJECTION INTRAVENOUS at 09:33

## 2025-05-16 RX ADMIN — ONDANSETRON 4 MG: 2 INJECTION, SOLUTION INTRAMUSCULAR; INTRAVENOUS at 17:24

## 2025-05-16 RX ADMIN — Medication 4.5 MG: at 21:18

## 2025-05-16 RX ADMIN — ACETAMINOPHEN 650 MG: 325 TABLET ORAL at 05:57

## 2025-05-16 RX ADMIN — ACETAMINOPHEN 650 MG: 325 TABLET ORAL at 17:24

## 2025-05-16 RX ADMIN — SENNOSIDES 8.6 MG: 8.6 TABLET, FILM COATED ORAL at 21:11

## 2025-05-16 RX ADMIN — TAMSULOSIN HYDROCHLORIDE 0.4 MG: 0.4 CAPSULE ORAL at 21:11

## 2025-05-16 RX ADMIN — ONDANSETRON 4 MG: 2 INJECTION, SOLUTION INTRAMUSCULAR; INTRAVENOUS at 10:59

## 2025-05-16 RX ADMIN — SODIUM CHLORIDE, PRESERVATIVE FREE 10 ML: 5 INJECTION INTRAVENOUS at 09:07

## 2025-05-16 RX ADMIN — ACETAMINOPHEN 650 MG: 325 TABLET ORAL at 23:47

## 2025-05-16 RX ADMIN — ASPIRIN 81 MG: 81 TABLET, COATED ORAL at 21:12

## 2025-05-16 RX ADMIN — OXYCODONE 5 MG: 5 TABLET ORAL at 21:18

## 2025-05-16 RX ADMIN — ACETAMINOPHEN 650 MG: 325 TABLET ORAL at 11:03

## 2025-05-16 RX ADMIN — MEROPENEM 1000 MG: 1 INJECTION INTRAVENOUS at 16:03

## 2025-05-16 RX ADMIN — ENOXAPARIN SODIUM 40 MG: 100 INJECTION SUBCUTANEOUS at 09:07

## 2025-05-16 RX ADMIN — ONDANSETRON 4 MG: 2 INJECTION, SOLUTION INTRAMUSCULAR; INTRAVENOUS at 23:46

## 2025-05-16 RX ADMIN — METOPROLOL TARTRATE 12.5 MG: 25 TABLET, FILM COATED ORAL at 09:06

## 2025-05-16 RX ADMIN — POLYETHYLENE GLYCOL 3350 17 G: 17 POWDER, FOR SOLUTION ORAL at 09:06

## 2025-05-16 RX ADMIN — OXYCODONE 5 MG: 5 TABLET ORAL at 05:57

## 2025-05-16 RX ADMIN — DONEPEZIL HYDROCHLORIDE 10 MG: 5 TABLET, FILM COATED ORAL at 21:11

## 2025-05-16 RX ADMIN — SODIUM CHLORIDE, SODIUM LACTATE, POTASSIUM CHLORIDE, AND CALCIUM CHLORIDE: .6; .31; .03; .02 INJECTION, SOLUTION INTRAVENOUS at 17:48

## 2025-05-16 RX ADMIN — SODIUM CHLORIDE, PRESERVATIVE FREE 10 ML: 5 INJECTION INTRAVENOUS at 21:14

## 2025-05-16 RX ADMIN — MEROPENEM 1000 MG: 1 INJECTION INTRAVENOUS at 23:50

## 2025-05-16 RX ADMIN — METOPROLOL TARTRATE 12.5 MG: 25 TABLET, FILM COATED ORAL at 21:12

## 2025-05-16 ASSESSMENT — PAIN DESCRIPTION - ORIENTATION: ORIENTATION: LOWER

## 2025-05-16 ASSESSMENT — PAIN SCALES - GENERAL
PAINLEVEL_OUTOF10: 6
PAINLEVEL_OUTOF10: 0
PAINLEVEL_OUTOF10: 8

## 2025-05-16 ASSESSMENT — PAIN DESCRIPTION - LOCATION
LOCATION: ABDOMEN

## 2025-05-16 ASSESSMENT — PAIN - FUNCTIONAL ASSESSMENT: PAIN_FUNCTIONAL_ASSESSMENT: ACTIVITIES ARE NOT PREVENTED

## 2025-05-16 ASSESSMENT — PAIN DESCRIPTION - DESCRIPTORS
DESCRIPTORS: SORE
DESCRIPTORS: ACHING
DESCRIPTORS: ACHING

## 2025-05-16 NOTE — PROGRESS NOTES
Hospitalist Progress Note      NAME:  Aakash Faust   :  1945  MRM:  [id number]    Date/Time: 2025  7:54 PM           Assessment / Plan:     Aakash Faust is a 79 y.o. male with dementia, nephrolithiasis, prostate cancer, CAD, and HFrEF.  He was admitted on 2025 for evaluation/management of acute decompensation of chronic systolic heart failure and ALISHA.  He was found to have acute diverticulitis and Pseudomonas aeruginosa bloodstream infection.    #Acute sigmoid diverticulitis with abscess: Initially seen on CT abdomen/pelvis .  Repeat CT on  showed significant increase in abscess size.  Status post sigmoid colectomy .  -Clear liquid diet for now, advance as tolerated  -Appreciate surgery assistance  -Continue meropenem given MICs, duration TBD  -Appreciate ID assistance: CONS/scant staph growing in  IntraOp culture do not discharge until final.  ID requested identification and sensitivity    # Postoperative ileus  - Patient needs to ambulate  - Able to tolerate clear liquid diet.  Continue this and advance as tolerated  - As needed suppository  - Trend KUB    #Pseudomonas aeruginosa bacteremia: 1 of 4 samples collected on  is growing pansensitive Pseudomonas aeruginosa.  Surveillance cultures from 5/10 exhibiting no growth so far.  Suspect a true infection, GI source.  -Continue antibiotics as above  -Appreciate ID assistance    #Acute decompensation of chronic systolic heart failure: Reason for admission.  Euvolemic.  TTE  showed LVEF 20 to 25% with severe global hypokinesis.  Of note, at home, he does not use diuretics daily as he has had issues with hypotension-- he ends up using them only a few times a week at most.  -Appreciate cardiology assistance  -Hold off on further diuresis for now  -GDMT: Continue metoprolol (changed from carvedilol due to soft BPs).  Previously holding home losartan in setting of increased creatinine, now continuing to hold due to relative  2.7   ALT  --   --  32     No components found for: \"GLPOC\"

## 2025-05-16 NOTE — PROGRESS NOTES
SURGERY PROGRESS NOTE      Admit Date: 2025    POD 2 Days Post-Op    Procedure: Procedure(s):  SIGMOID COLECTOMY      Subjective:   Feels better this am      Objective:     /82   Pulse 75   Temp 97.5 °F (36.4 °C) (Oral)   Resp 18   Ht 1.803 m (5' 11\")   Wt 79.4 kg (174 lb 15 oz)   SpO2 91%   BMI 24.40 kg/m²      Temp (24hrs), Av.7 °F (36.5 °C), Min:97.5 °F (36.4 °C), Max:98.2 °F (36.8 °C)      Physical Exam:     Abdomen:  Soft.   Appropriately tender, non-distended.  Incision C/D/I.        Assessment:     Principal Problem:    Acute on chronic systolic CHF (congestive heart failure) (HCC)  Active Problems:    Acute exacerbation of chronic heart failure (HCC)    Bacteremia due to Pseudomonas    Diverticulitis of large intestine with abscess without bleeding    H/O allergy to antibiotic agent    QT prolongation    AICD (automatic cardioverter/defibrillator) present    Palliative care encounter    Goals of care, counseling/discussion    Bacteremia    Perforated diverticulum of large intestine  Resolved Problems:    * No resolved hospital problems. *      Plan/Recommendations/Medical Decision Making:   Advance to regular diet as tolerates  Mobilize  Abx per ID

## 2025-05-16 NOTE — PROGRESS NOTES
Hospitalist Progress Note      NAME:  Aakash Faust   :  1945  MRM:  [id number]    Date/Time: 5/15/2025  9:15 PM           Assessment / Plan:     Aakash Faust is a 79 y.o. male with dementia, nephrolithiasis, prostate cancer, CAD, and HFrEF.  He was admitted on 2025 for evaluation/management of acute decompensation of chronic systolic heart failure and ALISHA.  He was found to have acute diverticulitis and Pseudomonas aeruginosa bloodstream infection.    #Acute sigmoid diverticulitis with abscess: Initially seen on CT abdomen/pelvis .  Repeat CT on  showed significant increase in abscess size.  Status post sigmoid colectomy .  -Clear liquid diet for now, advance as tolerated  -Appreciate surgery assistance  -Continue meropenem given MICs, duration TBD  -Appreciate ID assistance    #Pseudomonas aeruginosa bacteremia: 1 of 4 samples collected on  is growing pansensitive Pseudomonas aeruginosa.  Surveillance cultures from 5/10 exhibiting no growth so far.  Suspect a true infection, GI source.  -Continue antibiotics as above  -Appreciate ID assistance    #Acute decompensation of chronic systolic heart failure: Reason for admission.  Euvolemic.  TTE  showed LVEF 20 to 25% with severe global hypokinesis.  Of note, at home, he does not use diuretics daily as he has had issues with hypotension-- he ends up using them only a few times a week at most.  -Appreciate cardiology assistance  -Hold off on further diuresis for now  -GDMT: Continue metoprolol (changed from carvedilol due to soft BPs).  Previously holding home losartan in setting of increased creatinine, now continuing to hold due to relative hypotension but could start sacubitril/valsartan when BPs are bit better    #ALISHA: Resolved.  Secondary to bacteremia/diverticulitis.  -BMP daily  -Holding home losartan for now (due to relative hypotension)    #Hyponatremia: Resolved.    #NSVT: Occurred .  Pacemaker was interrogated per

## 2025-05-16 NOTE — PROGRESS NOTES
[facility] Progress Note    5/16/2025      Assessment & Plan:     Pseudomonal bacteremia.  Suspect due to diverticulitis.  Repeat blood cultures NGSF. Pseudomonas with elevated TOMÁS's to cefepime and piperacillin-tazobactam.  Continue meropenem.   Diverticulitis with with 2.2 x 1.0 cm right pelvic abscess.  Repeat CT showed significant increase in size of abscess despite appropriate antibiotics.  Patient was taken to the OR on 5/14 and underwent sigmoid colectomy for perforated diverticulitis.  Cultures are pending   ALISHA.  Resolved  TAVR/AICD.  History of aortic stenosis.  Follow blood cultures  Dementia  History of ciprofloxacin and sulfa allergies  Prolonged QTc.  QTc 5/6 was 515    5/16/2025  Afebrile, wbc 9.2  Intra-op cx (5/14) CoNS; requested for identification and sensitivity  Blood cx (5/6) pseudomonas aeruginosa, (5/10) no growth  Continue with IV merrem     PLEASE DO NOT DISCHARGE PATIENT UNTIL SURGICAL CULTURES ARE FINAL per Dr. Dallas.     Post Discharge PICC and Antibiotic Orders    1.  Diagnosis: Diverticular abscess.  Pseudomonal bacteremia  2.  Antibiotic: Meropenem 1 g IV every 8 hours through 5/26/2025   3.  Routine PICC/ Sharad/ Portacath Care including PRN catheter flow management  4.  Weekly labs:   [x] CBC/diff/platelets   [x] BUN/Creatinine   [] CPK   [x] AST/TotalBilirubin/AlkalinePhosphatase   [x] CRP   []Trough Vancomycin level goal 15-20  5.  Fax lab to [phone]  Call Critical Lab Values to [phone]  6.  May send to IR for line evaluation or replacement [phone]  7.  Home Health to pull PIC line at end of therapy or send to IR for Sharad removal  8.  Allergies:    Allergies   Allergen Reactions    Hydromorphone      Hypertension    Statins Myalgia     AND MEMORY LOSS     Ciprofloxacin Other (See Comments) and Rash     fever    Sulfa Antibiotics Other (See Comments) and Rash     Fever   Please contact Dr. Dallas with any questions or

## 2025-05-16 NOTE — CARE COORDINATION
5/16/2025 12:35 PM   Care Management Progress Note    Reason for Admission:   Shortness of breath [R06.02]  ALISHA (acute kidney injury) [N17.9]  Acute on chronic systolic CHF (congestive heart failure) (HCC) [I50.23]  Acute exacerbation of chronic heart failure (HCC) [I50.9]  Procedure(s) (LRB):  SIGMOID COLECTOMY (N/A)  2 Days Post-Op    Patient Admission Status: Inpatient  RUR: 12%  Hospitalization in the last 30 days (Readmission):  No        Transition of care plan:  Ongoing medical management  ID and General Surgery following   Home health arranged through [facility] and iv abx through "[facility]", awaiting final iv abx orders for home   Discharge plan communicated with patient and/or discharge caregiver: Yes by providers     Date 1st IMM letter given: 5/5/2025  Outpatient follow-up.  Transport at discharge: Pt's family

## 2025-05-17 ENCOUNTER — APPOINTMENT (OUTPATIENT)
Facility: HOSPITAL | Age: 80
DRG: 981 | End: 2025-05-17
Payer: MEDICARE

## 2025-05-17 LAB
ALBUMIN SERPL-MCNC: 2.5 G/DL (ref 3.5–5)
ALBUMIN/GLOB SERPL: 0.8 (ref 1.1–2.2)
ALP SERPL-CCNC: 64 U/L (ref 45–117)
ALT SERPL-CCNC: 32 U/L (ref 12–78)
ANION GAP SERPL CALC-SCNC: 5 MMOL/L (ref 2–12)
AST SERPL-CCNC: 23 U/L (ref 15–37)
BACTERIA SPEC CULT: ABNORMAL
BASOPHILS # BLD: 0.03 K/UL (ref 0–0.1)
BASOPHILS NFR BLD: 0.3 % (ref 0–1)
BILIRUB SERPL-MCNC: 1 MG/DL (ref 0.2–1)
BUN SERPL-MCNC: 21 MG/DL (ref 6–20)
BUN/CREAT SERPL: 21 (ref 12–20)
CALCIUM SERPL-MCNC: 8.6 MG/DL (ref 8.5–10.1)
CHLORIDE SERPL-SCNC: 106 MMOL/L (ref 97–108)
CO2 SERPL-SCNC: 29 MMOL/L (ref 21–32)
CREAT SERPL-MCNC: 0.99 MG/DL (ref 0.7–1.3)
DIFFERENTIAL METHOD BLD: ABNORMAL
EOSINOPHIL # BLD: 0.22 K/UL (ref 0–0.4)
EOSINOPHIL NFR BLD: 2.4 % (ref 0–7)
ERYTHROCYTE [DISTWIDTH] IN BLOOD BY AUTOMATED COUNT: 14.3 % (ref 11.5–14.5)
GLOBULIN SER CALC-MCNC: 3.2 G/DL (ref 2–4)
GLUCOSE SERPL-MCNC: 91 MG/DL (ref 65–100)
GRAM STN SPEC: ABNORMAL
GRAM STN SPEC: ABNORMAL
HCT VFR BLD AUTO: 36.8 % (ref 36.6–50.3)
HGB BLD-MCNC: 11.8 G/DL (ref 12.1–17)
IMM GRANULOCYTES # BLD AUTO: 0.05 K/UL (ref 0–0.04)
IMM GRANULOCYTES NFR BLD AUTO: 0.5 % (ref 0–0.5)
LYMPHOCYTES # BLD: 1.2 K/UL (ref 0.8–3.5)
LYMPHOCYTES NFR BLD: 13.2 % (ref 12–49)
MAGNESIUM SERPL-MCNC: 2.4 MG/DL (ref 1.6–2.4)
MCH RBC QN AUTO: 28.6 PG (ref 26–34)
MCHC RBC AUTO-ENTMCNC: 32.1 G/DL (ref 30–36.5)
MCV RBC AUTO: 89.1 FL (ref 80–99)
MONOCYTES # BLD: 0.67 K/UL (ref 0–1)
MONOCYTES NFR BLD: 7.3 % (ref 5–13)
NEUTS SEG # BLD: 6.95 K/UL (ref 1.8–8)
NEUTS SEG NFR BLD: 76.3 % (ref 32–75)
NRBC # BLD: 0 K/UL (ref 0–0.01)
NRBC BLD-RTO: 0 PER 100 WBC
PHOSPHATE SERPL-MCNC: 2.5 MG/DL (ref 2.6–4.7)
PLATELET # BLD AUTO: 224 K/UL (ref 150–400)
PMV BLD AUTO: 10 FL (ref 8.9–12.9)
POTASSIUM SERPL-SCNC: 4.1 MMOL/L (ref 3.5–5.1)
PROT SERPL-MCNC: 5.7 G/DL (ref 6.4–8.2)
RBC # BLD AUTO: 4.13 M/UL (ref 4.1–5.7)
SERVICE CMNT-IMP: ABNORMAL
SODIUM SERPL-SCNC: 140 MMOL/L (ref 136–145)
WBC # BLD AUTO: 9.1 K/UL (ref 4.1–11.1)

## 2025-05-17 PROCEDURE — 6370000000 HC RX 637 (ALT 250 FOR IP): Performed by: SURGERY

## 2025-05-17 PROCEDURE — 6370000000 HC RX 637 (ALT 250 FOR IP): Performed by: FAMILY MEDICINE

## 2025-05-17 PROCEDURE — 85025 COMPLETE CBC W/AUTO DIFF WBC: CPT

## 2025-05-17 PROCEDURE — 84100 ASSAY OF PHOSPHORUS: CPT

## 2025-05-17 PROCEDURE — 6360000002 HC RX W HCPCS: Performed by: SURGERY

## 2025-05-17 PROCEDURE — 2500000003 HC RX 250 WO HCPCS: Performed by: FAMILY MEDICINE

## 2025-05-17 PROCEDURE — 99024 POSTOP FOLLOW-UP VISIT: CPT | Performed by: SURGERY

## 2025-05-17 PROCEDURE — 2580000003 HC RX 258: Performed by: PHYSICIAN ASSISTANT

## 2025-05-17 PROCEDURE — 2500000003 HC RX 250 WO HCPCS: Performed by: HOSPITALIST

## 2025-05-17 PROCEDURE — 83735 ASSAY OF MAGNESIUM: CPT

## 2025-05-17 PROCEDURE — 6360000002 HC RX W HCPCS: Performed by: HOSPITALIST

## 2025-05-17 PROCEDURE — 6370000000 HC RX 637 (ALT 250 FOR IP): Performed by: HOSPITALIST

## 2025-05-17 PROCEDURE — 1100000000 HC RM PRIVATE

## 2025-05-17 PROCEDURE — 74018 RADEX ABDOMEN 1 VIEW: CPT

## 2025-05-17 PROCEDURE — 94761 N-INVAS EAR/PLS OXIMETRY MLT: CPT

## 2025-05-17 PROCEDURE — 36415 COLL VENOUS BLD VENIPUNCTURE: CPT

## 2025-05-17 PROCEDURE — 80053 COMPREHEN METABOLIC PANEL: CPT

## 2025-05-17 PROCEDURE — 2580000003 HC RX 258: Performed by: HOSPITALIST

## 2025-05-17 RX ORDER — BISACODYL 10 MG
10 SUPPOSITORY, RECTAL RECTAL ONCE
Status: DISCONTINUED | OUTPATIENT
Start: 2025-05-17 | End: 2025-05-17

## 2025-05-17 RX ADMIN — SODIUM CHLORIDE, PRESERVATIVE FREE 10 ML: 5 INJECTION INTRAVENOUS at 08:54

## 2025-05-17 RX ADMIN — ACETAMINOPHEN 650 MG: 325 TABLET ORAL at 05:46

## 2025-05-17 RX ADMIN — OXYCODONE 5 MG: 5 TABLET ORAL at 21:36

## 2025-05-17 RX ADMIN — ACETAMINOPHEN 650 MG: 325 TABLET ORAL at 23:50

## 2025-05-17 RX ADMIN — DONEPEZIL HYDROCHLORIDE 10 MG: 5 TABLET, FILM COATED ORAL at 21:33

## 2025-05-17 RX ADMIN — ENOXAPARIN SODIUM 40 MG: 100 INJECTION SUBCUTANEOUS at 08:53

## 2025-05-17 RX ADMIN — SODIUM CHLORIDE, PRESERVATIVE FREE 10 ML: 5 INJECTION INTRAVENOUS at 21:33

## 2025-05-17 RX ADMIN — ACETAMINOPHEN 650 MG: 325 TABLET ORAL at 17:07

## 2025-05-17 RX ADMIN — METOPROLOL TARTRATE 12.5 MG: 25 TABLET, FILM COATED ORAL at 21:33

## 2025-05-17 RX ADMIN — SODIUM CHLORIDE, PRESERVATIVE FREE 10 ML: 5 INJECTION INTRAVENOUS at 21:34

## 2025-05-17 RX ADMIN — MEROPENEM 1000 MG: 1 INJECTION INTRAVENOUS at 17:07

## 2025-05-17 RX ADMIN — SODIUM CHLORIDE, SODIUM LACTATE, POTASSIUM CHLORIDE, AND CALCIUM CHLORIDE: .6; .31; .03; .02 INJECTION, SOLUTION INTRAVENOUS at 05:48

## 2025-05-17 RX ADMIN — POLYETHYLENE GLYCOL 3350 17 G: 17 POWDER, FOR SOLUTION ORAL at 08:53

## 2025-05-17 RX ADMIN — Medication 4.5 MG: at 21:33

## 2025-05-17 RX ADMIN — ACETAMINOPHEN 650 MG: 325 TABLET ORAL at 11:03

## 2025-05-17 RX ADMIN — MEROPENEM 1000 MG: 1 INJECTION INTRAVENOUS at 23:54

## 2025-05-17 RX ADMIN — TAMSULOSIN HYDROCHLORIDE 0.4 MG: 0.4 CAPSULE ORAL at 21:33

## 2025-05-17 RX ADMIN — SODIUM CHLORIDE, PRESERVATIVE FREE 10 ML: 5 INJECTION INTRAVENOUS at 09:04

## 2025-05-17 RX ADMIN — METOPROLOL TARTRATE 12.5 MG: 25 TABLET, FILM COATED ORAL at 08:53

## 2025-05-17 RX ADMIN — MEROPENEM 1000 MG: 1 INJECTION INTRAVENOUS at 08:59

## 2025-05-17 ASSESSMENT — PAIN DESCRIPTION - DESCRIPTORS
DESCRIPTORS: ACHING;SORE
DESCRIPTORS: ACHING

## 2025-05-17 ASSESSMENT — PAIN SCALES - GENERAL
PAINLEVEL_OUTOF10: 4
PAINLEVEL_OUTOF10: 3
PAINLEVEL_OUTOF10: 3
PAINLEVEL_OUTOF10: 2
PAINLEVEL_OUTOF10: 3

## 2025-05-17 ASSESSMENT — PAIN DESCRIPTION - LOCATION
LOCATION: ABDOMEN

## 2025-05-17 NOTE — PROGRESS NOTES
SURGERY PROGRESS NOTE      Admit Date: 2025    POD 3 Days Post-Op    Procedure: Procedure(s):  SIGMOID COLECTOMY      Subjective:   Feels better today  +flatus      Objective:     /68   Pulse 67   Temp 97.5 °F (36.4 °C) (Oral)   Resp 17   Ht 1.803 m (5' 11\")   Wt 78.5 kg (173 lb)   SpO2 93%   BMI 24.13 kg/m²      Temp (24hrs), Av.7 °F (36.5 °C), Min:97.3 °F (36.3 °C), Max:98.2 °F (36.8 °C)      Physical Exam:     Abdomen:  Soft.  Non-tender, non-distended.  Incision C/D/I. JENNIFER serous        Assessment:     Principal Problem:    Acute on chronic systolic CHF (congestive heart failure) (HCC)  Active Problems:    Acute exacerbation of chronic heart failure (HCC)    Bacteremia due to Pseudomonas    Diverticulitis of large intestine with abscess without bleeding    H/O allergy to antibiotic agent    QT prolongation    AICD (automatic cardioverter/defibrillator) present    Palliative care encounter    Goals of care, counseling/discussion    Bacteremia    Perforated diverticulum of large intestine    Intra-abdominal infection  Resolved Problems:    * No resolved hospital problems. *      Plan/Recommendations/Medical Decision Making:   Diet as tolerates  Mobilize  Continue IV abx per ID

## 2025-05-17 NOTE — CARE COORDINATION
Care Management Progress Note    Reason for Admission:   Shortness of breath [R06.02]  ALISHA (acute kidney injury) [N17.9]  Acute on chronic systolic CHF (congestive heart failure) (HCC) [I50.23]  Acute exacerbation of chronic heart failure (HCC) [I50.9]  Procedure(s) (LRB):  SIGMOID COLECTOMY (N/A)  3 Days Post-Op    Patient Admission Status: Inpatient  RUR: 12%  Hospitalization in the last 30 days (Readmission):  No        Transition of care plan:  Ongoing medical management, reviewed with Dr. Sanchez.   Updated orders from ID sent to [facility], educated wife that the co will call her to coordinate training.   [facility] to follow  Discharge plan communicated with patient and/or discharge caregiver: Yes    Date 1st IMM letter given: 5/5  Outpatient follow-up.  Transport at discharge: family

## 2025-05-18 LAB
ALBUMIN SERPL-MCNC: 2.3 G/DL (ref 3.5–5)
ALBUMIN/GLOB SERPL: 0.8 (ref 1.1–2.2)
ALP SERPL-CCNC: 61 U/L (ref 45–117)
ALT SERPL-CCNC: 28 U/L (ref 12–78)
ANION GAP SERPL CALC-SCNC: 4 MMOL/L (ref 2–12)
AST SERPL-CCNC: 23 U/L (ref 15–37)
BASOPHILS # BLD: 0.02 K/UL (ref 0–0.1)
BASOPHILS NFR BLD: 0.3 % (ref 0–1)
BILIRUB SERPL-MCNC: 0.8 MG/DL (ref 0.2–1)
BUN SERPL-MCNC: 22 MG/DL (ref 6–20)
BUN/CREAT SERPL: 21 (ref 12–20)
CALCIUM SERPL-MCNC: 8.6 MG/DL (ref 8.5–10.1)
CHLORIDE SERPL-SCNC: 107 MMOL/L (ref 97–108)
CO2 SERPL-SCNC: 30 MMOL/L (ref 21–32)
CREAT SERPL-MCNC: 1.05 MG/DL (ref 0.7–1.3)
DIFFERENTIAL METHOD BLD: ABNORMAL
EOSINOPHIL # BLD: 0.22 K/UL (ref 0–0.4)
EOSINOPHIL NFR BLD: 3.6 % (ref 0–7)
ERYTHROCYTE [DISTWIDTH] IN BLOOD BY AUTOMATED COUNT: 14.4 % (ref 11.5–14.5)
GLOBULIN SER CALC-MCNC: 3 G/DL (ref 2–4)
GLUCOSE SERPL-MCNC: 98 MG/DL (ref 65–100)
HCT VFR BLD AUTO: 34.4 % (ref 36.6–50.3)
HGB BLD-MCNC: 10.8 G/DL (ref 12.1–17)
IMM GRANULOCYTES # BLD AUTO: 0.05 K/UL (ref 0–0.04)
IMM GRANULOCYTES NFR BLD AUTO: 0.8 % (ref 0–0.5)
LYMPHOCYTES # BLD: 1.38 K/UL (ref 0.8–3.5)
LYMPHOCYTES NFR BLD: 22.6 % (ref 12–49)
MAGNESIUM SERPL-MCNC: 2.4 MG/DL (ref 1.6–2.4)
MCH RBC QN AUTO: 27.9 PG (ref 26–34)
MCHC RBC AUTO-ENTMCNC: 31.4 G/DL (ref 30–36.5)
MCV RBC AUTO: 88.9 FL (ref 80–99)
MONOCYTES # BLD: 0.47 K/UL (ref 0–1)
MONOCYTES NFR BLD: 7.7 % (ref 5–13)
NEUTS SEG # BLD: 3.97 K/UL (ref 1.8–8)
NEUTS SEG NFR BLD: 65 % (ref 32–75)
NRBC # BLD: 0 K/UL (ref 0–0.01)
NRBC BLD-RTO: 0 PER 100 WBC
PHOSPHATE SERPL-MCNC: 2.5 MG/DL (ref 2.6–4.7)
PLATELET # BLD AUTO: 219 K/UL (ref 150–400)
PMV BLD AUTO: 10 FL (ref 8.9–12.9)
POTASSIUM SERPL-SCNC: 3.9 MMOL/L (ref 3.5–5.1)
PROT SERPL-MCNC: 5.3 G/DL (ref 6.4–8.2)
RBC # BLD AUTO: 3.87 M/UL (ref 4.1–5.7)
SODIUM SERPL-SCNC: 141 MMOL/L (ref 136–145)
WBC # BLD AUTO: 6.1 K/UL (ref 4.1–11.1)

## 2025-05-18 PROCEDURE — 6370000000 HC RX 637 (ALT 250 FOR IP): Performed by: HOSPITALIST

## 2025-05-18 PROCEDURE — 6360000002 HC RX W HCPCS: Performed by: HOSPITALIST

## 2025-05-18 PROCEDURE — 80053 COMPREHEN METABOLIC PANEL: CPT

## 2025-05-18 PROCEDURE — 94761 N-INVAS EAR/PLS OXIMETRY MLT: CPT

## 2025-05-18 PROCEDURE — 6360000002 HC RX W HCPCS: Performed by: FAMILY MEDICINE

## 2025-05-18 PROCEDURE — 6360000002 HC RX W HCPCS: Performed by: SURGERY

## 2025-05-18 PROCEDURE — 2500000003 HC RX 250 WO HCPCS: Performed by: HOSPITALIST

## 2025-05-18 PROCEDURE — 1100000000 HC RM PRIVATE

## 2025-05-18 PROCEDURE — 99024 POSTOP FOLLOW-UP VISIT: CPT | Performed by: SURGERY

## 2025-05-18 PROCEDURE — 2580000003 HC RX 258: Performed by: HOSPITALIST

## 2025-05-18 PROCEDURE — 36415 COLL VENOUS BLD VENIPUNCTURE: CPT

## 2025-05-18 PROCEDURE — 83735 ASSAY OF MAGNESIUM: CPT

## 2025-05-18 PROCEDURE — 85025 COMPLETE CBC W/AUTO DIFF WBC: CPT

## 2025-05-18 PROCEDURE — 6370000000 HC RX 637 (ALT 250 FOR IP): Performed by: SURGERY

## 2025-05-18 PROCEDURE — 6370000000 HC RX 637 (ALT 250 FOR IP): Performed by: FAMILY MEDICINE

## 2025-05-18 PROCEDURE — 2500000003 HC RX 250 WO HCPCS: Performed by: FAMILY MEDICINE

## 2025-05-18 PROCEDURE — 84100 ASSAY OF PHOSPHORUS: CPT

## 2025-05-18 RX ORDER — SIMETHICONE 80 MG
80 TABLET,CHEWABLE ORAL EVERY 6 HOURS PRN
Status: DISCONTINUED | OUTPATIENT
Start: 2025-05-18 | End: 2025-05-19 | Stop reason: HOSPADM

## 2025-05-18 RX ADMIN — ONDANSETRON 4 MG: 2 INJECTION, SOLUTION INTRAMUSCULAR; INTRAVENOUS at 15:20

## 2025-05-18 RX ADMIN — Medication 4.5 MG: at 21:08

## 2025-05-18 RX ADMIN — TAMSULOSIN HYDROCHLORIDE 0.4 MG: 0.4 CAPSULE ORAL at 21:10

## 2025-05-18 RX ADMIN — SODIUM CHLORIDE, PRESERVATIVE FREE 10 ML: 5 INJECTION INTRAVENOUS at 21:10

## 2025-05-18 RX ADMIN — OXYCODONE 5 MG: 5 TABLET ORAL at 21:09

## 2025-05-18 RX ADMIN — METOPROLOL TARTRATE 12.5 MG: 25 TABLET, FILM COATED ORAL at 08:57

## 2025-05-18 RX ADMIN — SODIUM CHLORIDE, PRESERVATIVE FREE 10 ML: 5 INJECTION INTRAVENOUS at 21:09

## 2025-05-18 RX ADMIN — ENOXAPARIN SODIUM 40 MG: 100 INJECTION SUBCUTANEOUS at 08:57

## 2025-05-18 RX ADMIN — ACETAMINOPHEN 650 MG: 325 TABLET ORAL at 06:03

## 2025-05-18 RX ADMIN — ACETAMINOPHEN 650 MG: 325 TABLET ORAL at 14:24

## 2025-05-18 RX ADMIN — MEROPENEM 1000 MG: 1 INJECTION INTRAVENOUS at 17:30

## 2025-05-18 RX ADMIN — MEROPENEM 1000 MG: 1 INJECTION INTRAVENOUS at 08:56

## 2025-05-18 RX ADMIN — SIMETHICONE 80 MG: 80 TABLET, CHEWABLE ORAL at 14:24

## 2025-05-18 RX ADMIN — DONEPEZIL HYDROCHLORIDE 10 MG: 5 TABLET, FILM COATED ORAL at 21:10

## 2025-05-18 RX ADMIN — ACETAMINOPHEN 650 MG: 325 TABLET ORAL at 17:31

## 2025-05-18 ASSESSMENT — PAIN DESCRIPTION - DESCRIPTORS
DESCRIPTORS: ACHING
DESCRIPTORS: ACHING

## 2025-05-18 ASSESSMENT — PAIN SCALES - GENERAL
PAINLEVEL_OUTOF10: 2
PAINLEVEL_OUTOF10: 3

## 2025-05-18 ASSESSMENT — PAIN DESCRIPTION - LOCATION
LOCATION: ABDOMEN
LOCATION: ABDOMEN

## 2025-05-18 NOTE — PROGRESS NOTES
SURGERY PROGRESS NOTE      Admit Date: 2025    POD 4 Days Post-Op    Procedure: Procedure(s):  SIGMOID COLECTOMY      Subjective:   Feels tired but otherwise ok  Tolerating diet  +BM      Objective:     /67   Pulse 65   Temp 97.3 °F (36.3 °C) (Oral)   Resp 16   Ht 1.803 m (5' 11\")   Wt 79.7 kg (175 lb 9.6 oz)   SpO2 93%   BMI 24.49 kg/m²      Temp (24hrs), Av.6 °F (36.4 °C), Min:97.2 °F (36.2 °C), Max:97.9 °F (36.6 °C)      Physical Exam:     Abdomen:  Soft.  Non-tender, non-distended.  Incision C/D/I. JENNIFER serous        Assessment:     Principal Problem:    Acute on chronic systolic CHF (congestive heart failure) ([facility])  Active Problems:    Acute exacerbation of chronic heart failure (HCC)    Bacteremia due to Pseudomonas    Diverticulitis of large intestine with abscess without bleeding    H/O allergy to antibiotic agent    QT prolongation    AICD (automatic cardioverter/defibrillator) present    Palliative care encounter    Goals of care, counseling/discussion    Bacteremia    Perforated diverticulum of large intestine    Intra-abdominal infection  Resolved Problems:    * No resolved hospital problems. *      Plan/Recommendations/Medical Decision Making:     Leave drain today - will assess volume tomorrow to determine if can come out prior to d/c   Ok for d/c home from surgical standpoint once home abx set up  Diet as tolerates

## 2025-05-18 NOTE — PROGRESS NOTES
Hospitalist Progress Note      NAME:  Aakash Faust   :  1945  MRM:  [id number]    Date/Time: 2025  2:54 PM           Assessment / Plan:     Aakash Faust is a 79 y.o. male with dementia, nephrolithiasis, prostate cancer, CAD, and HFrEF.  He was admitted on 2025 for evaluation/management of acute decompensation of chronic systolic heart failure and ALISHA.  He was found to have acute diverticulitis and Pseudomonas aeruginosa bloodstream infection.    #Acute sigmoid diverticulitis with abscess: Initially seen on CT abdomen/pelvis .  Repeat CT on  showed significant increase in abscess size.  Status post sigmoid colectomy .  -Clear liquid diet for now, advance as tolerated  -Appreciate surgery assistance  -Continue meropenem given MICs, duration TBD  -Appreciate ID assistance: Intraoperative wound culture only growing Staph epidermidis, and ID believes no need to change add additional abx to meropenem.   - Discharge pending antibiotic teaching and home order delivery, ideally .  Appreciate case management    # Postoperative ileus, resolved  - Continue regular diet    #Pseudomonas aeruginosa bacteremia: 1 of 4 samples collected on  is growing pansensitive Pseudomonas aeruginosa.  Surveillance cultures from 5/10 exhibiting no growth so far.  Suspect a true infection, GI source.  -Continue antibiotics as above  -Appreciate ID assistance    #Acute decompensation of chronic systolic heart failure: Reason for admission.  Euvolemic.  TTE  showed LVEF 20 to 25% with severe global hypokinesis.  Of note, at home, he does not use diuretics daily as he has had issues with hypotension-- he ends up using them only a few times a week at most.  -Appreciate cardiology assistance  -Hold off on further diuresis for now  -GDMT: Continue metoprolol (changed from carvedilol due to soft BPs).  Previously holding home losartan in setting of increased creatinine, now continuing to hold due to relative

## 2025-05-18 NOTE — PROGRESS NOTES
Hospitalist Progress Note      NAME:  Aakash Faust   :  1945  MRM:  [id number]    Date/Time: 2025  8:12 PM           Assessment / Plan:     Aakash Faust is a 79 y.o. male with dementia, nephrolithiasis, prostate cancer, CAD, and HFrEF.  He was admitted on 2025 for evaluation/management of acute decompensation of chronic systolic heart failure and ALISHA.  He was found to have acute diverticulitis and Pseudomonas aeruginosa bloodstream infection.    #Acute sigmoid diverticulitis with abscess: Initially seen on CT abdomen/pelvis .  Repeat CT on  showed significant increase in abscess size.  Status post sigmoid colectomy .  -Clear liquid diet for now, advance as tolerated  -Appreciate surgery assistance  -Continue meropenem given MICs, duration TBD  -Appreciate ID assistance: Intraoperative wound culture only growing Staph epidermidis  - Discharge pending antibiotic teaching and home order delivery, ideally .  Appreciate case management    # Postoperative ileus, resolved  - Continue regular diet    #Pseudomonas aeruginosa bacteremia: 1 of 4 samples collected on  is growing pansensitive Pseudomonas aeruginosa.  Surveillance cultures from 5/10 exhibiting no growth so far.  Suspect a true infection, GI source.  -Continue antibiotics as above  -Appreciate ID assistance    #Acute decompensation of chronic systolic heart failure: Reason for admission.  Euvolemic.  TTE  showed LVEF 20 to 25% with severe global hypokinesis.  Of note, at home, he does not use diuretics daily as he has had issues with hypotension-- he ends up using them only a few times a week at most.  -Appreciate cardiology assistance  -Hold off on further diuresis for now  -GDMT: Continue metoprolol (changed from carvedilol due to soft BPs).  Previously holding home losartan in setting of increased creatinine, now continuing to hold due to relative hypotension but could start sacubitril/valsartan when BPs are bit

## 2025-05-18 NOTE — CARE COORDINATION
Care Management Progress Note    Reason for Admission:   Shortness of breath [R06.02]  ALISHA (acute kidney injury) [N17.9]  Acute on chronic systolic CHF (congestive heart failure) (HCC) [I50.23]  Acute exacerbation of chronic heart failure (HCC) [I50.9]  Procedure(s) (LRB):  SIGMOID COLECTOMY (N/A)  4 Days Post-Op    Patient Admission Status: Inpatient  RUR: 12%  Hospitalization in the last 30 days (Readmission):  No      Spoke with [facility] liaison Edyta.  She states teaching for IV antibiotics has not been completed. [employer] [facility] can provide teaching tomorrow and will be able to prepare IV antibiotics in the compound pharmacy (unable to do today).  Patient already had PICC placed.     Transition of care plan:  Admitted for acute decompensated CHF, ALISHA.  Hx aortic stenosis with TAVR, BPH.  [DC plan]  Home with family  Wife to assist with IV antibiotics at home  Possible discharge tomorrow  Discharge plan communicated with patient and/or discharge caregiver: Yes    Date 1st IMM letter given: 5/12/25  Outpatient follow-up.  Transport at discharge:  family    Jane Bonilla, RN, MSN/Care manager

## 2025-05-18 NOTE — PLAN OF CARE
Problem: Chronic Conditions and Co-morbidities  Goal: Patient's chronic conditions and co-morbidity symptoms are monitored and maintained or improved  5/18/2025 0444 by Etelvina Lanza RN  Outcome: Progressing  5/17/2025 1736 by Alexandra Hopkins RN  Outcome: Progressing     Problem: Pain  Goal: Verbalizes/displays adequate comfort level or baseline comfort level  5/18/2025 0444 by Etelvina Lanza RN  Outcome: Progressing  5/17/2025 1736 by Alexandra Hopkins RN  Outcome: Progressing     Problem: Safety - Adult  Goal: Free from fall injury  5/18/2025 0444 by Etelvina Lanza RN  Outcome: Progressing  5/17/2025 1736 by Alexandra Hopkins RN  Outcome: Progressing

## 2025-05-19 ENCOUNTER — PATIENT MESSAGE (OUTPATIENT)
Age: 80
End: 2025-05-19

## 2025-05-19 VITALS
WEIGHT: 176.8 LBS | RESPIRATION RATE: 16 BRPM | DIASTOLIC BLOOD PRESSURE: 81 MMHG | OXYGEN SATURATION: 92 % | HEART RATE: 68 BPM | SYSTOLIC BLOOD PRESSURE: 113 MMHG | BODY MASS INDEX: 24.75 KG/M2 | TEMPERATURE: 98.4 F | HEIGHT: 71 IN

## 2025-05-19 LAB
ALBUMIN SERPL-MCNC: 2.3 G/DL (ref 3.5–5)
ALBUMIN/GLOB SERPL: 0.8 (ref 1.1–2.2)
ALP SERPL-CCNC: 67 U/L (ref 45–117)
ALT SERPL-CCNC: 32 U/L (ref 12–78)
ANION GAP SERPL CALC-SCNC: 5 MMOL/L (ref 2–12)
AST SERPL-CCNC: 26 U/L (ref 15–37)
BASOPHILS # BLD: 0.03 K/UL (ref 0–0.1)
BASOPHILS NFR BLD: 0.5 % (ref 0–1)
BILIRUB SERPL-MCNC: 0.8 MG/DL (ref 0.2–1)
BUN SERPL-MCNC: 23 MG/DL (ref 6–20)
BUN/CREAT SERPL: 24 (ref 12–20)
CALCIUM SERPL-MCNC: 8.3 MG/DL (ref 8.5–10.1)
CHLORIDE SERPL-SCNC: 108 MMOL/L (ref 97–108)
CO2 SERPL-SCNC: 28 MMOL/L (ref 21–32)
CREAT SERPL-MCNC: 0.95 MG/DL (ref 0.7–1.3)
DIFFERENTIAL METHOD BLD: ABNORMAL
EOSINOPHIL # BLD: 0.18 K/UL (ref 0–0.4)
EOSINOPHIL NFR BLD: 3.1 % (ref 0–7)
ERYTHROCYTE [DISTWIDTH] IN BLOOD BY AUTOMATED COUNT: 14.6 % (ref 11.5–14.5)
GLOBULIN SER CALC-MCNC: 3 G/DL (ref 2–4)
GLUCOSE SERPL-MCNC: 99 MG/DL (ref 65–100)
HCT VFR BLD AUTO: 34.3 % (ref 36.6–50.3)
HGB BLD-MCNC: 10.8 G/DL (ref 12.1–17)
IMM GRANULOCYTES # BLD AUTO: 0.04 K/UL (ref 0–0.04)
IMM GRANULOCYTES NFR BLD AUTO: 0.7 % (ref 0–0.5)
LYMPHOCYTES # BLD: 1.29 K/UL (ref 0.8–3.5)
LYMPHOCYTES NFR BLD: 22.4 % (ref 12–49)
MAGNESIUM SERPL-MCNC: 2.3 MG/DL (ref 1.6–2.4)
MCH RBC QN AUTO: 27.8 PG (ref 26–34)
MCHC RBC AUTO-ENTMCNC: 31.5 G/DL (ref 30–36.5)
MCV RBC AUTO: 88.4 FL (ref 80–99)
MONOCYTES # BLD: 0.48 K/UL (ref 0–1)
MONOCYTES NFR BLD: 8.3 % (ref 5–13)
NEUTS SEG # BLD: 3.73 K/UL (ref 1.8–8)
NEUTS SEG NFR BLD: 65 % (ref 32–75)
NRBC # BLD: 0 K/UL (ref 0–0.01)
NRBC BLD-RTO: 0 PER 100 WBC
PHOSPHATE SERPL-MCNC: 2.3 MG/DL (ref 2.6–4.7)
PLATELET # BLD AUTO: 195 K/UL (ref 150–400)
PMV BLD AUTO: 9.6 FL (ref 8.9–12.9)
POTASSIUM SERPL-SCNC: 3.7 MMOL/L (ref 3.5–5.1)
PROT SERPL-MCNC: 5.3 G/DL (ref 6.4–8.2)
RBC # BLD AUTO: 3.88 M/UL (ref 4.1–5.7)
SODIUM SERPL-SCNC: 141 MMOL/L (ref 136–145)
WBC # BLD AUTO: 5.8 K/UL (ref 4.1–11.1)

## 2025-05-19 PROCEDURE — 83735 ASSAY OF MAGNESIUM: CPT

## 2025-05-19 PROCEDURE — 36415 COLL VENOUS BLD VENIPUNCTURE: CPT

## 2025-05-19 PROCEDURE — 6360000002 HC RX W HCPCS: Performed by: FAMILY MEDICINE

## 2025-05-19 PROCEDURE — 99024 POSTOP FOLLOW-UP VISIT: CPT | Performed by: SURGERY

## 2025-05-19 PROCEDURE — 2500000003 HC RX 250 WO HCPCS: Performed by: HOSPITALIST

## 2025-05-19 PROCEDURE — 6370000000 HC RX 637 (ALT 250 FOR IP): Performed by: HOSPITALIST

## 2025-05-19 PROCEDURE — 2580000003 HC RX 258: Performed by: HOSPITALIST

## 2025-05-19 PROCEDURE — 6360000002 HC RX W HCPCS: Performed by: SURGERY

## 2025-05-19 PROCEDURE — 6360000002 HC RX W HCPCS: Performed by: HOSPITALIST

## 2025-05-19 PROCEDURE — 2500000003 HC RX 250 WO HCPCS: Performed by: FAMILY MEDICINE

## 2025-05-19 PROCEDURE — 80053 COMPREHEN METABOLIC PANEL: CPT

## 2025-05-19 PROCEDURE — 99232 SBSQ HOSP IP/OBS MODERATE 35: CPT | Performed by: INTERNAL MEDICINE

## 2025-05-19 PROCEDURE — 85025 COMPLETE CBC W/AUTO DIFF WBC: CPT

## 2025-05-19 PROCEDURE — 6370000000 HC RX 637 (ALT 250 FOR IP): Performed by: SURGERY

## 2025-05-19 PROCEDURE — 84100 ASSAY OF PHOSPHORUS: CPT

## 2025-05-19 RX ORDER — OXYCODONE HYDROCHLORIDE 5 MG/1
5 TABLET ORAL EVERY 4 HOURS PRN
Qty: 5 TABLET | Refills: 0 | Status: SHIPPED | OUTPATIENT
Start: 2025-05-19 | End: 2025-05-22

## 2025-05-19 RX ORDER — ALBUTEROL SULFATE 90 UG/1
2 INHALANT RESPIRATORY (INHALATION) EVERY 6 HOURS PRN
Qty: 18 G | Refills: 3 | Status: SHIPPED | OUTPATIENT
Start: 2025-05-19

## 2025-05-19 RX ORDER — METOPROLOL SUCCINATE 25 MG/1
12.5 TABLET, EXTENDED RELEASE ORAL DAILY
Qty: 90 TABLET | Refills: 0 | Status: SHIPPED | OUTPATIENT
Start: 2025-05-19

## 2025-05-19 RX ORDER — METOPROLOL TARTRATE 25 MG/1
12.5 TABLET, FILM COATED ORAL 2 TIMES DAILY
Qty: 60 TABLET | Refills: 3 | Status: SHIPPED | OUTPATIENT
Start: 2025-05-19 | End: 2025-05-19 | Stop reason: HOSPADM

## 2025-05-19 RX ORDER — ONDANSETRON 4 MG/1
4 TABLET, ORALLY DISINTEGRATING ORAL 3 TIMES DAILY PRN
Qty: 5 TABLET | Refills: 0 | Status: SHIPPED | OUTPATIENT
Start: 2025-05-19

## 2025-05-19 RX ADMIN — MEROPENEM 1000 MG: 1 INJECTION INTRAVENOUS at 00:25

## 2025-05-19 RX ADMIN — SODIUM CHLORIDE, PRESERVATIVE FREE 10 ML: 5 INJECTION INTRAVENOUS at 09:23

## 2025-05-19 RX ADMIN — ACETAMINOPHEN 650 MG: 325 TABLET ORAL at 00:23

## 2025-05-19 RX ADMIN — ACETAMINOPHEN 650 MG: 325 TABLET ORAL at 11:56

## 2025-05-19 RX ADMIN — ENOXAPARIN SODIUM 40 MG: 100 INJECTION SUBCUTANEOUS at 09:14

## 2025-05-19 RX ADMIN — ACETAMINOPHEN 650 MG: 325 TABLET ORAL at 05:48

## 2025-05-19 RX ADMIN — MEROPENEM 1000 MG: 1 INJECTION INTRAVENOUS at 09:16

## 2025-05-19 RX ADMIN — ALTEPLASE 1 MG: 2.2 INJECTION, POWDER, LYOPHILIZED, FOR SOLUTION INTRAVENOUS at 12:15

## 2025-05-19 RX ADMIN — METOPROLOL TARTRATE 12.5 MG: 25 TABLET, FILM COATED ORAL at 09:14

## 2025-05-19 ASSESSMENT — PAIN SCALES - GENERAL
PAINLEVEL_OUTOF10: 0
PAINLEVEL_OUTOF10: 6
PAINLEVEL_OUTOF10: 2
PAINLEVEL_OUTOF10: 4
PAINLEVEL_OUTOF10: 0

## 2025-05-19 ASSESSMENT — PAIN DESCRIPTION - DESCRIPTORS
DESCRIPTORS: OTHER (COMMENT)
DESCRIPTORS: ACHING
DESCRIPTORS: ACHING

## 2025-05-19 ASSESSMENT — PAIN DESCRIPTION - LOCATION
LOCATION: ABDOMEN

## 2025-05-19 ASSESSMENT — PAIN DESCRIPTION - ORIENTATION: ORIENTATION: LOWER

## 2025-05-19 NOTE — PROGRESS NOTES
Physical Therapy Note    PT tx deferred. Patient received having just returned to bed. Per family present in the room he has been ambulating around the unit with their assistance. He demonstrates soft pressures but is asymptomatic. Family has RW and BP cuff at home to continue monitoring.     Continue to recommend HH PT at D/C.

## 2025-05-19 NOTE — DISCHARGE SUMMARY
Stopping:         bumetanide (BUMEX) 0.5 MG tablet Comments:   Reason for Stopping:         losartan (COZAAR) 25 MG tablet Comments:   Reason for Stopping:         DICLOFENAC PO Comments:   Reason for Stopping:         acetaminophen (TYLENOL) 325 MG tablet Comments:   Reason for Stopping:             Activity: activity as tolerated  Diet: regular diet  Wound Care: as directed    Follow-up with Js Sanchez MD within one week unless specified earlier.     Approximate time spent in patient care on day of discharge: 36 min    Signed:  Alejandro Carolina MD  5/19/2025  11:48 AM

## 2025-05-19 NOTE — CARE COORDINATION
Care Management Progress Note    Reason for Admission:   Shortness of breath [R06.02]  ALISHA (acute kidney injury) [N17.9]  Acute on chronic systolic CHF (congestive heart failure) (HCC) [I50.23]  Acute exacerbation of chronic heart failure (HCC) [I50.9]  Procedure(s) (LRB):  SIGMOID COLECTOMY (N/A)  5 Days Post-Op    Patient Admission Status: Inpatient  RUR: 12% Low  Hospitalization in the last 30 days (Readmission):  No        Transition of care plan:  Patient medically stable for DC.   [facility]ee with [facility] completed bedside education with patient today for home IV antibiotic management.  [facility] accepted for SN and PT.   Discharge plan communicated with patient and/or discharge caregiver: Yes    Date 1st IMM letter given: 5/5/25  Outpatient follow-up: PCP, Specialist  Transport at discharge: Family Trinh Mulligan, MS  [phone]   Spoke with pt via phone.     Per Dr. Heron - Valtrex 1 gm TID for 7 days.      Follow up if not getting better.      Script has been sent to pharmacy.    No further questions.

## 2025-05-19 NOTE — PROGRESS NOTES
SURGERY PROGRESS NOTE      Admit Date: 2025    POD 5 Days Post-Op    Procedure: Procedure(s):  SIGMOID COLECTOMY      Subjective:   Feels well  Tolerating diet  +BM      Objective:     /69   Pulse 72   Temp 97.5 °F (36.4 °C) (Oral)   Resp 18   Ht 1.803 m (5' 11\")   Wt 80.2 kg (176 lb 12.8 oz)   SpO2 95%   BMI 24.66 kg/m²      Temp (24hrs), Av.6 °F (36.4 °C), Min:97.3 °F (36.3 °C), Max:97.9 °F (36.6 °C)      Physical Exam:     Abdomen:  Soft.  Non-tender, non-distended.  Incision C/D/I. JENNIFER serous        Assessment:     Principal Problem:    Acute on chronic systolic CHF (congestive heart failure) (HCC)  Active Problems:    Acute exacerbation of chronic heart failure (HCC)    Bacteremia due to Pseudomonas    Diverticulitis of large intestine with abscess without bleeding    H/O allergy to antibiotic agent    QT prolongation    AICD (automatic cardioverter/defibrillator) present    Palliative care encounter    Goals of care, counseling/discussion    Bacteremia    Perforated diverticulum of large intestine    Intra-abdominal infection  Resolved Problems:    * No resolved hospital problems. *      Plan/Recommendations/Medical Decision Making:     Ok for d/c home  Ok to remove drain prior to d/c   Follow up 2 weeks

## 2025-05-19 NOTE — PROGRESS NOTES
Notify via secure message to Rick Hernández for surgery team. Emptied 80 ml of serous drainage from JENNIFER. Notify per wife surgeon had been by and states will remove JENNIFER. Per Betty KIM, keep JENNIFER and patient okay to be discharge home, no needed to record daily output and will be remove in 2 weeks in clinic on their follow  up appointment. No further orders received.

## 2025-05-19 NOTE — DISCHARGE INSTRUCTIONS
HOSPITALIST DISCHARGE INSTRUCTIONS  NAME:  Aakash Faust   :  1945   MRN:  [id number]     Date/Time:  2025 10:23 AM    ADMIT DATE: 2025     DISCHARGE DATE: 2025     DISCHARGE DIAGNOSIS:  Diverticulitis    DISCHARGE INSTRUCTIONS:  Thank you for allowing us to participate in your care. Your discharging Hospitalist is Alejandro Carolina MD. You were admitted for evaluation and treatment of the above.     --Please give a copy of this document to your primary care provider.  Please follow up with them within one week.       MEDICATIONS:    It is important that you take the medication exactly as they are prescribed.   Keep your medication in the bottles provided by the pharmacist and keep a list of the medication names, dosages, and times to be taken in your wallet.   Do not take other medications without consulting your doctor.             If you experience any of the following symptoms then please call your primary care physician or return to the emergency room if you cannot get hold of your doctor:  Fever, chills, nausea, vomiting, diarrhea, change in mentation, falling, bleeding, shortness of breath    Follow Up:  Please call the below provider to arrange hospital follow up appointment      [address]  1[address] Floor  [facility] [address]  [phone]  Follow up  Home Health SN and PT    [facility]  [phone]  Follow up  IV Antibiotics    Js Sanchez MD  [email or web address]   [facility] [address] [phone]    Schedule an appointment as soon as possible for a visit      Tayler Lainez MD  [address]  [facility] [address] [phone]    Schedule an appointment as soon as possible for a visit          Information obtained by :  I understand that if any problems occur once I am at home I am to contact my physician.    I understand and acknowledge receipt of the instructions indicated above.  5/6.  Repeat CT on 5/13 showed significant increase in abscess size.  Status post sigmoid colectomy 5/14.  -Appreciate surgery assistance  -Continue meropenem   -Appreciate ID assistance: Intraoperative wound culture only growing scant Staph epidermidis, and ID believes no need to change add additional abx to meropenem.   - Discharge with antibiotic teaching and home order delivery.       # Postoperative ileus, resolved  - Continue regular diet     #Pseudomonas aeruginosa bacteremia: 1 of 4 samples collected on 5/6 is growing pansensitive Pseudomonas aeruginosa.  Surveillance cultures from 5/10 exhibiting no growth so far.  Suspect a true infection, GI source.  -Continue antibiotics as above    Post Discharge PICC and Antibiotic Orders     1.  Diagnosis: Diverticular abscess.  Pseudomonal bacteremia  2.  Antibiotic: Meropenem 1 g IV every 8 hours through 5/26/2025   3.  Routine PICC/ Sharad/ Portacath Care including PRN catheter flow management  4.  Weekly labs:              [x] CBC/diff/platelets              [x] BUN/Creatinine              [] CPK              [x] AST/TotalBilirubin/AlkalinePhosphatase              [x] CRP              []Trough Vancomycin level goal 15-20  5.  Fax lab to [phone]  Call Critical Lab Values to [phone]  6.  May send to IR for line evaluation or replacement [phone]  7.  Home Health to pull PIC line at end of therapy or send to IR for Sharad removal     #Acute decompensation of chronic systolic heart failure: Reason for admission.  Euvolemic.  TTE 4/26 showed LVEF 20 to 25% with severe global hypokinesis.  Of note, at home, he does not use diuretics daily as he has had issues with hypotension-- he ends up using them only a few times a week at most.  -Hold off on further diuresis for now  -GDMT: Continue metoprolol (changed from carvedilol due to soft BPs). Not tolerated other GDMT currently.   -follow up with cardiology      #ALISHA: Resolved.  Secondary to

## 2025-05-19 NOTE — PROGRESS NOTES
Notify MD Carolina via secure message patient PICC line not having blood return. Acknowledge information. MD Carolina states will order cathflo. No further orders received.

## 2025-05-19 NOTE — PROGRESS NOTES
[facility] Progress Note    5/19/2025      Assessment & Plan:     Pseudomonal bacteremia.  Suspect due to diverticulitis.  Repeat blood cultures NGSF. Pseudomonas with elevated TOMÁS's to cefepime and piperacillin-tazobactam.  Continue meropenem.   Diverticulitis with with 2.2 x 1.0 cm right pelvic abscess.  Repeat CT showed significant increase in size of abscess despite appropriate antibiotics.  Patient was taken to the OR on 5/14 and underwent sigmoid colectomy for perforated diverticulitis.  Culture growing oxacillin sensitive coagulase-negative Staphylococcus.  Plan discharge on meropenem as outlined below    ALISHA.  Resolved  TAVR/AICD.  History of aortic stenosis.  Follow blood cultures  Dementia  History of ciprofloxacin and sulfa allergies  Prolonged QTc.  QTc 5/6 was 515    Post Discharge PICC and Antibiotic Orders    1.  Diagnosis: Diverticular abscess.  Pseudomonal bacteremia  2.  Antibiotic: Meropenem 1 g IV every 8 hours through 5/26/2025   3.  Routine PICC/ Sharad/ Portacath Care including PRN catheter flow management  4.  Weekly labs:   [x] CBC/diff/platelets   [x] BUN/Creatinine   [] CPK   [x] AST/TotalBilirubin/AlkalinePhosphatase   [x] CRP   []Trough Vancomycin level goal 15-20  5.  Fax lab to [phone]  Call Critical Lab Values to [phone]  6.  May send to IR for line evaluation or replacement [phone]  7.  Home Health to pull PIC line at end of therapy or send to IR for Sharad removal  8.  Allergies:    Allergies   Allergen Reactions    Hydromorphone      Hypertension    Statins Myalgia     AND MEMORY LOSS     Ciprofloxacin Other (See Comments) and Rash     fever    Sulfa Antibiotics Other (See Comments) and Rash     Fever   Please contact Dr. Dallas with any questions or concerns in regards outpatient antibiotic therapy.            Subjective:     No complaints    Objective:     Vitals: /69   Pulse 72   Temp 97.5 °F (36.4 °C) (Oral)

## 2025-05-19 NOTE — PROGRESS NOTES
Discharge instructions provide to patient wife. Education provide regarding JENNIFER drain care and discharge instructions given to patient wife. Patient wife verbalizes understanding.

## 2025-05-24 LAB
ALP SERPL-CCNC: 94 IU/L (ref 44–121)
AST SERPL-CCNC: 24 IU/L (ref 0–40)
BASOPHILS # BLD AUTO: 0 X10E3/UL (ref 0–0.2)
BASOPHILS NFR BLD AUTO: 1 %
BILIRUB SERPL-MCNC: 0.5 MG/DL (ref 0–1.2)
BUN SERPL-MCNC: 16 MG/DL (ref 8–27)
CK SERPL-CCNC: 49 U/L (ref 41–331)
CREAT SERPL-MCNC: 1.04 MG/DL (ref 0.76–1.27)
EGFRCR SERPLBLD CKD-EPI 2021: 73 ML/MIN/1.73
EOSINOPHIL # BLD AUTO: 0.1 X10E3/UL (ref 0–0.4)
EOSINOPHIL NFR BLD AUTO: 2 %
ERYTHROCYTE [DISTWIDTH] IN BLOOD BY AUTOMATED COUNT: 14.1 % (ref 11.6–15.4)
HCT VFR BLD AUTO: 35.1 % (ref 37.5–51)
HGB BLD-MCNC: 11.2 G/DL (ref 13–17.7)
IMM GRANULOCYTES # BLD AUTO: 0 X10E3/UL (ref 0–0.1)
IMM GRANULOCYTES NFR BLD AUTO: 0 %
LYMPHOCYTES # BLD AUTO: 0.8 X10E3/UL (ref 0.7–3.1)
LYMPHOCYTES NFR BLD AUTO: 14 %
MCH RBC QN AUTO: 28.5 PG (ref 26.6–33)
MCHC RBC AUTO-ENTMCNC: 31.9 G/DL (ref 31.5–35.7)
MCV RBC AUTO: 89 FL (ref 79–97)
MONOCYTES # BLD AUTO: 0.5 X10E3/UL (ref 0.1–0.9)
MONOCYTES NFR BLD AUTO: 8 %
NEUTROPHILS # BLD AUTO: 4.6 X10E3/UL (ref 1.4–7)
NEUTROPHILS NFR BLD AUTO: 75 %
PLATELET # BLD AUTO: 175 X10E3/UL (ref 150–450)
RBC # BLD AUTO: 3.93 X10E6/UL (ref 4.14–5.8)
WBC # BLD AUTO: 6.1 X10E3/UL (ref 3.4–10.8)

## 2025-05-27 ENCOUNTER — OFFICE VISIT (OUTPATIENT)
Age: 80
End: 2025-05-27

## 2025-05-27 VITALS
BODY MASS INDEX: 26.1 KG/M2 | RESPIRATION RATE: 16 BRPM | SYSTOLIC BLOOD PRESSURE: 100 MMHG | TEMPERATURE: 98.3 F | OXYGEN SATURATION: 97 % | DIASTOLIC BLOOD PRESSURE: 66 MMHG | HEIGHT: 71 IN | WEIGHT: 186.4 LBS | HEART RATE: 81 BPM

## 2025-05-27 DIAGNOSIS — K57.20 PERFORATED DIVERTICULUM OF LARGE INTESTINE: Primary | ICD-10-CM

## 2025-05-27 PROCEDURE — 99024 POSTOP FOLLOW-UP VISIT: CPT | Performed by: SURGERY

## 2025-05-27 NOTE — PROGRESS NOTES
Identified pt with two pt identifiers (name and ). Reviewed chart in preparation for visit and have obtained necessary documentation.    Aakash Faust is a 79 y.o. male  Chief Complaint   Patient presents with    Post-Op Check     25 Sigmoid colectomy   Possible kaushal drain removal     /66 (BP Site: Left Upper Arm, Patient Position: Sitting, BP Cuff Size: Large Adult)   Pulse 81   Temp 98.3 °F (36.8 °C) (Oral)   Resp 16   Ht 1.803 m (5' 11\")   Wt 84.6 kg (186 lb 6.4 oz)   SpO2 97%   BMI 26.00 kg/m²     1. Have you been to the ER, urgent care clinic since your last visit?  Hospitalized since your last visit?no    2. Have you seen or consulted any other health care providers outside of the [facility] since your last visit?  Include any pap smears or colon screening. no

## 2025-05-27 NOTE — PROGRESS NOTES
Surgery Progress Note    5/27/2025    had concerns including Post-Op Check (5/14/25 Sigmoid colectomy /Possible kaushal drain removal).     Subjective:     Patient is a 79 y.o. male seen in the office today after sigmoid colectomy for perforated diverticulitis.  He reports that overall he is doing very well.  He is eating and drinking well and having regular bowel movements.  Does complain of ongoing lower extremity edema.  He has had decreased output from his KAUSHAL drain.      Past Medical History:   Diagnosis Date    Coronary atherosclerosis of unspecified type of vessel, native or graft 11/19/2010    Dementia (HCC)     MORE SHORT TERM    History of kidney stones     Mixed hyperlipidemia     Neuropathy of lower extremity     RT LEG AND SOMETIMES AFFECTS GAIT    Prostate cancer (HCC) 2016        Past Surgical History:   Procedure Laterality Date    CARDIAC CATHETERIZATION      CARDIAC PROCEDURE N/A 01/08/2024    Left and right heart cath / coronary angiography performed by Neymar Jerome MD at [facility] CARDIAC CATH LAB    CARDIAC PROCEDURE N/A 6/13/2024    . performed by Neymar Jerome MD at [facility] OPEN HEART    CARDIAC SURGERY N/A 6/13/2024    TRANSCATHETER AORTIC VALVE REPLACEMENT RIGHT TRANSFEMORAL APPROACH  26 S 3 performed by Raghav Dukes MD at [facility] OPEN HEART    CARPAL TUNNEL RELEASE  10/2019    bilateral    COLONOSCOPY      CORONARY ARTERY BYPASS GRAFT      EP DEVICE PROCEDURE N/A 03/08/2024    Insert ICD multi performed by Ernst Parmar MD at [facility] CARDIAC CATH LAB    OTHER SURGICAL HISTORY      implanted pain management device    SIGMOID COLECTOMY N/A 5/14/2025    SIGMOID COLECTOMY performed by Tayler Lainez MD at [facility] MAIN OR    SPINAL CORD STIMULATOR IMPLANT  2024    PAIN STIMULATOR IMPLANT - DOES NOT CURRENTLY WORK- PER WIFE NOT TURNED ON    TOTAL KNEE ARTHROPLASTY Left     TOTAL KNEE ARTHROPLASTY Right           Objective:     Vitals:    05/27/25 1520   BP: 100/66   Pulse: 81   Resp: 16   Temp: 98.3 °F

## 2025-06-01 ENCOUNTER — PATIENT MESSAGE (OUTPATIENT)
Age: 80
End: 2025-06-01

## 2025-06-02 NOTE — TELEPHONE ENCOUNTER
Longest noted VT episode 5 seconds.  I don't see that there's been recurrence since hospital discharge.  Continue low dose Toprol XL.  If BP allows, that could be increased in the future if there's more VT activity.    He does have CAD, has a known occluded graft.  Last C was in 01/2024 prior to TAVR.  He's following up with Nidia later this month, could maybe consider a stress test.  I don't think there's a need to move up appts.      Future Appointments   Date Time Provider Department Center   6/19/2025 10:00 AM Nidia Jones APRN - CNP [facility] BS AMB   6/26/2025  2:40 PM [email or web address], [facility] BS AMB   6/26/2025  3:00 PM Ernst Parmar MD [facility] BS AMB   7/3/2025  1:00 PM Alejandro Hernández PA BS BS AMB

## 2025-06-03 PROBLEM — M17.11 OSTEOARTHRITIS OF RIGHT KNEE: Status: ACTIVE | Noted: 2025-06-03

## 2025-06-10 ENCOUNTER — TELEPHONE (OUTPATIENT)
Age: 80
End: 2025-06-10

## 2025-06-10 RX ORDER — ATENOLOL 25 MG/1
25 TABLET ORAL DAILY
COMMUNITY

## 2025-06-10 NOTE — TELEPHONE ENCOUNTER
Returned wife Kirstin's call-    States pt saw his PCP yesterday and she did have a hard time getting him there.   States they did walk him around and felt he did not currently need oxygen.    They gave him IV Rocephin and some fluid. She said they thought he was starting a pulmonary infection.    They do have an Albuterol inhaler to use q 6 hours if needed.    Tried him on Toprol XL and states he has side effects and changed him to Atenolol this past weekend 25 mg once daily.    Wife states at some point they may have to look in to hospice.    Reminded if pt sats drop below 92 consistently with SOB or worsens in any other way to call 911.    Wife verbalized understanding and reminded to call if any concerns.

## 2025-06-18 ENCOUNTER — TELEPHONE (OUTPATIENT)
Age: 80
End: 2025-06-18

## 2025-06-18 NOTE — TELEPHONE ENCOUNTER
Patient's wife called to report her  will not be able to make his one year post TAVR appointment. The patient is now on hospice and per the patient's wife, she has been told insurance will not cover the appointment.     Trinh Singh  Structural Heart RN Coordinator

## 2025-07-01 ENCOUNTER — TELEPHONE (OUTPATIENT)
Age: 80
End: 2025-07-01

## 2025-07-01 NOTE — TELEPHONE ENCOUNTER
Patient's spouse called in stating she needed to cancel the appt with our office on 07/03/2025 at 1pm with Alejandro KIM due to her  passing away on Saturday 06/28/2025. Cancelled patient's appt and updated patient demographics.

## (undated) DEVICE — WRAP SURG W1.31XL1.34M CARD FOR PT 165-172CM THERMOWRP

## (undated) DEVICE — SUTURE PERMAHAND SZ 2-0 L18IN NONABSORBABLE BLK L26MM SH C012D

## (undated) DEVICE — SUTURE ETHBND EXCEL SZ 2 L30IN NONABSORBABLE GRN L40MM V-37 MX69G

## (undated) DEVICE — SPECIAL PROCEDURE DRAPE 32" X 34": Brand: SPECIAL PROCEDURE DRAPE

## (undated) DEVICE — INTRODUCER SHTH L13CM OD7FR SH ORNG HUB SEAMLESS SAFSHTH

## (undated) DEVICE — CATHETER ART THERMODILUTION 6 FRX110 CM 4 LUMEN SWAN

## (undated) DEVICE — SUTURE V-LOC 180 SZ 2-0 L9IN ABSRB VLT GS-21 L37MM 1/2 CIR VLOCM0345

## (undated) DEVICE — 3M™ TEGADERM™ TRANSPARENT FILM DRESSING FRAME STYLE, 1626W, 4 IN X 4-3/4 IN (10 CM X 12 CM), 50/CT 4CT/CASE: Brand: 3M™ TEGADERM™

## (undated) DEVICE — KIT MFLD ISOLATN NACL CNTRST PRT TBNG SPIK W/ PRSS TRNSDUC

## (undated) DEVICE — AMPLATZ 0.35X260CM SUPER STIFF STRAIGHT TIP

## (undated) DEVICE — RADIFOCUS GLIDECATH: Brand: GLIDECATH

## (undated) DEVICE — CATHETER PRESSURE WEDGE BLLN 6FRX110CM

## (undated) DEVICE — ELECTRODE PT RET AD L9FT HI MOIST COND ADH HYDRGEL CORDED

## (undated) DEVICE — BLADE ES L6IN ELASTOMERIC COAT EXT DURABLE BEND UPTO 90DEG

## (undated) DEVICE — SEALER ENDOSCP NANO COAT OPN DIV CRV L JAW LIGASURE IMPACT

## (undated) DEVICE — 3M™ IOBAN™ 2 ANTIMICROBIAL INCISE DRAPE 6650EZ: Brand: IOBAN™ 2

## (undated) DEVICE — Device

## (undated) DEVICE — PRESSURE MONITORING SET: Brand: TRUWAVE

## (undated) DEVICE — KIT AT-X65 ANGIOTOUCH HAND CONTROLLER

## (undated) DEVICE — STAPLER INT DIA29MM CLS STPL H1.5-2.2MM OPN LEG L5.2MM 26

## (undated) DEVICE — BLANKET WRM W35.4XL86.6IN FULL UNDERBODY + FORC AIR

## (undated) DEVICE — WASTE KIT - ST MARY: Brand: MEDLINE INDUSTRIES, INC.

## (undated) DEVICE — PROVE COVER: Brand: UNBRANDED

## (undated) DEVICE — TR BAND RADIAL ARTERY COMPRESSION DEVICE: Brand: TR BAND

## (undated) DEVICE — LARGE, DISPOSABLE ALEXIS O C-SECTION PROTECTOR - RETRACTOR: Brand: ALEXIS ® O C-SECTION PROTECTOR - RETRACTOR

## (undated) DEVICE — SUTURE VICRYL + SZ 4-0 L27IN ABSRB UD PS-2 3/8 CIR REV CUT VCP426H

## (undated) DEVICE — YANKAUER,BULB TIP,W/O VENT,RIGID,STERILE: Brand: MEDLINE

## (undated) DEVICE — COLON CLOSING PACK: Brand: MEDLINE INDUSTRIES, INC.

## (undated) DEVICE — CATHETER ANGIO JR4 AD 5 FRX100 CM 25 CM PERFORMA

## (undated) DEVICE — PADPRO DEFIBRILLATION/PACING/CARDIOVERSION/MONITORING ELECTRODES, ADULT/CHILD GREATER THAN 10 KG RADIOTRANSPARENT ELECTRODE, PHYSIO-CONTROL QUIK-COMBO (M) 60" (152 CM): Brand: PADPRO

## (undated) DEVICE — GUIDEWIRE VASC L150CM DIA0.035IN FLX TIP L7CM PTFE STR FIX

## (undated) DEVICE — SUTURE PROL SZ 2-0 L36IN NONABSORBABLE BLU SH L26MM 1/2 CIR 8523H

## (undated) DEVICE — DRAPE,REIN 53X77,STERILE: Brand: MEDLINE

## (undated) DEVICE — SUTURE DEV SZ 3-0 V-LOC 90 L12IN TO L18IN CV-23 VLT VLOCM0844

## (undated) DEVICE — PINNACLE INTRODUCER SHEATH: Brand: PINNACLE

## (undated) DEVICE — 260 CM J TIP WIRE .035

## (undated) DEVICE — SOLUTION IRRIG 1000ML 09% SOD CHL USP PIC PLAS CONTAINER

## (undated) DEVICE — TOWEL,OR,DSP,ST,BLUE,STD,4/PK,20PK/CS: Brand: MEDLINE

## (undated) DEVICE — GYN LAPAROSCOPY-SFMC: Brand: MEDLINE INDUSTRIES, INC.

## (undated) DEVICE — LIQUIBAND RAPID ADHESIVE 36/CS 0.8ML: Brand: MEDLINE

## (undated) DEVICE — GLIDESHEATH SLENDER STAINLESS STEEL KIT: Brand: GLIDESHEATH SLENDER

## (undated) DEVICE — PERCLOSE PROGLIDE™ SUTURE-MEDIATED CLOSURE SYSTEM: Brand: PERCLOSE PROGLIDE™

## (undated) DEVICE — STERILE-Z MAYO STAND COVERS CLEAR POLYETHYLENE STERILE UNIVERSAL FIT 20 PER CASE: Brand: STERILE-Z

## (undated) DEVICE — AGENT HEMSTAT 3GM PURIFIED PLNT STARCH PWD ABSRB ARISTA AH

## (undated) DEVICE — CO-SET DELIVERY SYSTEM FOR 123 ROOM TEMPATURE INJECTATE: Brand: CO-SET+

## (undated) DEVICE — KIT MED IMAG CNTRST AGNT W/ IOPAMIDOL REUSE

## (undated) DEVICE — TRANSFER SET 3": Brand: MEDLINE INDUSTRIES, INC.

## (undated) DEVICE — KIT HND CTRL 3 W STPCOCK ROT END 54IN PREM HI PRSS TBNG AT

## (undated) DEVICE — COVER,LIGHT,CAMERA,HARD,1/PK,STRL: Brand: MEDLINE

## (undated) DEVICE — DRESSING POSTOP AG PRISMASEAL 3.5X6IN

## (undated) DEVICE — CATHETER PACE 5FR L110CM 6FR INTRO D10CM 1MM SPACE POLYUR

## (undated) DEVICE — TUBING, SUCTION, 1/4" X 12', STRAIGHT: Brand: MEDLINE

## (undated) DEVICE — SUTURE ABSORBABLE L 18 IN SZ 4-0 NDL L 19 MM POLYGLACTIN 910 36/BX

## (undated) DEVICE — ELECTRODE,RADIOTRANSLUCENT,FOAM,5PK: Brand: MEDLINE

## (undated) DEVICE — COVER LT HNDL PLAS RIG 1 PER PK

## (undated) DEVICE — CATHETER DUAL LUMEN LANGSTON 6F L110CM GWIRE 0.038IN 1000PSI

## (undated) DEVICE — PACK PROCEDURE SURG HRT CATH

## (undated) DEVICE — YANKAUER,POOLE TIP,STERILE,50/CS: Brand: MEDLINE

## (undated) DEVICE — 5FR MEDTRONIC PIG  110CM

## (undated) DEVICE — RELOAD STPLR L 80 MM CLOSED STPL H 1.5 MM STD 4 ROW LINEAR

## (undated) DEVICE — SYRINGE MED 50ML LUERLOCK TIP

## (undated) DEVICE — PACEMAKER PACK: Brand: MEDLINE INDUSTRIES, INC.

## (undated) DEVICE — GLOVE SURG SZ 8 CRM LTX FREE POLYISOPRENE POLYMER BEAD ANTI

## (undated) DEVICE — DRAIN ROUND 15FR PERFORATED SURG L49IN DIA3/16IN 10IN H SIL W/O TRCR END PERF

## (undated) DEVICE — SPONGE,DRAIN,NONWVN,4"X4",6PLY,STRL,LF: Brand: MEDLINE

## (undated) DEVICE — GLOVE SURG SZ 65 THK91MIL LTX FREE SYN POLYISOPRENE

## (undated) DEVICE — SWAB CULT DBL W/O CHAR RAYON TIP AMIES GEL CLMN FOR COLL

## (undated) DEVICE — OPEN HEART A- RICHMOND: Brand: MEDLINE INDUSTRIES, INC.

## (undated) DEVICE — 6 FOOT DISPOSABLE EXTENSION CABLE WITH SAFE CONNECT / SCREW-DOWN

## (undated) DEVICE — GOWN,SIRUS,FABRNF,XL,20/CS: Brand: MEDLINE

## (undated) DEVICE — 1000ML,PRESSURE INFUSER W/STOPCOCK: Brand: MEDLINE

## (undated) DEVICE — RESERVOIR,SUCTION,100CC,SILICONE: Brand: MEDLINE

## (undated) DEVICE — C-ARM: Brand: UNBRANDED

## (undated) DEVICE — SUTURE VICRYL + SZ 3-0 L27IN ABSRB UD L26MM SH 1/2 CIR VCP416H

## (undated) DEVICE — CATHETER DIAG L100CM OD5FR ID35MM VASC JUDKINS L PERIPH W O

## (undated) DEVICE — CATHETER 5FR AL1 MEDTRONIC 100CM

## (undated) DEVICE — CATHETER ANGIO L100CM DIA5FR MP A CRV COR INSLIDE ADD DBL

## (undated) DEVICE — KIT INTRO 9FR L13CM DIA0.118IN SPLITTABLE HEMSTAT ROBUST

## (undated) DEVICE — DRAPE FLD WRM W44XL66IN C6L FOR INTRATEMP SYS THERMABASIN

## (undated) DEVICE — CATHETER EP 6FR L92CM 2-5-2MM SPC TIP 1MM 4 ELECTRD D CRV

## (undated) DEVICE — CANISTER, RIGID, 3000CC: Brand: MEDLINE INDUSTRIES, INC.

## (undated) DEVICE — SOLUTION IRRIG 1000ML H2O PIC PLAS SHATTERPROOF CONTAINER

## (undated) DEVICE — SYRINGE MED 30ML STD CLR PLAS LUERLOCK TIP N CTRL DISP

## (undated) DEVICE — RADIFOCUS GLIDEWIRE: Brand: GLIDEWIRE

## (undated) DEVICE — STIFFEN MICRO-INTRODUCER KIT: Brand: STIFFEN MICRO-INTRODUCER KIT

## (undated) DEVICE — STAPLER CUT L 80 MM CLOSED STPL H 1.5/2.2 MM 4 ROW LINEAR

## (undated) DEVICE — SUTURE PERMAHAND SZ 3-0 L18IN NONABSORBABLE BLK L26MM SH C013D

## (undated) DEVICE — ANGIOGRAPHY KIT

## (undated) DEVICE — SUTURE PDS II SZ 1 L36IN ABSRB VLT L48MM CTX 1/2 CIR Z371T

## (undated) DEVICE — APPLICATOR MEDICATED 26 CC SOLUTION HI LT ORNG CHLORAPREP

## (undated) DEVICE — SYSTEM INTRO 9.5FR L13CM STD WHT CAP HEMSTAT SPLITTABLE